# Patient Record
Sex: FEMALE | Race: WHITE | Employment: UNEMPLOYED | URBAN - NONMETROPOLITAN AREA
[De-identification: names, ages, dates, MRNs, and addresses within clinical notes are randomized per-mention and may not be internally consistent; named-entity substitution may affect disease eponyms.]

---

## 2017-08-22 ENCOUNTER — TRANSCRIBE ORDERS (OUTPATIENT)
Dept: LAB | Facility: MEDICAL CENTER | Age: 47
End: 2017-08-22

## 2017-08-22 ENCOUNTER — APPOINTMENT (OUTPATIENT)
Dept: LAB | Facility: MEDICAL CENTER | Age: 47
End: 2017-08-22
Payer: COMMERCIAL

## 2017-08-22 DIAGNOSIS — E16.1 HYPERINSULINEMIA: Primary | ICD-10-CM

## 2017-08-22 DIAGNOSIS — E16.1 HYPERINSULINEMIA: ICD-10-CM

## 2017-08-22 LAB — INSULIN SERPL-ACNC: 4.8 MU/L (ref 3–25)

## 2017-08-22 PROCEDURE — 83525 ASSAY OF INSULIN: CPT

## 2017-08-22 PROCEDURE — 36415 COLL VENOUS BLD VENIPUNCTURE: CPT

## 2018-03-29 ENCOUNTER — TRANSCRIBE ORDERS (OUTPATIENT)
Dept: LAB | Facility: MEDICAL CENTER | Age: 48
End: 2018-03-29

## 2018-03-29 ENCOUNTER — APPOINTMENT (OUTPATIENT)
Dept: LAB | Facility: MEDICAL CENTER | Age: 48
End: 2018-03-29
Payer: COMMERCIAL

## 2018-03-29 DIAGNOSIS — B34.9 VIRAL SYNDROME: Primary | ICD-10-CM

## 2018-03-29 DIAGNOSIS — R53.83 FATIGUE, UNSPECIFIED TYPE: ICD-10-CM

## 2018-03-29 DIAGNOSIS — F11.90 OPIATE USE: ICD-10-CM

## 2018-03-29 DIAGNOSIS — B34.9 VIRAL SYNDROME: ICD-10-CM

## 2018-03-29 DIAGNOSIS — Z79.891 ENCOUNTER FOR LONG-TERM METHADONE USE: ICD-10-CM

## 2018-03-29 LAB
25(OH)D3 SERPL-MCNC: 80.6 NG/ML (ref 30–100)
ALBUMIN SERPL BCP-MCNC: 4.1 G/DL (ref 3.5–5)
ALP SERPL-CCNC: 83 U/L (ref 46–116)
ALT SERPL W P-5'-P-CCNC: 15 U/L (ref 12–78)
ANION GAP SERPL CALCULATED.3IONS-SCNC: 7 MMOL/L (ref 4–13)
AST SERPL W P-5'-P-CCNC: 12 U/L (ref 5–45)
BASOPHILS # BLD AUTO: 0.03 THOUSANDS/ΜL (ref 0–0.1)
BASOPHILS NFR BLD AUTO: 0 % (ref 0–1)
BILIRUB SERPL-MCNC: 0.46 MG/DL (ref 0.2–1)
BUN SERPL-MCNC: 12 MG/DL (ref 5–25)
CALCIUM SERPL-MCNC: 9.4 MG/DL (ref 8.3–10.1)
CHLORIDE SERPL-SCNC: 109 MMOL/L (ref 100–108)
CO2 SERPL-SCNC: 21 MMOL/L (ref 21–32)
CREAT SERPL-MCNC: 1.1 MG/DL (ref 0.6–1.3)
EOSINOPHIL # BLD AUTO: 0.12 THOUSAND/ΜL (ref 0–0.61)
EOSINOPHIL NFR BLD AUTO: 1 % (ref 0–6)
ERYTHROCYTE [DISTWIDTH] IN BLOOD BY AUTOMATED COUNT: 13 % (ref 11.6–15.1)
GFR SERPL CREATININE-BSD FRML MDRD: 60 ML/MIN/1.73SQ M
GLUCOSE P FAST SERPL-MCNC: 71 MG/DL (ref 65–99)
HCT VFR BLD AUTO: 37.6 % (ref 34.8–46.1)
HGB BLD-MCNC: 13.1 G/DL (ref 11.5–15.4)
LYMPHOCYTES # BLD AUTO: 3.68 THOUSANDS/ΜL (ref 0.6–4.47)
LYMPHOCYTES NFR BLD AUTO: 33 % (ref 14–44)
MCH RBC QN AUTO: 30.3 PG (ref 26.8–34.3)
MCHC RBC AUTO-ENTMCNC: 34.8 G/DL (ref 31.4–37.4)
MCV RBC AUTO: 87 FL (ref 82–98)
MONOCYTES # BLD AUTO: 0.57 THOUSAND/ΜL (ref 0.17–1.22)
MONOCYTES NFR BLD AUTO: 5 % (ref 4–12)
NEUTROPHILS # BLD AUTO: 6.67 THOUSANDS/ΜL (ref 1.85–7.62)
NEUTS SEG NFR BLD AUTO: 61 % (ref 43–75)
NRBC BLD AUTO-RTO: 0 /100 WBCS
PLATELET # BLD AUTO: 327 THOUSANDS/UL (ref 149–390)
PMV BLD AUTO: 10.1 FL (ref 8.9–12.7)
POTASSIUM SERPL-SCNC: 3.5 MMOL/L (ref 3.5–5.3)
PROT SERPL-MCNC: 7.9 G/DL (ref 6.4–8.2)
RBC # BLD AUTO: 4.33 MILLION/UL (ref 3.81–5.12)
SODIUM SERPL-SCNC: 137 MMOL/L (ref 136–145)
TSH SERPL DL<=0.05 MIU/L-ACNC: 1.66 UIU/ML (ref 0.36–3.74)
WBC # BLD AUTO: 11.09 THOUSAND/UL (ref 4.31–10.16)

## 2018-03-29 PROCEDURE — 80053 COMPREHEN METABOLIC PANEL: CPT

## 2018-03-29 PROCEDURE — 82306 VITAMIN D 25 HYDROXY: CPT

## 2018-03-29 PROCEDURE — 84443 ASSAY THYROID STIM HORMONE: CPT

## 2018-03-29 PROCEDURE — 36415 COLL VENOUS BLD VENIPUNCTURE: CPT

## 2018-03-29 PROCEDURE — 86644 CMV ANTIBODY: CPT

## 2018-03-29 PROCEDURE — 86738 MYCOPLASMA ANTIBODY: CPT

## 2018-03-29 PROCEDURE — 80307 DRUG TEST PRSMV CHEM ANLYZR: CPT | Performed by: FAMILY MEDICINE

## 2018-03-29 PROCEDURE — 80361 OPIATES 1 OR MORE: CPT | Performed by: FAMILY MEDICINE

## 2018-03-29 PROCEDURE — 86645 CMV ANTIBODY IGM: CPT

## 2018-03-29 PROCEDURE — 86665 EPSTEIN-BARR CAPSID VCA: CPT

## 2018-03-29 PROCEDURE — 86664 EPSTEIN-BARR NUCLEAR ANTIGEN: CPT

## 2018-03-29 PROCEDURE — 86663 EPSTEIN-BARR ANTIBODY: CPT

## 2018-03-29 PROCEDURE — 85025 COMPLETE CBC W/AUTO DIFF WBC: CPT

## 2018-03-30 LAB
CMV IGG SERPL IA-ACNC: <0.6 U/ML (ref 0–0.59)
CMV IGM SERPL IA-ACNC: <30 AU/ML (ref 0–29.9)
EBV EA IGG SER-ACNC: <9 U/ML (ref 0–8.9)
EBV NA IGG SER IA-ACNC: >600 U/ML (ref 0–17.9)
EBV PATRN SPEC IB-IMP: ABNORMAL
EBV VCA IGG SER IA-ACNC: >600 U/ML (ref 0–17.9)
EBV VCA IGM SER IA-ACNC: <36 U/ML (ref 0–35.9)
M PNEUMO IGG SER IA-ACNC: <100 U/ML (ref 0–99)
M PNEUMO IGM SER IA-ACNC: <770 U/ML (ref 0–769)
OXYCODONE+OXYMORPHONE UR QL SCN: NEGATIVE NG/ML

## 2018-04-03 LAB
AMPHETAMINES UR QL SCN: NEGATIVE NG/ML
BARBITURATES UR QL SCN: NEGATIVE NG/ML
BENZODIAZ UR QL SCN: NEGATIVE
BZE UR QL: NEGATIVE NG/ML
CANNABINOIDS UR QL SCN: NEGATIVE NG/ML
METHADONE UR QL SCN: NEGATIVE NG/ML
OPIATES UR QL: NEGATIVE
PCP UR QL: NEGATIVE NG/ML
PROPOXYPH UR QL: NEGATIVE NG/ML

## 2018-04-04 LAB
HYDROCODONE UR QL: 498 NG/ML
HYDROMORPHONE UR QL: 209 NG/ML

## 2019-06-27 ENCOUNTER — APPOINTMENT (OUTPATIENT)
Dept: LAB | Facility: MEDICAL CENTER | Age: 49
End: 2019-06-27
Payer: COMMERCIAL

## 2019-06-27 ENCOUNTER — TRANSCRIBE ORDERS (OUTPATIENT)
Dept: ADMINISTRATIVE | Facility: HOSPITAL | Age: 49
End: 2019-06-27

## 2019-06-27 DIAGNOSIS — E55.9 VITAMIN D DEFICIENCY DISEASE: ICD-10-CM

## 2019-06-27 DIAGNOSIS — E78.5 HYPERLIPIDEMIA, UNSPECIFIED HYPERLIPIDEMIA TYPE: ICD-10-CM

## 2019-06-27 DIAGNOSIS — I10 HYPERTENSION, UNSPECIFIED TYPE: Primary | ICD-10-CM

## 2019-06-27 DIAGNOSIS — I10 HYPERTENSION, UNSPECIFIED TYPE: ICD-10-CM

## 2019-06-27 DIAGNOSIS — Z13.9 SCREENING FOR UNSPECIFIED CONDITION: ICD-10-CM

## 2019-06-27 LAB
25(OH)D3 SERPL-MCNC: 25.6 NG/ML (ref 30–100)
ALBUMIN SERPL BCP-MCNC: 4.4 G/DL (ref 3.5–5)
ALP SERPL-CCNC: 82 U/L (ref 46–116)
ALT SERPL W P-5'-P-CCNC: 21 U/L (ref 12–78)
ANION GAP SERPL CALCULATED.3IONS-SCNC: 11 MMOL/L (ref 4–13)
AST SERPL W P-5'-P-CCNC: 13 U/L (ref 5–45)
BASOPHILS # BLD AUTO: 0.06 THOUSANDS/ΜL (ref 0–0.1)
BASOPHILS NFR BLD AUTO: 1 % (ref 0–1)
BILIRUB SERPL-MCNC: 0.49 MG/DL (ref 0.2–1)
BUN SERPL-MCNC: 21 MG/DL (ref 5–25)
CALCIUM SERPL-MCNC: 9.4 MG/DL (ref 8.3–10.1)
CHLORIDE SERPL-SCNC: 112 MMOL/L (ref 100–108)
CHOLEST SERPL-MCNC: 189 MG/DL (ref 50–200)
CO2 SERPL-SCNC: 16 MMOL/L (ref 21–32)
CREAT SERPL-MCNC: 1.13 MG/DL (ref 0.6–1.3)
EOSINOPHIL # BLD AUTO: 0.12 THOUSAND/ΜL (ref 0–0.61)
EOSINOPHIL NFR BLD AUTO: 1 % (ref 0–6)
ERYTHROCYTE [DISTWIDTH] IN BLOOD BY AUTOMATED COUNT: 12.3 % (ref 11.6–15.1)
GFR SERPL CREATININE-BSD FRML MDRD: 58 ML/MIN/1.73SQ M
GLUCOSE P FAST SERPL-MCNC: 61 MG/DL (ref 65–99)
HCT VFR BLD AUTO: 43.1 % (ref 34.8–46.1)
HDLC SERPL-MCNC: 52 MG/DL (ref 40–60)
HGB BLD-MCNC: 14 G/DL (ref 11.5–15.4)
IMM GRANULOCYTES # BLD AUTO: 0.03 THOUSAND/UL (ref 0–0.2)
IMM GRANULOCYTES NFR BLD AUTO: 0 % (ref 0–2)
LDLC SERPL CALC-MCNC: 122 MG/DL (ref 0–100)
LYMPHOCYTES # BLD AUTO: 2.76 THOUSANDS/ΜL (ref 0.6–4.47)
LYMPHOCYTES NFR BLD AUTO: 22 % (ref 14–44)
MCH RBC QN AUTO: 29.1 PG (ref 26.8–34.3)
MCHC RBC AUTO-ENTMCNC: 32.5 G/DL (ref 31.4–37.4)
MCV RBC AUTO: 90 FL (ref 82–98)
MONOCYTES # BLD AUTO: 0.46 THOUSAND/ΜL (ref 0.17–1.22)
MONOCYTES NFR BLD AUTO: 4 % (ref 4–12)
NEUTROPHILS # BLD AUTO: 9.04 THOUSANDS/ΜL (ref 1.85–7.62)
NEUTS SEG NFR BLD AUTO: 72 % (ref 43–75)
NONHDLC SERPL-MCNC: 137 MG/DL
NRBC BLD AUTO-RTO: 0 /100 WBCS
PLATELET # BLD AUTO: 251 THOUSANDS/UL (ref 149–390)
PMV BLD AUTO: 11.3 FL (ref 8.9–12.7)
POTASSIUM SERPL-SCNC: 3.5 MMOL/L (ref 3.5–5.3)
PROT SERPL-MCNC: 7.8 G/DL (ref 6.4–8.2)
RBC # BLD AUTO: 4.81 MILLION/UL (ref 3.81–5.12)
SODIUM SERPL-SCNC: 139 MMOL/L (ref 136–145)
TRIGL SERPL-MCNC: 76 MG/DL
TSH SERPL DL<=0.05 MIU/L-ACNC: 1.31 UIU/ML (ref 0.36–3.74)
WBC # BLD AUTO: 12.47 THOUSAND/UL (ref 4.31–10.16)

## 2019-06-27 PROCEDURE — 84443 ASSAY THYROID STIM HORMONE: CPT

## 2019-06-27 PROCEDURE — 36415 COLL VENOUS BLD VENIPUNCTURE: CPT

## 2019-06-27 PROCEDURE — 82306 VITAMIN D 25 HYDROXY: CPT

## 2019-06-27 PROCEDURE — 80061 LIPID PANEL: CPT

## 2019-06-27 PROCEDURE — 85025 COMPLETE CBC W/AUTO DIFF WBC: CPT

## 2019-06-27 PROCEDURE — 80053 COMPREHEN METABOLIC PANEL: CPT

## 2020-05-28 RX ORDER — HYDROCODONE BITARTRATE AND ACETAMINOPHEN 7.5; 325 MG/1; MG/1
1 TABLET ORAL EVERY 6 HOURS PRN
COMMUNITY
End: 2020-06-08

## 2020-05-28 RX ORDER — LORATADINE 10 MG/1
10 TABLET ORAL DAILY
COMMUNITY
End: 2020-06-08 | Stop reason: SDUPTHER

## 2020-06-01 ENCOUNTER — TELEPHONE (OUTPATIENT)
Dept: FAMILY MEDICINE CLINIC | Facility: CLINIC | Age: 50
End: 2020-06-01

## 2020-06-01 DIAGNOSIS — J44.9 CHRONIC OBSTRUCTIVE PULMONARY DISEASE, UNSPECIFIED COPD TYPE (HCC): Primary | ICD-10-CM

## 2020-06-01 DIAGNOSIS — Z72.0 TOBACCO ABUSE: ICD-10-CM

## 2020-06-01 RX ORDER — NICOTINE 21 MG/24HR
1 PATCH, TRANSDERMAL 24 HOURS TRANSDERMAL EVERY 24 HOURS
Qty: 28 PATCH | Refills: 2 | Status: SHIPPED | OUTPATIENT
Start: 2020-06-01 | End: 2020-06-08

## 2020-06-01 RX ORDER — BENZONATATE 200 MG/1
200 CAPSULE ORAL 3 TIMES DAILY PRN
Qty: 30 CAPSULE | Refills: 2 | Status: SHIPPED | OUTPATIENT
Start: 2020-06-01 | End: 2020-07-07 | Stop reason: SDUPTHER

## 2020-06-08 ENCOUNTER — OFFICE VISIT (OUTPATIENT)
Dept: FAMILY MEDICINE CLINIC | Facility: CLINIC | Age: 50
End: 2020-06-08
Payer: COMMERCIAL

## 2020-06-08 VITALS
HEART RATE: 68 BPM | HEIGHT: 61 IN | DIASTOLIC BLOOD PRESSURE: 68 MMHG | RESPIRATION RATE: 20 BRPM | SYSTOLIC BLOOD PRESSURE: 124 MMHG | BODY MASS INDEX: 27.38 KG/M2 | WEIGHT: 145 LBS

## 2020-06-08 DIAGNOSIS — M54.50 CHRONIC BILATERAL LOW BACK PAIN WITHOUT SCIATICA: Primary | ICD-10-CM

## 2020-06-08 DIAGNOSIS — J43.1 PANLOBULAR EMPHYSEMA (HCC): ICD-10-CM

## 2020-06-08 DIAGNOSIS — J30.2 SEASONAL ALLERGIES: Primary | ICD-10-CM

## 2020-06-08 DIAGNOSIS — Z72.0 TOBACCO ABUSE DISORDER: ICD-10-CM

## 2020-06-08 DIAGNOSIS — G89.29 CHRONIC BILATERAL LOW BACK PAIN WITHOUT SCIATICA: Primary | ICD-10-CM

## 2020-06-08 DIAGNOSIS — Z88.9 HISTORY OF SEASONAL ALLERGIES: ICD-10-CM

## 2020-06-08 DIAGNOSIS — J45.40 MODERATE PERSISTENT ASTHMA, UNSPECIFIED WHETHER COMPLICATED: ICD-10-CM

## 2020-06-08 DIAGNOSIS — E78.2 MIXED HYPERLIPIDEMIA: ICD-10-CM

## 2020-06-08 PROCEDURE — 3078F DIAST BP <80 MM HG: CPT | Performed by: FAMILY MEDICINE

## 2020-06-08 PROCEDURE — 3008F BODY MASS INDEX DOCD: CPT | Performed by: FAMILY MEDICINE

## 2020-06-08 PROCEDURE — 99406 BEHAV CHNG SMOKING 3-10 MIN: CPT | Performed by: FAMILY MEDICINE

## 2020-06-08 PROCEDURE — 3074F SYST BP LT 130 MM HG: CPT | Performed by: FAMILY MEDICINE

## 2020-06-08 PROCEDURE — 99213 OFFICE O/P EST LOW 20 MIN: CPT | Performed by: FAMILY MEDICINE

## 2020-06-08 RX ORDER — HYDROCODONE BITARTRATE AND ACETAMINOPHEN 7.5; 325 MG/1; MG/1
1 TABLET ORAL 3 TIMES DAILY
Qty: 30 TABLET | Refills: 0 | Status: SHIPPED | OUTPATIENT
Start: 2020-06-08 | End: 2020-07-06 | Stop reason: SDUPTHER

## 2020-06-08 RX ORDER — ATORVASTATIN CALCIUM 10 MG/1
TABLET, FILM COATED ORAL
COMMUNITY
Start: 2015-11-16 | End: 2020-06-08 | Stop reason: SDUPTHER

## 2020-06-08 RX ORDER — TOPIRAMATE 50 MG/1
50 TABLET, FILM COATED ORAL 2 TIMES DAILY
COMMUNITY

## 2020-06-08 RX ORDER — LORATADINE 10 MG/1
10 TABLET ORAL DAILY
COMMUNITY
End: 2020-06-08

## 2020-06-08 RX ORDER — LORATADINE 10 MG/1
10 TABLET ORAL DAILY
Qty: 30 TABLET | Refills: 5 | Status: SHIPPED | OUTPATIENT
Start: 2020-06-08 | End: 2020-12-21 | Stop reason: SDUPTHER

## 2020-06-08 RX ORDER — MIRTAZAPINE 45 MG/1
TABLET, FILM COATED ORAL
COMMUNITY
Start: 2015-11-29 | End: 2020-06-26

## 2020-06-08 RX ORDER — BENZONATATE 200 MG/1
200 CAPSULE ORAL 3 TIMES DAILY PRN
COMMUNITY
End: 2020-06-08

## 2020-06-08 RX ORDER — DICLOFENAC SODIUM 75 MG/1
75 TABLET, DELAYED RELEASE ORAL
COMMUNITY
End: 2020-06-08 | Stop reason: SDUPTHER

## 2020-06-08 RX ORDER — ATORVASTATIN CALCIUM 10 MG/1
10 TABLET, FILM COATED ORAL DAILY
Qty: 30 TABLET | Refills: 5 | Status: SHIPPED | OUTPATIENT
Start: 2020-06-08 | End: 2020-12-21 | Stop reason: SDUPTHER

## 2020-06-08 RX ORDER — QUETIAPINE FUMARATE 100 MG/1
TABLET, FILM COATED ORAL
COMMUNITY
Start: 2015-11-16 | End: 2020-06-26

## 2020-06-08 RX ORDER — MEDROXYPROGESTERONE ACETATE 150 MG/ML
150 INJECTION, SUSPENSION INTRAMUSCULAR
COMMUNITY
Start: 2017-12-20 | End: 2020-12-21

## 2020-06-08 RX ORDER — HYDROCODONE BITARTRATE AND ACETAMINOPHEN 7.5; 325 MG/1; MG/1
TABLET ORAL
COMMUNITY
Start: 2018-06-06 | End: 2020-06-08 | Stop reason: SDUPTHER

## 2020-06-08 RX ORDER — DICLOFENAC SODIUM 75 MG/1
75 TABLET, DELAYED RELEASE ORAL 2 TIMES DAILY
Qty: 60 TABLET | Refills: 5 | Status: SHIPPED | OUTPATIENT
Start: 2020-06-08 | End: 2021-06-10 | Stop reason: SDUPTHER

## 2020-06-08 RX ORDER — HYDROXYZINE PAMOATE 50 MG/1
50 CAPSULE ORAL 3 TIMES DAILY PRN
COMMUNITY
End: 2021-03-16 | Stop reason: SDUPTHER

## 2020-06-08 RX ORDER — METHOCARBAMOL 750 MG/1
750 TABLET, FILM COATED ORAL DAILY PRN
COMMUNITY
End: 2020-06-26

## 2020-06-25 DIAGNOSIS — F33.41 RECURRENT MAJOR DEPRESSIVE DISORDER, IN PARTIAL REMISSION (HCC): ICD-10-CM

## 2020-06-25 DIAGNOSIS — G89.29 CHRONIC BILATERAL LOW BACK PAIN WITHOUT SCIATICA: Primary | ICD-10-CM

## 2020-06-25 DIAGNOSIS — M54.50 CHRONIC BILATERAL LOW BACK PAIN WITHOUT SCIATICA: Primary | ICD-10-CM

## 2020-06-26 RX ORDER — MIRTAZAPINE 45 MG/1
TABLET, FILM COATED ORAL
Qty: 30 TABLET | Refills: 3 | Status: SHIPPED | OUTPATIENT
Start: 2020-06-26 | End: 2020-09-15 | Stop reason: SDUPTHER

## 2020-06-26 RX ORDER — METHOCARBAMOL 750 MG/1
TABLET, FILM COATED ORAL
Qty: 120 TABLET | Refills: 3 | Status: SHIPPED | OUTPATIENT
Start: 2020-06-26 | End: 2021-07-12 | Stop reason: SDUPTHER

## 2020-06-26 RX ORDER — QUETIAPINE FUMARATE 100 MG/1
TABLET, FILM COATED ORAL
Qty: 30 TABLET | Refills: 3 | Status: SHIPPED | OUTPATIENT
Start: 2020-06-26 | End: 2020-09-15 | Stop reason: SDUPTHER

## 2020-07-07 ENCOUNTER — OFFICE VISIT (OUTPATIENT)
Dept: FAMILY MEDICINE CLINIC | Facility: CLINIC | Age: 50
End: 2020-07-07
Payer: COMMERCIAL

## 2020-07-07 VITALS
SYSTOLIC BLOOD PRESSURE: 134 MMHG | WEIGHT: 142 LBS | HEART RATE: 64 BPM | TEMPERATURE: 98.3 F | BODY MASS INDEX: 26.83 KG/M2 | RESPIRATION RATE: 20 BRPM | DIASTOLIC BLOOD PRESSURE: 66 MMHG

## 2020-07-07 DIAGNOSIS — J30.2 SEASONAL ALLERGIES: ICD-10-CM

## 2020-07-07 DIAGNOSIS — Z79.891 OPIOID USE AGREEMENT EXISTS: Primary | ICD-10-CM

## 2020-07-07 DIAGNOSIS — J44.9 CHRONIC OBSTRUCTIVE PULMONARY DISEASE, UNSPECIFIED COPD TYPE (HCC): ICD-10-CM

## 2020-07-07 DIAGNOSIS — M54.50 CHRONIC BILATERAL LOW BACK PAIN WITHOUT SCIATICA: ICD-10-CM

## 2020-07-07 DIAGNOSIS — G89.29 CHRONIC BILATERAL LOW BACK PAIN WITHOUT SCIATICA: ICD-10-CM

## 2020-07-07 PROCEDURE — 3075F SYST BP GE 130 - 139MM HG: CPT | Performed by: FAMILY MEDICINE

## 2020-07-07 PROCEDURE — 99406 BEHAV CHNG SMOKING 3-10 MIN: CPT | Performed by: FAMILY MEDICINE

## 2020-07-07 PROCEDURE — 99213 OFFICE O/P EST LOW 20 MIN: CPT | Performed by: FAMILY MEDICINE

## 2020-07-07 PROCEDURE — 3078F DIAST BP <80 MM HG: CPT | Performed by: FAMILY MEDICINE

## 2020-07-07 RX ORDER — HYDROCODONE BITARTRATE AND ACETAMINOPHEN 7.5; 325 MG/1; MG/1
1 TABLET ORAL 3 TIMES DAILY
Qty: 30 TABLET | Refills: 0 | Status: SHIPPED | OUTPATIENT
Start: 2020-07-07 | End: 2020-08-18 | Stop reason: SDUPTHER

## 2020-07-07 RX ORDER — BENZONATATE 200 MG/1
200 CAPSULE ORAL 3 TIMES DAILY PRN
Qty: 30 CAPSULE | Refills: 2 | Status: SHIPPED | OUTPATIENT
Start: 2020-07-07 | End: 2020-09-15 | Stop reason: SDUPTHER

## 2020-07-07 NOTE — PROGRESS NOTES
Assessment/Plan:  Patient has chronic low back syndrome she is continuing with hydrocodone for pain relief there has been no sign of diversion or misuse PDMP has been reviewed  The patient also has chronic obstructive pulmonary disease  And tobacco use disorder tobacco was discussed at length the patient refuses pharmacological intervention at this time greater than 3 minutes was spent discussing nicotine use and tobacco    Problem List Items Addressed This Visit     None      Visit Diagnoses     Opioid use agreement exists    -  Primary    Relevant Orders    Toxicology screen, urine    Chronic bilateral low back pain without sciatica        Relevant Medications    HYDROcodone-acetaminophen (NORCO) 7 5-325 mg per tablet    Chronic obstructive pulmonary disease, unspecified COPD type ([address] )        Relevant Medications    benzonatate (TESSALON) 200 MG capsule           Diagnoses and all orders for this visit:    Opioid use agreement exists  -     Toxicology screen, urine    Chronic bilateral low back pain without sciatica  -     HYDROcodone-acetaminophen (NORCO) 7 5-325 mg per tablet; Take 1 tablet by mouth 3 (three) times a dayMax Daily Amount: 3 tablets    Chronic obstructive pulmonary disease, unspecified COPD type ([facility])  -     benzonatate (TESSALON) 200 MG capsule; Take 1 capsule (200 mg total) by mouth 3 (three) times a day as needed for cough        No problem-specific Assessment & Plan notes found for this encounter  PHQ-9 Depression Screening    PHQ-9:    Frequency of the following problems over the past two weeks: Body mass index is 26 83 kg/m²  BMI Counseling: Body mass index is 26 83 kg/m²   The BMI is above normal  Nutrition recommendations include reducing portion sizes, decreasing overall calorie intake, 3-5 servings of fruits/vegetables daily, reducing fast food intake, consuming healthier snacks, decreasing soda and/or juice intake, moderation in carbohydrate intake, increasing intake of lean protein, reducing intake of saturated fat and trans fat and reducing intake of cholesterol  Subjective:      Patient ID: Jose Lopez is a 53 y o  female  Patient presents for medication refill      The following portions of the patient's history were reviewed and updated as appropriate:   She has a past medical history of Anxiety, Asthma, Chronic low back pain, COPD (chronic obstructive pulmonary disease) ([address] ), DDD (degenerative disc disease), cervical, and Hypertension  ,  does not have a problem list on file  ,   has a past surgical history that includes Hernia repair and Chest tube insertion  ,  family history includes Anxiety disorder in her other; Arthritis in her other; Cancer in her other; Depression in her other; Diabetes in her other; Heart disease in her other  ,   reports that she has been smoking  She has never used smokeless tobacco  She reports that she drinks alcohol  She reports that she has current or past drug history  ,  has No Known Allergies     Current Outpatient Medications   Medication Sig Dispense Refill    atorvastatin (LIPITOR) 10 mg tablet Take 1 tablet (10 mg total) by mouth daily 30 tablet 5    benzonatate (TESSALON) 200 MG capsule Take 1 capsule (200 mg total) by mouth 3 (three) times a day as needed for cough 30 capsule 2    diclofenac (VOLTAREN) 75 mg EC tablet Take 1 tablet (75 mg total) by mouth 2 (two) times a day 60 tablet 5    fluticasone-umeclidinium-vilanterol (Trelegy Ellipta) 100-62 5-25 MCG/INH inhaler Inhale 1 puff daily Rinse mouth after use   2 Inhaler 0    HYDROcodone-acetaminophen (NORCO) 7 5-325 mg per tablet Take 1 tablet by mouth 3 (three) times a dayMax Daily Amount: 3 tablets 30 tablet 0    hydrOXYzine pamoate (VISTARIL) 50 mg capsule Take 50 mg by mouth Three times daily as needed      ipratropium (Atrovent HFA) 17 mcg/act inhaler Inhale      loratadine (CLARITIN) 10 mg tablet Take 1 tablet (10 mg total) by mouth daily 30 tablet 5    medroxyPROGESTERone (DEPO-PROVERA) 150 mg/mL injection Inject 150 mg into a muscle      methocarbamol (ROBAXIN) 750 mg tablet take 1 tablet by mouth four times a day 120 tablet 3    mirtazapine (REMERON) 45 MG tablet take 1 tablet by mouth at bedtime 30 tablet 3    Multiple Vitamins-Minerals (MULTIVITAMIN ADULT PO) Take by mouth      QUEtiapine (SEROquel) 100 mg tablet take 1 tablet by mouth at bedtime 30 tablet 3    topiramate (TOPAMAX) 50 MG tablet Take 50 mg by mouth 2 (two) times a day      TURMERIC PO Take 1 tablet by mouth       No current facility-administered medications for this visit  Review of Systems   Constitutional: Negative  HENT: Negative  Eyes: Negative  Respiratory: Positive for shortness of breath and wheezing  Cardiovascular: Negative  Gastrointestinal: Negative  Endocrine: Negative  Genitourinary: Negative  Musculoskeletal: Positive for arthralgias, back pain and neck stiffness  Skin: Negative  Allergic/Immunologic: Negative  Neurological: Negative  Hematological: Negative  Psychiatric/Behavioral: Negative  Objective:    /66   Pulse 64   Temp 98 3 °F (36 8 °C)   Resp 20   Wt 64 4 kg (142 lb)   BMI 26 83 kg/m²   Body mass index is 26 83 kg/m²  Physical Exam   Constitutional: She is oriented to person, place, and time  She appears well-developed and well-nourished  HENT:   Head: Normocephalic  Eyes: Pupils are equal, round, and reactive to light  Neck: Normal range of motion  Cardiovascular: Normal rate, regular rhythm and normal heart sounds  Pulmonary/Chest: Effort normal and breath sounds normal    Abdominal: Soft  Bowel sounds are normal  There is no tenderness     Musculoskeletal:   Has decreased range of motion of the lumbar spine with paraspinal spasm and sacroiliac tenderness bilaterally also has pain of the left hip in the area of the greater trochanter she has negative leg raising   Neurological: She is alert and oriented to person, place, and time  Skin: Skin is warm  Psychiatric: She has a normal mood and affect

## 2020-07-23 ENCOUNTER — APPOINTMENT (OUTPATIENT)
Dept: LAB | Facility: MEDICAL CENTER | Age: 50
End: 2020-07-23
Payer: COMMERCIAL

## 2020-07-23 PROCEDURE — 80307 DRUG TEST PRSMV CHEM ANLYZR: CPT | Performed by: FAMILY MEDICINE

## 2020-07-29 LAB
AMPHETAMINES UR QL SCN: NEGATIVE NG/ML
BARBITURATES UR QL SCN: NEGATIVE NG/ML
BENZODIAZ UR QL: NEGATIVE NG/ML
BZE UR QL: NEGATIVE NG/ML
CANNABINOIDS UR QL SCN: NEGATIVE NG/ML
METHADONE UR QL SCN: NEGATIVE NG/ML
OPIATES UR QL: NEGATIVE
PCP UR QL: NEGATIVE NG/ML
PROPOXYPH UR QL SCN: NEGATIVE NG/ML

## 2020-08-18 ENCOUNTER — OFFICE VISIT (OUTPATIENT)
Dept: FAMILY MEDICINE CLINIC | Facility: CLINIC | Age: 50
End: 2020-08-18
Payer: COMMERCIAL

## 2020-08-18 VITALS
SYSTOLIC BLOOD PRESSURE: 126 MMHG | RESPIRATION RATE: 20 BRPM | DIASTOLIC BLOOD PRESSURE: 74 MMHG | WEIGHT: 140 LBS | HEART RATE: 68 BPM | BODY MASS INDEX: 26.45 KG/M2

## 2020-08-18 DIAGNOSIS — F17.200 TOBACCO USE DISORDER: Primary | ICD-10-CM

## 2020-08-18 DIAGNOSIS — J45.40 MODERATE PERSISTENT ASTHMA, UNSPECIFIED WHETHER COMPLICATED: ICD-10-CM

## 2020-08-18 DIAGNOSIS — M54.50 CHRONIC BILATERAL LOW BACK PAIN WITHOUT SCIATICA: ICD-10-CM

## 2020-08-18 DIAGNOSIS — J43.1 PANLOBULAR EMPHYSEMA (HCC): ICD-10-CM

## 2020-08-18 DIAGNOSIS — G89.29 CHRONIC BILATERAL LOW BACK PAIN WITHOUT SCIATICA: ICD-10-CM

## 2020-08-18 DIAGNOSIS — J44.9 CHRONIC OBSTRUCTIVE PULMONARY DISEASE, UNSPECIFIED COPD TYPE (HCC): ICD-10-CM

## 2020-08-18 DIAGNOSIS — J30.2 SEASONAL ALLERGIES: ICD-10-CM

## 2020-08-18 PROCEDURE — 99214 OFFICE O/P EST MOD 30 MIN: CPT | Performed by: FAMILY MEDICINE

## 2020-08-18 PROCEDURE — 3078F DIAST BP <80 MM HG: CPT | Performed by: FAMILY MEDICINE

## 2020-08-18 PROCEDURE — 3074F SYST BP LT 130 MM HG: CPT | Performed by: FAMILY MEDICINE

## 2020-08-18 PROCEDURE — 4004F PT TOBACCO SCREEN RCVD TLK: CPT | Performed by: FAMILY MEDICINE

## 2020-08-18 RX ORDER — HYDROCODONE BITARTRATE AND ACETAMINOPHEN 7.5; 325 MG/1; MG/1
1 TABLET ORAL 3 TIMES DAILY
Qty: 30 TABLET | Refills: 0 | Status: SHIPPED | OUTPATIENT
Start: 2020-08-18 | End: 2020-08-18 | Stop reason: SDUPTHER

## 2020-08-18 RX ORDER — FLUTICASONE FUROATE, UMECLIDINIUM BROMIDE AND VILANTEROL TRIFENATATE 100; 62.5; 25 UG/1; UG/1; UG/1
1 POWDER RESPIRATORY (INHALATION) DAILY
Qty: 2 INHALER | Refills: 0 | Status: SHIPPED | COMMUNITY
Start: 2020-08-18 | End: 2020-09-15 | Stop reason: SDUPTHER

## 2020-08-18 RX ORDER — HYDROCODONE BITARTRATE AND ACETAMINOPHEN 7.5; 325 MG/1; MG/1
1 TABLET ORAL 3 TIMES DAILY
Qty: 30 TABLET | Refills: 0 | Status: SHIPPED | OUTPATIENT
Start: 2020-08-18 | End: 2020-09-15 | Stop reason: SDUPTHER

## 2020-08-18 NOTE — PROGRESS NOTES
Assessment/Plan:  Chronic bilateral low back pain without sciatica  PDMP review no concerns takes hydrocodone 7 5 mg t i d  P r n  No evidence of abuse or divergence  Chronic obstructive pulmonary disease with moderate persistent asthma  Trilogy samples given  Tobacco use disorder patient states that she is using the patch and cutting back on her nicotine usage spent approximately 3 minutes discovered tobacco use disorder and she is encouraged to continue using the nicotine patch  Seasonal allergies fairly well controlled with Flonase will renew Flonase  Mixed hyperlipidemia the patient is due for lab work encouraged the patient to obtain lab before the next visit  Problem List Items Addressed This Visit        Other    Chronic bilateral low back pain without sciatica      Other Visit Diagnoses     Tobacco use disorder    -  Primary    Seasonal allergies        Chronic obstructive pulmonary disease, unspecified COPD type ([address] )        Panlobular emphysema (HCC)        Moderate persistent asthma, unspecified whether complicated               Diagnoses and all orders for this visit:    Tobacco use disorder    Chronic bilateral low back pain without sciatica  -     HYDROcodone-acetaminophen (NORCO) 7 5-325 mg per tablet; Take 1 tablet by mouth 3 (three) times a dayMax Daily Amount: 3 tablets    Seasonal allergies  -     fluticasone-umeclidinium-vilanterol (Trelegy Ellipta) 100-62 5-25 MCG/INH inhaler; Inhale 1 puff daily Rinse mouth after use  Chronic obstructive pulmonary disease, unspecified COPD type ([address] )    Panlobular emphysema (HCC)    Moderate persistent asthma, unspecified whether complicated        No problem-specific Assessment & Plan notes found for this encounter  PHQ-9 Depression Screening    PHQ-9:    Frequency of the following problems over the past two weeks: Body mass index is 26 45 kg/m²  BMI Counseling: Body mass index is 26 45 kg/m²   The BMI Subjective:      Patient ID: Nicolette Self is a 53 y o  female  Patient presents for checkup for chronic low back syndrome and chronic obstructive pulmonary disease      The following portions of the patient's history were reviewed and updated as appropriate:   She has a past medical history of Anxiety, Asthma, Chronic low back pain, COPD (chronic obstructive pulmonary disease) ([address] ), DDD (degenerative disc disease), cervical, and Hypertension  ,  does not have any pertinent problems on file  ,   has a past surgical history that includes Hernia repair and Chest tube insertion  ,  family history includes Anxiety disorder in her other; Arthritis in her other; Cancer in her other; Depression in her other; Diabetes in her other; Heart disease in her other  ,   reports that she has been smoking  She has never used smokeless tobacco  She reports current alcohol use  She reports previous drug use ,  has No Known Allergies     Current Outpatient Medications   Medication Sig Dispense Refill    atorvastatin (LIPITOR) 10 mg tablet Take 1 tablet (10 mg total) by mouth daily 30 tablet 5    benzonatate (TESSALON) 200 MG capsule Take 1 capsule (200 mg total) by mouth 3 (three) times a day as needed for cough 30 capsule 2    diclofenac (VOLTAREN) 75 mg EC tablet Take 1 tablet (75 mg total) by mouth 2 (two) times a day 60 tablet 5    fluticasone-umeclidinium-vilanterol (Trelegy Ellipta) 100-62 5-25 MCG/INH inhaler Inhale 1 puff daily Rinse mouth after use   1 Inhaler 0    HYDROcodone-acetaminophen (NORCO) 7 5-325 mg per tablet Take 1 tablet by mouth 3 (three) times a dayMax Daily Amount: 3 tablets 30 tablet 0    hydrOXYzine pamoate (VISTARIL) 50 mg capsule Take 50 mg by mouth Three times daily as needed      loratadine (CLARITIN) 10 mg tablet Take 1 tablet (10 mg total) by mouth daily 30 tablet 5    medroxyPROGESTERone (DEPO-PROVERA) 150 mg/mL injection Inject 150 mg into a muscle      methocarbamol (ROBAXIN) 750 mg tablet take 1 tablet by mouth four times a day 120 tablet 3    mirtazapine (REMERON) 45 MG tablet take 1 tablet by mouth at bedtime 30 tablet 3    Multiple Vitamins-Minerals (MULTIVITAMIN ADULT PO) Take by mouth      QUEtiapine (SEROquel) 100 mg tablet take 1 tablet by mouth at bedtime 30 tablet 3    topiramate (TOPAMAX) 50 MG tablet Take 50 mg by mouth 2 (two) times a day      TURMERIC PO Take 1 tablet by mouth       No current facility-administered medications for this visit  Review of Systems   Constitutional: Negative  HENT: Negative  Eyes: Negative  Respiratory: Positive for cough, shortness of breath and wheezing  Cardiovascular: Negative  Gastrointestinal: Negative  Endocrine: Negative  Genitourinary: Negative  Musculoskeletal: Positive for arthralgias, back pain, myalgias and neck pain  Skin: Negative  Allergic/Immunologic: Negative  Neurological: Negative  Hematological: Negative  Psychiatric/Behavioral: Positive for dysphoric mood  The patient is nervous/anxious  Objective:    /74   Pulse 68   Resp 20   Wt 63 5 kg (140 lb)   BMI 26 45 kg/m²   Body mass index is 26 45 kg/m²  Physical Exam  Constitutional:       Appearance: She is well-developed  HENT:      Head: Normocephalic  Eyes:      Pupils: Pupils are equal, round, and reactive to light  Neck:      Musculoskeletal: Normal range of motion  Cardiovascular:      Rate and Rhythm: Normal rate and regular rhythm  Heart sounds: Normal heart sounds  Pulmonary:      Effort: Pulmonary effort is normal       Breath sounds: Rhonchi present  Abdominal:      General: Bowel sounds are normal       Palpations: Abdomen is soft  Tenderness: There is no abdominal tenderness  Musculoskeletal:      Comments: Lumbar paraspinal spasm with decreased range of motion bilateral SI tenderness  Skin:     General: Skin is warm     Neurological:      Mental Status: She is alert and oriented to person, place, and time

## 2020-09-15 ENCOUNTER — OFFICE VISIT (OUTPATIENT)
Dept: FAMILY MEDICINE CLINIC | Facility: CLINIC | Age: 50
End: 2020-09-15
Payer: COMMERCIAL

## 2020-09-15 VITALS
WEIGHT: 139 LBS | RESPIRATION RATE: 20 BRPM | BODY MASS INDEX: 26.26 KG/M2 | HEART RATE: 68 BPM | DIASTOLIC BLOOD PRESSURE: 84 MMHG | SYSTOLIC BLOOD PRESSURE: 130 MMHG

## 2020-09-15 DIAGNOSIS — J45.40 MODERATE PERSISTENT ASTHMA, UNSPECIFIED WHETHER COMPLICATED: ICD-10-CM

## 2020-09-15 DIAGNOSIS — J44.9 CHRONIC OBSTRUCTIVE PULMONARY DISEASE, UNSPECIFIED COPD TYPE (HCC): ICD-10-CM

## 2020-09-15 DIAGNOSIS — G89.29 CHRONIC BILATERAL LOW BACK PAIN WITHOUT SCIATICA: ICD-10-CM

## 2020-09-15 DIAGNOSIS — F33.41 RECURRENT MAJOR DEPRESSIVE DISORDER, IN PARTIAL REMISSION (HCC): ICD-10-CM

## 2020-09-15 DIAGNOSIS — J30.2 SEASONAL ALLERGIES: ICD-10-CM

## 2020-09-15 DIAGNOSIS — M54.50 CHRONIC BILATERAL LOW BACK PAIN WITHOUT SCIATICA: ICD-10-CM

## 2020-09-15 DIAGNOSIS — J43.1 PANLOBULAR EMPHYSEMA (HCC): ICD-10-CM

## 2020-09-15 DIAGNOSIS — J40 BRONCHITIS: ICD-10-CM

## 2020-09-15 DIAGNOSIS — Z72.0 TOBACCO ABUSE DISORDER: Primary | ICD-10-CM

## 2020-09-15 PROCEDURE — 99214 OFFICE O/P EST MOD 30 MIN: CPT | Performed by: FAMILY MEDICINE

## 2020-09-15 RX ORDER — QUETIAPINE FUMARATE 100 MG/1
100 TABLET, FILM COATED ORAL
Qty: 90 TABLET | Refills: 1 | Status: SHIPPED | OUTPATIENT
Start: 2020-09-15 | End: 2020-10-22 | Stop reason: SDUPTHER

## 2020-09-15 RX ORDER — HYDROCODONE BITARTRATE AND ACETAMINOPHEN 7.5; 325 MG/1; MG/1
1 TABLET ORAL 3 TIMES DAILY
Qty: 30 TABLET | Refills: 0 | Status: SHIPPED | OUTPATIENT
Start: 2020-09-15 | End: 2020-09-18 | Stop reason: SDUPTHER

## 2020-09-15 RX ORDER — MIRTAZAPINE 45 MG/1
45 TABLET, FILM COATED ORAL
Qty: 30 TABLET | Refills: 2 | Status: SHIPPED | OUTPATIENT
Start: 2020-09-15 | End: 2020-12-21 | Stop reason: SDUPTHER

## 2020-09-15 RX ORDER — BENZONATATE 200 MG/1
200 CAPSULE ORAL 3 TIMES DAILY PRN
Qty: 30 CAPSULE | Refills: 2 | Status: SHIPPED | OUTPATIENT
Start: 2020-09-15 | End: 2020-12-21 | Stop reason: SDUPTHER

## 2020-09-15 RX ORDER — PHENOL 1.4 %
600 AEROSOL, SPRAY (ML) MUCOUS MEMBRANE 2 TIMES DAILY WITH MEALS
Qty: 180 TABLET | Refills: 1 | Status: SHIPPED | OUTPATIENT
Start: 2020-09-15 | End: 2021-02-19

## 2020-09-15 RX ORDER — AZITHROMYCIN 250 MG/1
TABLET, FILM COATED ORAL
Qty: 6 TABLET | Refills: 0 | Status: SHIPPED | OUTPATIENT
Start: 2020-09-15 | End: 2020-09-20

## 2020-09-15 RX ORDER — METHYLPREDNISOLONE 4 MG/1
TABLET ORAL
Qty: 21 EACH | Refills: 0 | Status: SHIPPED | OUTPATIENT
Start: 2020-09-15 | End: 2020-12-21

## 2020-09-15 NOTE — PROGRESS NOTES
Assessment/Plan:  Chronic neck and low back pain PDMP reviewed no concerns no evidence of abuse or divergence medication improves function    Chronic obstructive pulmonary disease with asthmatic overlap therapy effective  Recurrent major depressive disorder response to therapy    Severe lung disease with continued tobacco use disorder several methods were discussed with the patient to help her quit smoking however she has not interested at this time spent approximately 3 minutes on tobacco use disorder    Problem List Items Addressed This Visit        Other    Chronic bilateral low back pain without sciatica      Other Visit Diagnoses     Recurrent major depressive disorder, in partial remission ([facility] )        Chronic obstructive pulmonary disease, unspecified COPD type ([facility] )               Diagnoses and all orders for this visit:    Chronic bilateral low back pain without sciatica  -     HYDROcodone-acetaminophen (NORCO) 7 5-325 mg per tablet; Take 1 tablet by mouth 3 (three) times a dayMax Daily Amount: 3 tablets    Recurrent major depressive disorder, in partial remission ([facility])  -     mirtazapine (REMERON) 45 MG tablet; Take 1 tablet (45 mg total) by mouth daily at bedtime  -     QUEtiapine (SEROquel) 100 mg tablet; Take 1 tablet (100 mg total) by mouth daily at bedtime    Chronic obstructive pulmonary disease, unspecified COPD type ([facility] )  -     benzonatate (TESSALON) 200 MG capsule; Take 1 capsule (200 mg total) by mouth 3 (three) times a day as needed for cough    Other orders  -     calcium carbonate (OS-ONELIA) 600 MG tablet; Take 1 tablet (600 mg total) by mouth 2 (two) times a day with meals        No problem-specific Assessment & Plan notes found for this encounter  PHQ-9 Depression Screening    PHQ-9:    Frequency of the following problems over the past two weeks: Body mass index is 26 26 kg/m²  BMI Counseling: The BMI     Subjective:      Patient ID: John Sanchez is a 53 y o  female  HPI    The following portions of the patient's history were reviewed and updated as appropriate:   She has a past medical history of Anxiety, Asthma, Chronic low back pain, COPD (chronic obstructive pulmonary disease) ([address] ), DDD (degenerative disc disease), cervical, and Hypertension  ,  does not have any pertinent problems on file  ,   has a past surgical history that includes Hernia repair and Chest tube insertion  ,  family history includes Anxiety disorder in her other; Arthritis in her other; Cancer in her other; Depression in her other; Diabetes in her other; Heart disease in her other  ,   reports that she has been smoking  She has never used smokeless tobacco  She reports current alcohol use  She reports previous drug use ,  has No Known Allergies     Current Outpatient Medications   Medication Sig Dispense Refill    atorvastatin (LIPITOR) 10 mg tablet Take 1 tablet (10 mg total) by mouth daily 30 tablet 5    benzonatate (TESSALON) 200 MG capsule Take 1 capsule (200 mg total) by mouth 3 (three) times a day as needed for cough 30 capsule 2    diclofenac (VOLTAREN) 75 mg EC tablet Take 1 tablet (75 mg total) by mouth 2 (two) times a day 60 tablet 5    fluticasone-umeclidinium-vilanterol (Trelegy Ellipta) 100-62 5-25 MCG/INH inhaler Inhale 1 puff daily Rinse mouth after use   2 Inhaler 0    HYDROcodone-acetaminophen (NORCO) 7 5-325 mg per tablet Take 1 tablet by mouth 3 (three) times a dayMax Daily Amount: 3 tablets 30 tablet 0    hydrOXYzine pamoate (VISTARIL) 50 mg capsule Take 50 mg by mouth Three times daily as needed      loratadine (CLARITIN) 10 mg tablet Take 1 tablet (10 mg total) by mouth daily 30 tablet 5    medroxyPROGESTERone (DEPO-PROVERA) 150 mg/mL injection Inject 150 mg into a muscle      methocarbamol (ROBAXIN) 750 mg tablet take 1 tablet by mouth four times a day 120 tablet 3    mirtazapine (REMERON) 45 MG tablet take 1 tablet by mouth at bedtime 30 tablet 3    Multiple Vitamins-Minerals (MULTIVITAMIN ADULT PO) Take by mouth      QUEtiapine (SEROquel) 100 mg tablet take 1 tablet by mouth at bedtime 30 tablet 3    topiramate (TOPAMAX) 50 MG tablet Take 50 mg by mouth 2 (two) times a day      TURMERIC PO Take 1 tablet by mouth       No current facility-administered medications for this visit  Review of Systems   Constitutional: Positive for fatigue  HENT: Negative  Eyes: Negative  Respiratory: Positive for cough, shortness of breath and wheezing  Cardiovascular: Negative  Gastrointestinal: Negative  Endocrine: Negative  Genitourinary: Negative  Musculoskeletal: Positive for arthralgias, back pain, myalgias and neck pain  Chronic neck and low back pain rated 6/10   Skin: Negative  Allergic/Immunologic: Negative  Neurological: Negative  Hematological: Negative  Psychiatric/Behavioral: Positive for dysphoric mood  The patient is nervous/anxious  Objective:    /84   Pulse 68   Resp 20   Wt 63 kg (139 lb)   BMI 26 26 kg/m²   Body mass index is 26 26 kg/m²  Physical Exam  Constitutional:       Appearance: She is well-developed  HENT:      Head: Normocephalic  Eyes:      Pupils: Pupils are equal, round, and reactive to light  Neck:      Musculoskeletal: Normal range of motion  Cardiovascular:      Rate and Rhythm: Normal rate and regular rhythm  Heart sounds: Normal heart sounds  Pulmonary:      Effort: Pulmonary effort is normal       Breath sounds: Rhonchi present  Abdominal:      General: Bowel sounds are normal       Palpations: Abdomen is soft  Tenderness: There is no abdominal tenderness  Musculoskeletal:      Comments: Decreased range of motion of the cervical spine with paraspinal spasm   Skin:     General: Skin is warm  Neurological:      Mental Status: She is alert and oriented to person, place, and time

## 2020-09-18 DIAGNOSIS — G89.4 CHRONIC PAIN SYNDROME: Primary | ICD-10-CM

## 2020-09-18 RX ORDER — HYDROCODONE BITARTRATE AND ACETAMINOPHEN 7.5; 325 MG/1; MG/1
1 TABLET ORAL 3 TIMES DAILY
Qty: 30 TABLET | Refills: 0 | Status: SHIPPED | OUTPATIENT
Start: 2020-09-18 | End: 2020-10-22 | Stop reason: SDUPTHER

## 2020-09-18 NOTE — TELEPHONE ENCOUNTER
PATIENT CALLED BACK - NEEDS TO  NEW RX FOR HER [facility] IS CHEAP[facility] THAN [facility] [address]

## 2020-09-18 NOTE — TELEPHONE ENCOUNTER
TRIED TO CONTACT PATIENT THAT HER PAIN MEDICATION WAS NOT AUTHORIZED BY HER INSURANCE - NO VM SET UP - TRY AGAIN LATER

## 2020-09-22 DIAGNOSIS — G62.9 NEUROPATHY: Primary | ICD-10-CM

## 2020-09-23 RX ORDER — PYRIDOXINE HCL (VITAMIN B6) 50 MG
TABLET ORAL
Qty: 90 TABLET | Refills: 5 | Status: SHIPPED | OUTPATIENT
Start: 2020-09-23 | End: 2021-12-06

## 2020-09-28 RX ORDER — FLUTICASONE FUROATE, UMECLIDINIUM BROMIDE AND VILANTEROL TRIFENATATE 100; 62.5; 25 UG/1; UG/1; UG/1
1 POWDER RESPIRATORY (INHALATION) DAILY
Qty: 2 INHALER | Refills: 0 | Status: SHIPPED | COMMUNITY
Start: 2020-09-28 | End: 2020-10-22 | Stop reason: SDUPTHER

## 2020-10-22 ENCOUNTER — OFFICE VISIT (OUTPATIENT)
Dept: FAMILY MEDICINE CLINIC | Facility: CLINIC | Age: 50
End: 2020-10-22
Payer: COMMERCIAL

## 2020-10-22 VITALS
RESPIRATION RATE: 16 BRPM | HEIGHT: 62 IN | TEMPERATURE: 98.1 F | DIASTOLIC BLOOD PRESSURE: 74 MMHG | BODY MASS INDEX: 25.4 KG/M2 | HEART RATE: 68 BPM | WEIGHT: 138 LBS | SYSTOLIC BLOOD PRESSURE: 132 MMHG

## 2020-10-22 DIAGNOSIS — J30.2 SEASONAL ALLERGIES: ICD-10-CM

## 2020-10-22 DIAGNOSIS — G89.4 CHRONIC PAIN SYNDROME: ICD-10-CM

## 2020-10-22 DIAGNOSIS — J40 BRONCHITIS: Primary | ICD-10-CM

## 2020-10-22 DIAGNOSIS — J45.40 MODERATE PERSISTENT ASTHMA, UNSPECIFIED WHETHER COMPLICATED: ICD-10-CM

## 2020-10-22 DIAGNOSIS — J43.1 PANLOBULAR EMPHYSEMA (HCC): ICD-10-CM

## 2020-10-22 DIAGNOSIS — F17.200 TOBACCO USE DISORDER: ICD-10-CM

## 2020-10-22 DIAGNOSIS — J44.9 CHRONIC OBSTRUCTIVE PULMONARY DISEASE, UNSPECIFIED COPD TYPE (HCC): ICD-10-CM

## 2020-10-22 DIAGNOSIS — F33.41 RECURRENT MAJOR DEPRESSIVE DISORDER, IN PARTIAL REMISSION (HCC): ICD-10-CM

## 2020-10-22 PROCEDURE — 4004F PT TOBACCO SCREEN RCVD TLK: CPT | Performed by: FAMILY MEDICINE

## 2020-10-22 PROCEDURE — 3075F SYST BP GE 130 - 139MM HG: CPT | Performed by: FAMILY MEDICINE

## 2020-10-22 PROCEDURE — 3078F DIAST BP <80 MM HG: CPT | Performed by: FAMILY MEDICINE

## 2020-10-22 PROCEDURE — 99213 OFFICE O/P EST LOW 20 MIN: CPT | Performed by: FAMILY MEDICINE

## 2020-10-22 RX ORDER — QUETIAPINE FUMARATE 100 MG/1
100 TABLET, FILM COATED ORAL
Qty: 90 TABLET | Refills: 1 | Status: SHIPPED | OUTPATIENT
Start: 2020-10-22 | End: 2020-12-21 | Stop reason: SDUPTHER

## 2020-10-22 RX ORDER — HYDROCODONE BITARTRATE AND ACETAMINOPHEN 7.5; 325 MG/1; MG/1
1 TABLET ORAL 3 TIMES DAILY
Qty: 30 TABLET | Refills: 0 | Status: SHIPPED | OUTPATIENT
Start: 2020-10-22 | End: 2020-11-23 | Stop reason: SDUPTHER

## 2020-10-22 RX ORDER — FLUTICASONE FUROATE, UMECLIDINIUM BROMIDE AND VILANTEROL TRIFENATATE 100; 62.5; 25 UG/1; UG/1; UG/1
1 POWDER RESPIRATORY (INHALATION) DAILY
Qty: 2 INHALER | Refills: 0 | Status: SHIPPED | COMMUNITY
Start: 2020-10-22 | End: 2020-11-24 | Stop reason: SDUPTHER

## 2020-10-23 ENCOUNTER — TELEPHONE (OUTPATIENT)
Dept: FAMILY MEDICINE CLINIC | Facility: CLINIC | Age: 50
End: 2020-10-23

## 2020-10-23 RX ORDER — AZITHROMYCIN 250 MG/1
TABLET, FILM COATED ORAL
Qty: 6 TABLET | Refills: 0 | Status: SHIPPED | OUTPATIENT
Start: 2020-10-23 | End: 2020-10-28

## 2020-10-23 RX ORDER — METHYLPREDNISOLONE 4 MG/1
TABLET ORAL
Qty: 21 EACH | Refills: 0 | Status: SHIPPED | OUTPATIENT
Start: 2020-10-23 | End: 2020-12-21

## 2020-11-23 ENCOUNTER — OFFICE VISIT (OUTPATIENT)
Dept: FAMILY MEDICINE CLINIC | Facility: CLINIC | Age: 50
End: 2020-11-23
Payer: COMMERCIAL

## 2020-11-23 VITALS
HEART RATE: 68 BPM | DIASTOLIC BLOOD PRESSURE: 74 MMHG | SYSTOLIC BLOOD PRESSURE: 132 MMHG | RESPIRATION RATE: 20 BRPM | BODY MASS INDEX: 25.21 KG/M2 | TEMPERATURE: 97.2 F | HEIGHT: 62 IN | WEIGHT: 137 LBS

## 2020-11-23 DIAGNOSIS — G89.4 CHRONIC PAIN SYNDROME: ICD-10-CM

## 2020-11-23 DIAGNOSIS — J45.40 MODERATE PERSISTENT ASTHMA, UNSPECIFIED WHETHER COMPLICATED: ICD-10-CM

## 2020-11-23 DIAGNOSIS — M54.2 CHRONIC NECK AND BACK PAIN: ICD-10-CM

## 2020-11-23 DIAGNOSIS — M54.50 CHRONIC BILATERAL LOW BACK PAIN WITHOUT SCIATICA: Primary | ICD-10-CM

## 2020-11-23 DIAGNOSIS — Z72.0 TOBACCO ABUSE DISORDER: ICD-10-CM

## 2020-11-23 DIAGNOSIS — G89.29 CHRONIC NECK AND BACK PAIN: ICD-10-CM

## 2020-11-23 DIAGNOSIS — G89.29 CHRONIC BILATERAL LOW BACK PAIN WITHOUT SCIATICA: Primary | ICD-10-CM

## 2020-11-23 DIAGNOSIS — J44.9 CHRONIC OBSTRUCTIVE PULMONARY DISEASE, UNSPECIFIED COPD TYPE (HCC): ICD-10-CM

## 2020-11-23 DIAGNOSIS — M54.9 CHRONIC NECK AND BACK PAIN: ICD-10-CM

## 2020-11-23 PROCEDURE — 4004F PT TOBACCO SCREEN RCVD TLK: CPT | Performed by: FAMILY MEDICINE

## 2020-11-23 PROCEDURE — 99213 OFFICE O/P EST LOW 20 MIN: CPT | Performed by: FAMILY MEDICINE

## 2020-11-23 PROCEDURE — 3008F BODY MASS INDEX DOCD: CPT | Performed by: FAMILY MEDICINE

## 2020-11-23 PROCEDURE — 3075F SYST BP GE 130 - 139MM HG: CPT | Performed by: FAMILY MEDICINE

## 2020-11-23 PROCEDURE — 3078F DIAST BP <80 MM HG: CPT | Performed by: FAMILY MEDICINE

## 2020-11-23 RX ORDER — HYDROCODONE BITARTRATE AND ACETAMINOPHEN 7.5; 325 MG/1; MG/1
1 TABLET ORAL 3 TIMES DAILY
Qty: 30 TABLET | Refills: 0 | Status: SHIPPED | OUTPATIENT
Start: 2020-11-23 | End: 2020-12-21 | Stop reason: SDUPTHER

## 2020-11-24 DIAGNOSIS — J30.2 SEASONAL ALLERGIES: ICD-10-CM

## 2020-11-24 RX ORDER — FLUTICASONE FUROATE, UMECLIDINIUM BROMIDE AND VILANTEROL TRIFENATATE 100; 62.5; 25 UG/1; UG/1; UG/1
1 POWDER RESPIRATORY (INHALATION) DAILY
Qty: 2 INHALER | Refills: 0 | Status: SHIPPED | COMMUNITY
Start: 2020-11-24 | End: 2020-12-21 | Stop reason: SDUPTHER

## 2020-12-10 ENCOUNTER — TELEPHONE (OUTPATIENT)
Dept: ADMINISTRATIVE | Facility: OTHER | Age: 50
End: 2020-12-10

## 2020-12-21 ENCOUNTER — OFFICE VISIT (OUTPATIENT)
Dept: FAMILY MEDICINE CLINIC | Facility: CLINIC | Age: 50
End: 2020-12-21
Payer: COMMERCIAL

## 2020-12-21 VITALS
TEMPERATURE: 97.7 F | WEIGHT: 139 LBS | SYSTOLIC BLOOD PRESSURE: 128 MMHG | HEIGHT: 62 IN | DIASTOLIC BLOOD PRESSURE: 74 MMHG | RESPIRATION RATE: 20 BRPM | BODY MASS INDEX: 25.58 KG/M2 | OXYGEN SATURATION: 99 % | HEART RATE: 68 BPM

## 2020-12-21 DIAGNOSIS — F31.70 BIPOLAR DISORDER IN FULL REMISSION, MOST RECENT EPISODE UNSPECIFIED TYPE (HCC): ICD-10-CM

## 2020-12-21 DIAGNOSIS — J43.1 PANLOBULAR EMPHYSEMA (HCC): ICD-10-CM

## 2020-12-21 DIAGNOSIS — J30.2 SEASONAL ALLERGIES: ICD-10-CM

## 2020-12-21 DIAGNOSIS — F33.41 RECURRENT MAJOR DEPRESSIVE DISORDER, IN PARTIAL REMISSION (HCC): ICD-10-CM

## 2020-12-21 DIAGNOSIS — M54.50 CHRONIC BILATERAL LOW BACK PAIN WITHOUT SCIATICA: Primary | ICD-10-CM

## 2020-12-21 DIAGNOSIS — Z72.0 TOBACCO ABUSE DISORDER: ICD-10-CM

## 2020-12-21 DIAGNOSIS — G89.29 CHRONIC BILATERAL LOW BACK PAIN WITHOUT SCIATICA: Primary | ICD-10-CM

## 2020-12-21 DIAGNOSIS — J44.9 CHRONIC OBSTRUCTIVE PULMONARY DISEASE, UNSPECIFIED COPD TYPE (HCC): ICD-10-CM

## 2020-12-21 DIAGNOSIS — J45.40 MODERATE PERSISTENT ASTHMA, UNSPECIFIED WHETHER COMPLICATED: ICD-10-CM

## 2020-12-21 DIAGNOSIS — G89.4 CHRONIC PAIN SYNDROME: ICD-10-CM

## 2020-12-21 DIAGNOSIS — Z88.9 HISTORY OF SEASONAL ALLERGIES: ICD-10-CM

## 2020-12-21 DIAGNOSIS — E78.2 MIXED HYPERLIPIDEMIA: ICD-10-CM

## 2020-12-21 PROCEDURE — 99214 OFFICE O/P EST MOD 30 MIN: CPT | Performed by: FAMILY MEDICINE

## 2020-12-21 PROCEDURE — 3008F BODY MASS INDEX DOCD: CPT | Performed by: FAMILY MEDICINE

## 2020-12-21 PROCEDURE — 3074F SYST BP LT 130 MM HG: CPT | Performed by: FAMILY MEDICINE

## 2020-12-21 PROCEDURE — 3078F DIAST BP <80 MM HG: CPT | Performed by: FAMILY MEDICINE

## 2020-12-21 RX ORDER — ATORVASTATIN CALCIUM 10 MG/1
10 TABLET, FILM COATED ORAL DAILY
Qty: 30 TABLET | Refills: 5 | Status: SHIPPED | OUTPATIENT
Start: 2020-12-21 | End: 2021-06-10 | Stop reason: SDUPTHER

## 2020-12-21 RX ORDER — BENZONATATE 200 MG/1
200 CAPSULE ORAL 3 TIMES DAILY PRN
Qty: 30 CAPSULE | Refills: 2 | Status: SHIPPED | OUTPATIENT
Start: 2020-12-21 | End: 2021-03-16 | Stop reason: SDUPTHER

## 2020-12-21 RX ORDER — QUETIAPINE FUMARATE 100 MG/1
100 TABLET, FILM COATED ORAL
Qty: 90 TABLET | Refills: 1 | Status: SHIPPED | OUTPATIENT
Start: 2020-12-21 | End: 2021-04-13 | Stop reason: SDUPTHER

## 2020-12-21 RX ORDER — LORATADINE 10 MG/1
10 TABLET ORAL DAILY
Qty: 30 TABLET | Refills: 5 | Status: SHIPPED | OUTPATIENT
Start: 2020-12-21 | End: 2021-06-10 | Stop reason: SDUPTHER

## 2020-12-21 RX ORDER — HYDROCODONE BITARTRATE AND ACETAMINOPHEN 7.5; 325 MG/1; MG/1
1 TABLET ORAL 3 TIMES DAILY
Qty: 30 TABLET | Refills: 0 | Status: SHIPPED | OUTPATIENT
Start: 2020-12-21 | End: 2021-01-19 | Stop reason: SDUPTHER

## 2020-12-21 RX ORDER — MIRTAZAPINE 45 MG/1
45 TABLET, FILM COATED ORAL
Qty: 30 TABLET | Refills: 2 | Status: SHIPPED | OUTPATIENT
Start: 2020-12-21 | End: 2021-03-16 | Stop reason: SDUPTHER

## 2020-12-28 RX ORDER — FLUTICASONE FUROATE, UMECLIDINIUM BROMIDE AND VILANTEROL TRIFENATATE 100; 62.5; 25 UG/1; UG/1; UG/1
1 POWDER RESPIRATORY (INHALATION) DAILY
Qty: 2 INHALER | Refills: 0 | Status: SHIPPED | COMMUNITY
Start: 2020-12-28 | End: 2021-01-19 | Stop reason: SDUPTHER

## 2021-01-19 ENCOUNTER — OFFICE VISIT (OUTPATIENT)
Dept: FAMILY MEDICINE CLINIC | Facility: CLINIC | Age: 51
End: 2021-01-19
Payer: COMMERCIAL

## 2021-01-19 VITALS
RESPIRATION RATE: 20 BRPM | WEIGHT: 140 LBS | DIASTOLIC BLOOD PRESSURE: 74 MMHG | HEART RATE: 88 BPM | HEIGHT: 62 IN | SYSTOLIC BLOOD PRESSURE: 126 MMHG | BODY MASS INDEX: 25.76 KG/M2 | TEMPERATURE: 96.5 F

## 2021-01-19 DIAGNOSIS — J30.2 SEASONAL ALLERGIES: ICD-10-CM

## 2021-01-19 DIAGNOSIS — M54.2 CHRONIC NECK AND BACK PAIN: ICD-10-CM

## 2021-01-19 DIAGNOSIS — E55.9 VITAMIN D DEFICIENCY: ICD-10-CM

## 2021-01-19 DIAGNOSIS — Z72.0 TOBACCO ABUSE DISORDER: ICD-10-CM

## 2021-01-19 DIAGNOSIS — G89.29 CHRONIC NECK AND BACK PAIN: ICD-10-CM

## 2021-01-19 DIAGNOSIS — J43.1 PANLOBULAR EMPHYSEMA (HCC): ICD-10-CM

## 2021-01-19 DIAGNOSIS — G89.29 CHRONIC BILATERAL LOW BACK PAIN WITHOUT SCIATICA: Primary | ICD-10-CM

## 2021-01-19 DIAGNOSIS — G89.4 CHRONIC PAIN SYNDROME: ICD-10-CM

## 2021-01-19 DIAGNOSIS — M54.50 CHRONIC BILATERAL LOW BACK PAIN WITHOUT SCIATICA: Primary | ICD-10-CM

## 2021-01-19 DIAGNOSIS — E78.5 HYPERLIPIDEMIA, UNSPECIFIED HYPERLIPIDEMIA TYPE: ICD-10-CM

## 2021-01-19 DIAGNOSIS — M54.9 CHRONIC NECK AND BACK PAIN: ICD-10-CM

## 2021-01-19 PROBLEM — F17.200 TOBACCO USE DISORDER: Status: ACTIVE | Noted: 2021-01-19

## 2021-01-19 PROBLEM — E78.2 MIXED HYPERLIPIDEMIA: Status: ACTIVE | Noted: 2021-01-19

## 2021-01-19 PROCEDURE — 3074F SYST BP LT 130 MM HG: CPT | Performed by: FAMILY MEDICINE

## 2021-01-19 PROCEDURE — 3078F DIAST BP <80 MM HG: CPT | Performed by: FAMILY MEDICINE

## 2021-01-19 PROCEDURE — 3008F BODY MASS INDEX DOCD: CPT | Performed by: FAMILY MEDICINE

## 2021-01-19 PROCEDURE — 99213 OFFICE O/P EST LOW 20 MIN: CPT | Performed by: FAMILY MEDICINE

## 2021-01-19 RX ORDER — HYDROCODONE BITARTRATE AND ACETAMINOPHEN 7.5; 325 MG/1; MG/1
1 TABLET ORAL 3 TIMES DAILY
Qty: 30 TABLET | Refills: 0 | Status: SHIPPED | OUTPATIENT
Start: 2021-01-19 | End: 2021-02-16 | Stop reason: SDUPTHER

## 2021-01-19 RX ORDER — FLUTICASONE FUROATE, UMECLIDINIUM BROMIDE AND VILANTEROL TRIFENATATE 100; 62.5; 25 UG/1; UG/1; UG/1
1 POWDER RESPIRATORY (INHALATION) DAILY
Qty: 2 INHALER | Refills: 0 | Status: SHIPPED | COMMUNITY
Start: 2021-01-19 | End: 2021-02-16 | Stop reason: SDUPTHER

## 2021-01-19 NOTE — PROGRESS NOTES
Assessment/Plan:  Chronic pain syndrome with chronic bilateral low back pain without sciatica  PDMP reviewed no concerns  No evidence of abuse or divergence  Opioid therapy improves function  Panlobular emphysema the patient uses trilogy as a maintenance inhaler uses albuterol 1 to 2 times a day  Seasonal allergies    Problem List Items Addressed This Visit        Other    Chronic bilateral low back pain without sciatica - Primary      Other Visit Diagnoses     Chronic pain syndrome        Relevant Medications    HYDROcodone-acetaminophen (Norco) 7 5-325 mg per tablet    Panlobular emphysema (HCC)        Tobacco abuse disorder        Seasonal allergies        Tobacco use disorder               Diagnoses and all orders for this visit:    Chronic bilateral low back pain without sciatica    Chronic pain syndrome  -     HYDROcodone-acetaminophen (Norco) 7 5-325 mg per tablet; Take 1 tablet by mouth 3 (three) times a dayMax Daily Amount: 3 tablets    Panlobular emphysema (HCC)    Tobacco abuse disorder    Seasonal allergies  -     fluticasone-umeclidinium-vilanterol (Trelegy Ellipta) 100-62 5-25 MCG/INH inhaler; Inhale 1 puff daily Rinse mouth after use  Tobacco use disorder        No problem-specific Assessment & Plan notes found for this encounter  PHQ-9 Depression Screening    PHQ-9:   Frequency of the following problems over the past two weeks: Body mass index is 25 61 kg/m²  BMI Counseling: Body mass index is 25 61 kg/m²  The BMI     Subjective:      Patient ID: Dalila Sellers is a 53 y o  female      Patient presents for regular checkup for chronic neck and low back pain, and COPD with asthmatic component, and tobacco use disorder      The following portions of the patient's history were reviewed and updated as appropriate:   She has a past medical history of Anxiety, Asthma, Chronic low back pain, COPD (chronic obstructive pulmonary disease) ([address] ), DDD (degenerative disc disease), cervical, and Hypertension  ,  does not have any pertinent problems on file  ,   has a past surgical history that includes Hernia repair and Chest tube insertion  ,  family history includes Anxiety disorder in her other; Arthritis in her other; Cancer in her other; Depression in her other; Diabetes in her other; Heart disease in her other  ,   reports that she has been smoking  She has never used smokeless tobacco  She reports current alcohol use  She reports previous drug use ,  is allergic to erythromycin     Current Outpatient Medications   Medication Sig Dispense Refill    atorvastatin (LIPITOR) 10 mg tablet Take 1 tablet (10 mg total) by mouth daily 30 tablet 5    benzonatate (TESSALON) 200 MG capsule Take 1 capsule (200 mg total) by mouth 3 (three) times a day as needed for cough 30 capsule 2    calcium carbonate (OS-ONELIA) 600 MG tablet Take 1 tablet (600 mg total) by mouth 2 (two) times a day with meals 180 tablet 1    diclofenac (VOLTAREN) 75 mg EC tablet Take 1 tablet (75 mg total) by mouth 2 (two) times a day 60 tablet 5    fluticasone-umeclidinium-vilanterol (Trelegy Ellipta) 100-62 5-25 MCG/INH inhaler Inhale 1 puff daily Rinse mouth after use   2 Inhaler 0    HYDROcodone-acetaminophen (Norco) 7 5-325 mg per tablet Take 1 tablet by mouth 3 (three) times a dayMax Daily Amount: 3 tablets 30 tablet 0    hydrOXYzine pamoate (VISTARIL) 50 mg capsule Take 50 mg by mouth Three times daily as needed      loratadine (CLARITIN) 10 mg tablet Take 1 tablet (10 mg total) by mouth daily 30 tablet 5    methocarbamol (ROBAXIN) 750 mg tablet take 1 tablet by mouth four times a day 120 tablet 3    mirtazapine (REMERON) 45 MG tablet Take 1 tablet (45 mg total) by mouth daily at bedtime 30 tablet 2    Multiple Vitamins-Minerals (MULTIVITAMIN ADULT PO) Take by mouth      QUEtiapine (SEROquel) 100 mg tablet Take 1 tablet (100 mg total) by mouth daily at bedtime 90 tablet 1    RA Vitamin B-6 50 MG tablet take 1 tablet by mouth three times a day 90 tablet 5    topiramate (TOPAMAX) 50 MG tablet Take 50 mg by mouth 2 (two) times a day      TURMERIC PO Take 1 tablet by mouth       No current facility-administered medications for this visit  Review of Systems   Constitutional: Negative for chills and fever  HENT: Negative for ear pain and sore throat  Eyes: Negative for pain and visual disturbance  Respiratory: Positive for shortness of breath and wheezing  Cardiovascular: Negative for chest pain and palpitations  Gastrointestinal: Negative for abdominal pain and vomiting  Genitourinary: Negative for dysuria and hematuria  Musculoskeletal: Positive for back pain and neck pain  Negative for arthralgias  Patient has chronic neck and low back pain graded on 06/08 per 10 opioid therapy improves function   Skin: Negative for color change and rash  Neurological: Negative for seizures and syncope  All other systems reviewed and are negative  Objective:    /74   Pulse 88   Temp (!) 96 5 °F (35 8 °C)   Resp 20   Ht 5' 2" (1 575 m)   Wt 63 5 kg (140 lb)   BMI 25 61 kg/m²   Body mass index is 25 61 kg/m²  Physical Exam  Constitutional:       Appearance: She is well-developed  HENT:      Head: Normocephalic  Eyes:      Pupils: Pupils are equal, round, and reactive to light  Neck:      Musculoskeletal: Normal range of motion  Cardiovascular:      Rate and Rhythm: Normal rate and regular rhythm  Heart sounds: Normal heart sounds  Pulmonary:      Effort: Pulmonary effort is normal       Breath sounds: Rhonchi present  Abdominal:      General: Bowel sounds are normal       Palpations: Abdomen is soft  Tenderness: There is no abdominal tenderness  Musculoskeletal:      Comments: Decreased range of motion of the cervical spine with paraspinal tenderness paraspinal spasm of the lumbar spine with SI tenderness bilaterally   Skin:     General: Skin is warm     Neurological: Mental Status: She is alert and oriented to person, place, and time

## 2021-02-16 ENCOUNTER — OFFICE VISIT (OUTPATIENT)
Dept: FAMILY MEDICINE CLINIC | Facility: CLINIC | Age: 51
End: 2021-02-16
Payer: COMMERCIAL

## 2021-02-16 VITALS
BODY MASS INDEX: 26.5 KG/M2 | HEART RATE: 84 BPM | DIASTOLIC BLOOD PRESSURE: 84 MMHG | TEMPERATURE: 98.2 F | RESPIRATION RATE: 20 BRPM | HEIGHT: 62 IN | WEIGHT: 144 LBS | SYSTOLIC BLOOD PRESSURE: 132 MMHG

## 2021-02-16 DIAGNOSIS — G89.29 CHRONIC BILATERAL LOW BACK PAIN WITHOUT SCIATICA: Primary | ICD-10-CM

## 2021-02-16 DIAGNOSIS — G89.4 CHRONIC PAIN SYNDROME: ICD-10-CM

## 2021-02-16 DIAGNOSIS — M54.2 CHRONIC NECK AND BACK PAIN: ICD-10-CM

## 2021-02-16 DIAGNOSIS — J30.2 SEASONAL ALLERGIES: ICD-10-CM

## 2021-02-16 DIAGNOSIS — M54.50 CHRONIC BILATERAL LOW BACK PAIN WITHOUT SCIATICA: Primary | ICD-10-CM

## 2021-02-16 DIAGNOSIS — F31.70 BIPOLAR DISORDER IN FULL REMISSION, MOST RECENT EPISODE UNSPECIFIED TYPE (HCC): ICD-10-CM

## 2021-02-16 DIAGNOSIS — G89.29 CHRONIC NECK AND BACK PAIN: ICD-10-CM

## 2021-02-16 DIAGNOSIS — E78.2 MIXED HYPERLIPIDEMIA: ICD-10-CM

## 2021-02-16 DIAGNOSIS — M54.9 CHRONIC NECK AND BACK PAIN: ICD-10-CM

## 2021-02-16 DIAGNOSIS — F17.200 TOBACCO USE DISORDER: ICD-10-CM

## 2021-02-16 DIAGNOSIS — J43.1 PANLOBULAR EMPHYSEMA (HCC): ICD-10-CM

## 2021-02-16 PROCEDURE — 99213 OFFICE O/P EST LOW 20 MIN: CPT | Performed by: FAMILY MEDICINE

## 2021-02-16 RX ORDER — FLUTICASONE FUROATE, UMECLIDINIUM BROMIDE AND VILANTEROL TRIFENATATE 100; 62.5; 25 UG/1; UG/1; UG/1
1 POWDER RESPIRATORY (INHALATION) DAILY
Qty: 2 INHALER | Refills: 0 | Status: SHIPPED | COMMUNITY
Start: 2021-02-16 | End: 2021-03-16 | Stop reason: SDUPTHER

## 2021-02-16 RX ORDER — HYDROCODONE BITARTRATE AND ACETAMINOPHEN 7.5; 325 MG/1; MG/1
1 TABLET ORAL 3 TIMES DAILY
Qty: 30 TABLET | Refills: 0 | Status: SHIPPED | OUTPATIENT
Start: 2021-02-16 | End: 2021-03-16 | Stop reason: SDUPTHER

## 2021-02-16 NOTE — PROGRESS NOTES
Assessment/Plan:  Chronic pain syndrome chronic bilateral low back pain without sciatica  PDMP reviewed no concerns no evidence of abuse or divergence opioid therapy improves function    Panlobular emphysema uses trilogy as a maintenance inhaler uses albuterol 1 to 2 times a day    Tobacco use disorder the patient is down to 1 pack of cigarettes per day  Bipolar disorder Remeron and Seroquel effective therapy    Hyperlipidemia on atorvastatin    Problem List Items Addressed This Visit     None      Visit Diagnoses     Chronic pain syndrome        Relevant Medications    HYDROcodone-acetaminophen (Norco) 7 5-325 mg per tablet           Diagnoses and all orders for this visit:    Chronic pain syndrome  -     HYDROcodone-acetaminophen (Norco) 7 5-325 mg per tablet; Take 1 tablet by mouth 3 (three) times a dayMax Daily Amount: 3 tablets        No problem-specific Assessment & Plan notes found for this encounter  PHQ-9 Depression Screening    PHQ-9:   Frequency of the following problems over the past two weeks: Body mass index is 26 34 kg/m²  BMI Counseling: Body mass index is 26 34 kg/m²  The BMI     Subjective:      Patient ID: Bill Smallwood is a 53 y o  female  Patient presents for monthly checkup for chronic low back pain, chronic obstructive pulmonary disease, bipolar disorder, tobacco use disorder      The following portions of the patient's history were reviewed and updated as appropriate:   She has a past medical history of Anxiety, Asthma, Chronic low back pain, COPD (chronic obstructive pulmonary disease) ([address] ), DDD (degenerative disc disease), cervical, and Hypertension  ,  does not have any pertinent problems on file  ,   has a past surgical history that includes Hernia repair and Chest tube insertion  ,  family history includes Anxiety disorder in her other; Arthritis in her other; Cancer in her other; Depression in her other; Diabetes in her other; Heart disease in her other  , reports that she has been smoking  She has never used smokeless tobacco  She reports current alcohol use  She reports previous drug use ,  is allergic to erythromycin     Current Outpatient Medications   Medication Sig Dispense Refill    atorvastatin (LIPITOR) 10 mg tablet Take 1 tablet (10 mg total) by mouth daily 30 tablet 5    benzonatate (TESSALON) 200 MG capsule Take 1 capsule (200 mg total) by mouth 3 (three) times a day as needed for cough 30 capsule 2    calcium carbonate (OS-ONELIA) 600 MG tablet Take 1 tablet (600 mg total) by mouth 2 (two) times a day with meals 180 tablet 1    diclofenac (VOLTAREN) 75 mg EC tablet Take 1 tablet (75 mg total) by mouth 2 (two) times a day 60 tablet 5    fluticasone-umeclidinium-vilanterol (Trelegy Ellipta) 100-62 5-25 MCG/INH inhaler Inhale 1 puff daily Rinse mouth after use  2 Inhaler 0    HYDROcodone-acetaminophen (Norco) 7 5-325 mg per tablet Take 1 tablet by mouth 3 (three) times a dayMax Daily Amount: 3 tablets 30 tablet 0    hydrOXYzine pamoate (VISTARIL) 50 mg capsule Take 50 mg by mouth Three times daily as needed      loratadine (CLARITIN) 10 mg tablet Take 1 tablet (10 mg total) by mouth daily 30 tablet 5    methocarbamol (ROBAXIN) 750 mg tablet take 1 tablet by mouth four times a day 120 tablet 3    mirtazapine (REMERON) 45 MG tablet Take 1 tablet (45 mg total) by mouth daily at bedtime 30 tablet 2    Multiple Vitamins-Minerals (MULTIVITAMIN ADULT PO) Take by mouth      QUEtiapine (SEROquel) 100 mg tablet Take 1 tablet (100 mg total) by mouth daily at bedtime 90 tablet 1    RA Vitamin B-6 50 MG tablet take 1 tablet by mouth three times a day 90 tablet 5    topiramate (TOPAMAX) 50 MG tablet Take 50 mg by mouth 2 (two) times a day      TURMERIC PO Take 1 tablet by mouth       No current facility-administered medications for this visit  Review of Systems   Constitutional: Negative  HENT: Negative  Eyes: Negative  Respiratory: Negative  Cardiovascular: Negative  Gastrointestinal: Negative  Endocrine: Negative  Genitourinary: Negative  Musculoskeletal: Negative  Skin: Negative  Allergic/Immunologic: Negative  Neurological: Negative  Hematological: Negative  Psychiatric/Behavioral: Negative  Objective:    /84   Pulse 84   Temp 98 2 °F (36 8 °C)   Resp 20   Ht 5' 2" (1 575 m)   Wt 65 3 kg (144 lb)   BMI 26 34 kg/m²   Body mass index is 26 34 kg/m²  Physical Exam  Constitutional:       Appearance: She is well-developed  HENT:      Head: Normocephalic  Eyes:      Pupils: Pupils are equal, round, and reactive to light  Neck:      Musculoskeletal: Normal range of motion  Cardiovascular:      Rate and Rhythm: Normal rate and regular rhythm  Heart sounds: Normal heart sounds  Pulmonary:      Effort: Pulmonary effort is normal       Breath sounds: Normal breath sounds  Abdominal:      General: Bowel sounds are normal       Palpations: Abdomen is soft  Tenderness: There is no abdominal tenderness  Skin:     General: Skin is warm  Neurological:      Mental Status: She is alert and oriented to person, place, and time

## 2021-02-18 DIAGNOSIS — M54.50 CHRONIC BILATERAL LOW BACK PAIN WITHOUT SCIATICA: ICD-10-CM

## 2021-02-18 DIAGNOSIS — G89.29 CHRONIC BILATERAL LOW BACK PAIN WITHOUT SCIATICA: ICD-10-CM

## 2021-02-19 RX ORDER — PHENOL 1.4 %
AEROSOL, SPRAY (ML) MUCOUS MEMBRANE
Qty: 180 TABLET | Refills: 1 | Status: SHIPPED | OUTPATIENT
Start: 2021-02-19 | End: 2021-09-09 | Stop reason: SDUPTHER

## 2021-03-16 ENCOUNTER — OFFICE VISIT (OUTPATIENT)
Dept: FAMILY MEDICINE CLINIC | Facility: CLINIC | Age: 51
End: 2021-03-16
Payer: COMMERCIAL

## 2021-03-16 VITALS
DIASTOLIC BLOOD PRESSURE: 74 MMHG | WEIGHT: 144 LBS | SYSTOLIC BLOOD PRESSURE: 128 MMHG | HEART RATE: 84 BPM | TEMPERATURE: 97 F | BODY MASS INDEX: 26.5 KG/M2 | RESPIRATION RATE: 20 BRPM | HEIGHT: 62 IN

## 2021-03-16 DIAGNOSIS — G89.4 CHRONIC PAIN SYNDROME: ICD-10-CM

## 2021-03-16 DIAGNOSIS — F41.9 ANXIETY: Primary | ICD-10-CM

## 2021-03-16 DIAGNOSIS — J30.2 SEASONAL ALLERGIES: ICD-10-CM

## 2021-03-16 DIAGNOSIS — M54.9 CHRONIC NECK AND BACK PAIN: ICD-10-CM

## 2021-03-16 DIAGNOSIS — F17.200 TOBACCO USE DISORDER: ICD-10-CM

## 2021-03-16 DIAGNOSIS — G89.29 CHRONIC NECK AND BACK PAIN: ICD-10-CM

## 2021-03-16 DIAGNOSIS — G89.29 CHRONIC BILATERAL LOW BACK PAIN WITHOUT SCIATICA: ICD-10-CM

## 2021-03-16 DIAGNOSIS — F33.41 RECURRENT MAJOR DEPRESSIVE DISORDER, IN PARTIAL REMISSION (HCC): ICD-10-CM

## 2021-03-16 DIAGNOSIS — J44.9 CHRONIC OBSTRUCTIVE PULMONARY DISEASE, UNSPECIFIED COPD TYPE (HCC): ICD-10-CM

## 2021-03-16 DIAGNOSIS — M54.2 CHRONIC NECK AND BACK PAIN: ICD-10-CM

## 2021-03-16 DIAGNOSIS — M54.50 CHRONIC BILATERAL LOW BACK PAIN WITHOUT SCIATICA: ICD-10-CM

## 2021-03-16 PROCEDURE — 99213 OFFICE O/P EST LOW 20 MIN: CPT | Performed by: FAMILY MEDICINE

## 2021-03-16 PROCEDURE — 3008F BODY MASS INDEX DOCD: CPT | Performed by: FAMILY MEDICINE

## 2021-03-16 PROCEDURE — 4004F PT TOBACCO SCREEN RCVD TLK: CPT | Performed by: FAMILY MEDICINE

## 2021-03-16 RX ORDER — HYDROXYZINE PAMOATE 50 MG/1
50 CAPSULE ORAL 3 TIMES DAILY PRN
Qty: 30 CAPSULE | Refills: 5 | Status: SHIPPED | OUTPATIENT
Start: 2021-03-16 | End: 2021-09-09 | Stop reason: SDUPTHER

## 2021-03-16 RX ORDER — BENZONATATE 200 MG/1
200 CAPSULE ORAL 3 TIMES DAILY PRN
Qty: 30 CAPSULE | Refills: 2 | Status: SHIPPED | OUTPATIENT
Start: 2021-03-16 | End: 2021-06-10 | Stop reason: SDUPTHER

## 2021-03-16 RX ORDER — FLUTICASONE FUROATE, UMECLIDINIUM BROMIDE AND VILANTEROL TRIFENATATE 100; 62.5; 25 UG/1; UG/1; UG/1
1 POWDER RESPIRATORY (INHALATION) DAILY
Qty: 2 INHALER | Refills: 0 | Status: SHIPPED | COMMUNITY
Start: 2021-03-16 | End: 2021-04-13 | Stop reason: SDUPTHER

## 2021-03-16 RX ORDER — MIRTAZAPINE 45 MG/1
45 TABLET, FILM COATED ORAL
Qty: 30 TABLET | Refills: 2 | Status: SHIPPED | OUTPATIENT
Start: 2021-03-16 | End: 2021-06-10 | Stop reason: SDUPTHER

## 2021-03-16 RX ORDER — HYDROCODONE BITARTRATE AND ACETAMINOPHEN 7.5; 325 MG/1; MG/1
1 TABLET ORAL 3 TIMES DAILY
Qty: 30 TABLET | Refills: 0 | Status: SHIPPED | OUTPATIENT
Start: 2021-03-16 | End: 2021-04-13 | Stop reason: SDUPTHER

## 2021-03-16 NOTE — PROGRESS NOTES
Assessment/Plan: chronic pain syndrome with chronic bilateral low back pain without sciatica  Chronic neck pain  PDMP reviewed no concerns no evidence of abuse or divergence opioid therapy improves function    Chronic obstructive pulmonary disease with asthmatic component patient uses trilogy as a maintenance inhaler and albuterol as a rescue inhaler states she really needs rescue inhaler  Patient continues with tobacco use disorder in spite of pulmonary issues  The patient is not interested in stopping at this point    Bipolar disorder Remeron effective therapy with Seroquel    Hyperlipidemia on atorvastatin laboratories pending    Problem List Items Addressed This Visit        Other    Chronic bilateral low back pain without sciatica    Tobacco use disorder      Other Visit Diagnoses     Anxiety    -  Primary    Relevant Medications    hydrOXYzine pamoate (VISTARIL) 50 mg capsule    Chronic pain syndrome        Relevant Medications    HYDROcodone-acetaminophen (Norco) 7 5-325 mg per tablet    Chronic obstructive pulmonary disease, unspecified COPD type (HCC)        Relevant Medications    benzonatate (TESSALON) 200 MG capsule    fluticasone-umeclidinium-vilanterol (Trelegy Ellipta) 100-62 5-25 MCG/INH inhaler    Recurrent major depressive disorder, in partial remission (HCC)        Relevant Medications    hydrOXYzine pamoate (VISTARIL) 50 mg capsule    mirtazapine (REMERON) 45 MG tablet    Seasonal allergies        Relevant Medications    fluticasone-umeclidinium-vilanterol (Trelegy Ellipta) 100-62 5-25 MCG/INH inhaler    Chronic neck and back pain               Diagnoses and all orders for this visit:    Anxiety  -     hydrOXYzine pamoate (VISTARIL) 50 mg capsule; Take 1 capsule (50 mg total) by mouth 3 (three) times a day as needed for itching    Chronic pain syndrome  -     HYDROcodone-acetaminophen (Norco) 7 5-325 mg per tablet;  Take 1 tablet by mouth 3 (three) times a dayMax Daily Amount: 3 tablets    Chronic obstructive pulmonary disease, unspecified COPD type (HCC)  -     benzonatate (TESSALON) 200 MG capsule; Take 1 capsule (200 mg total) by mouth 3 (three) times a day as needed for cough    Recurrent major depressive disorder, in partial remission (HCC)  -     mirtazapine (REMERON) 45 MG tablet; Take 1 tablet (45 mg total) by mouth daily at bedtime    Seasonal allergies  -     fluticasone-umeclidinium-vilanterol (Trelegy Ellipta) 100-62 5-25 MCG/INH inhaler; Inhale 1 puff daily Rinse mouth after use  Tobacco use disorder    Chronic bilateral low back pain without sciatica    Chronic neck and back pain        No problem-specific Assessment & Plan notes found for this encounter  PHQ-9 Depression Screening    PHQ-9:   Frequency of the following problems over the past two weeks: Body mass index is 26 34 kg/m²  BMI Counseling: Body mass index is 26 34 kg/m²  The BMI     Subjective:      Patient ID: Aditi Calix is a 53 y o  female  Patient presents with chronic neck and back pain as well as COPD  Also anxiety with depression      The following portions of the patient's history were reviewed and updated as appropriate:   She has a past medical history of Anxiety, Asthma, Chronic low back pain, COPD (chronic obstructive pulmonary disease) ([address] ), DDD (degenerative disc disease), cervical, and Hypertension  ,  does not have any pertinent problems on file  ,   has a past surgical history that includes Hernia repair and Chest tube insertion  ,  family history includes Anxiety disorder in her other; Arthritis in her other; Cancer in her other; Depression in her other; Diabetes in her other; Heart disease in her other  ,   reports that she has been smoking  She has never used smokeless tobacco  She reports current alcohol use  She reports previous drug use ,  is allergic to erythromycin     Current Outpatient Medications   Medication Sig Dispense Refill    atorvastatin (LIPITOR) 10 mg tablet Take 1 tablet (10 mg total) by mouth daily 30 tablet 5    benzonatate (TESSALON) 200 MG capsule Take 1 capsule (200 mg total) by mouth 3 (three) times a day as needed for cough 30 capsule 2    calcium carbonate (OS-ONELIA) 600 MG tablet take 1 tablet by mouth twice a day with meals 180 tablet 1    diclofenac (VOLTAREN) 75 mg EC tablet Take 1 tablet (75 mg total) by mouth 2 (two) times a day 60 tablet 5    fluticasone-umeclidinium-vilanterol (Trelegy Ellipta) 100-62 5-25 MCG/INH inhaler Inhale 1 puff daily Rinse mouth after use  2 Inhaler 0    HYDROcodone-acetaminophen (Norco) 7 5-325 mg per tablet Take 1 tablet by mouth 3 (three) times a dayMax Daily Amount: 3 tablets 30 tablet 0    hydrOXYzine pamoate (VISTARIL) 50 mg capsule Take 1 capsule (50 mg total) by mouth 3 (three) times a day as needed for itching 30 capsule 5    loratadine (CLARITIN) 10 mg tablet Take 1 tablet (10 mg total) by mouth daily 30 tablet 5    methocarbamol (ROBAXIN) 750 mg tablet take 1 tablet by mouth four times a day 120 tablet 3    mirtazapine (REMERON) 45 MG tablet Take 1 tablet (45 mg total) by mouth daily at bedtime 30 tablet 2    Multiple Vitamins-Minerals (MULTIVITAMIN ADULT PO) Take by mouth      QUEtiapine (SEROquel) 100 mg tablet Take 1 tablet (100 mg total) by mouth daily at bedtime 90 tablet 1    RA Vitamin B-6 50 MG tablet take 1 tablet by mouth three times a day 90 tablet 5    topiramate (TOPAMAX) 50 MG tablet Take 50 mg by mouth 2 (two) times a day      TURMERIC PO Take 1 tablet by mouth       No current facility-administered medications for this visit  Review of Systems   Constitutional: Negative for chills and fever  HENT: Negative for ear pain and sore throat  Eyes: Negative for pain and visual disturbance  Respiratory: Positive for shortness of breath and wheezing  Negative for cough  Cardiovascular: Negative for chest pain and palpitations     Gastrointestinal: Negative for abdominal pain and vomiting  Genitourinary: Negative for dysuria and hematuria  Musculoskeletal: Positive for back pain and neck pain  Negative for arthralgias  Skin: Negative for color change and rash  Neurological: Negative for seizures and syncope  All other systems reviewed and are negative  Objective:    /74   Pulse 84   Temp (!) 97 °F (36 1 °C)   Resp 20   Ht 5' 2" (1 575 m)   Wt 65 3 kg (144 lb)   BMI 26 34 kg/m²   Body mass index is 26 34 kg/m²  Physical Exam  Constitutional:       Appearance: She is well-developed  HENT:      Head: Normocephalic  Eyes:      Pupils: Pupils are equal, round, and reactive to light  Neck:      Musculoskeletal: Normal range of motion  Cardiovascular:      Rate and Rhythm: Normal rate and regular rhythm  Heart sounds: Normal heart sounds  Pulmonary:      Effort: Pulmonary effort is normal       Breath sounds: Normal breath sounds  Abdominal:      General: Bowel sounds are normal       Palpations: Abdomen is soft  Tenderness: There is no abdominal tenderness  Musculoskeletal:      Comments: Decreased range of motion of the cervical spine with paraspinal spasm    Paraspinal spasm of the lumbar spine with bilateral SI tenderness   Skin:     General: Skin is warm  Neurological:      Mental Status: She is alert and oriented to person, place, and time

## 2021-03-30 DIAGNOSIS — Z23 ENCOUNTER FOR IMMUNIZATION: ICD-10-CM

## 2021-03-31 ENCOUNTER — IMMUNIZATIONS (OUTPATIENT)
Dept: FAMILY MEDICINE CLINIC | Facility: HOSPITAL | Age: 51
End: 2021-03-31

## 2021-03-31 DIAGNOSIS — Z23 ENCOUNTER FOR IMMUNIZATION: Primary | ICD-10-CM

## 2021-03-31 PROCEDURE — 91301 SARS-COV-2 / COVID-19 MRNA VACCINE (MODERNA) 100 MCG: CPT

## 2021-03-31 PROCEDURE — 0011A SARS-COV-2 / COVID-19 MRNA VACCINE (MODERNA) 100 MCG: CPT

## 2021-04-13 ENCOUNTER — OFFICE VISIT (OUTPATIENT)
Dept: FAMILY MEDICINE CLINIC | Facility: CLINIC | Age: 51
End: 2021-04-13
Payer: COMMERCIAL

## 2021-04-13 VITALS
SYSTOLIC BLOOD PRESSURE: 126 MMHG | BODY MASS INDEX: 26.5 KG/M2 | HEIGHT: 62 IN | WEIGHT: 144 LBS | RESPIRATION RATE: 20 BRPM | TEMPERATURE: 98.2 F | DIASTOLIC BLOOD PRESSURE: 74 MMHG | HEART RATE: 84 BPM

## 2021-04-13 DIAGNOSIS — G89.4 CHRONIC PAIN SYNDROME: ICD-10-CM

## 2021-04-13 DIAGNOSIS — M54.50 CHRONIC BILATERAL LOW BACK PAIN WITHOUT SCIATICA: ICD-10-CM

## 2021-04-13 DIAGNOSIS — F17.200 TOBACCO USE DISORDER: ICD-10-CM

## 2021-04-13 DIAGNOSIS — J30.2 SEASONAL ALLERGIES: ICD-10-CM

## 2021-04-13 DIAGNOSIS — E78.2 MIXED HYPERLIPIDEMIA: Primary | ICD-10-CM

## 2021-04-13 DIAGNOSIS — F33.41 RECURRENT MAJOR DEPRESSIVE DISORDER, IN PARTIAL REMISSION (HCC): ICD-10-CM

## 2021-04-13 DIAGNOSIS — G89.29 CHRONIC BILATERAL LOW BACK PAIN WITHOUT SCIATICA: ICD-10-CM

## 2021-04-13 DIAGNOSIS — J43.1 PANLOBULAR EMPHYSEMA (HCC): ICD-10-CM

## 2021-04-13 PROCEDURE — 4004F PT TOBACCO SCREEN RCVD TLK: CPT | Performed by: FAMILY MEDICINE

## 2021-04-13 PROCEDURE — 3008F BODY MASS INDEX DOCD: CPT | Performed by: FAMILY MEDICINE

## 2021-04-13 PROCEDURE — 99213 OFFICE O/P EST LOW 20 MIN: CPT | Performed by: FAMILY MEDICINE

## 2021-04-13 RX ORDER — HYDROCODONE BITARTRATE AND ACETAMINOPHEN 7.5; 325 MG/1; MG/1
1 TABLET ORAL 3 TIMES DAILY
Qty: 30 TABLET | Refills: 0 | Status: SHIPPED | OUTPATIENT
Start: 2021-04-13 | End: 2021-05-11 | Stop reason: SDUPTHER

## 2021-04-13 RX ORDER — QUETIAPINE FUMARATE 100 MG/1
100 TABLET, FILM COATED ORAL
Qty: 90 TABLET | Refills: 1 | Status: SHIPPED | OUTPATIENT
Start: 2021-04-13 | End: 2021-06-10 | Stop reason: SDUPTHER

## 2021-04-13 RX ORDER — FLUTICASONE FUROATE, UMECLIDINIUM BROMIDE AND VILANTEROL TRIFENATATE 100; 62.5; 25 UG/1; UG/1; UG/1
1 POWDER RESPIRATORY (INHALATION) DAILY
Qty: 2 INHALER | Refills: 0 | Status: SHIPPED | COMMUNITY
Start: 2021-04-13 | End: 2021-05-11 | Stop reason: SDUPTHER

## 2021-04-13 NOTE — PROGRESS NOTES
Assessment/Plan: chronic pain syndrome with chronic bilateral low back pain without sciatica  Chronic neck pain  PDMP reviewed no concerns no evidence of abuse or divergence  Opioid therapy improves function    Chronic obstructive pulmonary disease with asthmatic component  The patient uses trilogy as a maintenance inhaler and albuterol for breakthrough wheezing  Patient continues with tobacco use disorder in spite of pulmonary issues the patient is not interested in stopping at this time    Bipolar disorder are Remeron effective therapy along with Seroquel    Hyperlipidemia laboratory is pending    Problem List Items Addressed This Visit     None      Visit Diagnoses     Recurrent major depressive disorder, in partial remission (HCC)        Relevant Medications    QUEtiapine (SEROquel) 100 mg tablet    Chronic pain syndrome        Relevant Medications    HYDROcodone-acetaminophen (Norco) 7 5-325 mg per tablet    Seasonal allergies        Relevant Medications    fluticasone-umeclidinium-vilanterol (Trelegy Ellipta) 100-62 5-25 MCG/INH inhaler           Diagnoses and all orders for this visit:    Recurrent major depressive disorder, in partial remission (HCC)  -     QUEtiapine (SEROquel) 100 mg tablet; Take 1 tablet (100 mg total) by mouth daily at bedtime    Chronic pain syndrome  -     HYDROcodone-acetaminophen (Norco) 7 5-325 mg per tablet; Take 1 tablet by mouth 3 (three) times a dayMax Daily Amount: 3 tablets    Seasonal allergies  -     fluticasone-umeclidinium-vilanterol (Trelegy Ellipta) 100-62 5-25 MCG/INH inhaler; Inhale 1 puff daily Rinse mouth after use  No problem-specific Assessment & Plan notes found for this encounter  PHQ-9 Depression Screening    PHQ-9:   Frequency of the following problems over the past two weeks: Body mass index is 26 34 kg/m²  BMI Counseling: Body mass index is 26 34 kg/m²  The BMI     Subjective:      Patient ID: Hedy Ram is a 53 y o  female  Patient presents for regular checkup with a chief complaint of neck and low back pain  The patient has COPD with an asthmatic component  Anxiety and depression      The following portions of the patient's history were reviewed and updated as appropriate:   She has a past medical history of Allergies, Anxiety, Asthma, Bereavement, Bipolar disorder ([facility] [address] ), Carpal tunnel syndrome, bilateral, Chronic bronchitis ([facility] [address] ), Chronic constipation, Chronic low back pain, Chronic neck pain, COPD (chronic obstructive pulmonary disease) ([facility] [address] ), Costochondritis, DDD (degenerative disc disease), cervical, Degenerative joint disease of left hip, Depression, Diverticulitis, DJD (degenerative joint disease) of knee, DJD (degenerative joint disease), ankle and foot, Edema, Fatigue, GABE (generalized anxiety disorder), GERD (gastroesophageal reflux disease), Hernia, inguinal, Hernia, umbilical, Hyperlipidemia, Hypertension, IBS (irritable bowel syndrome), Insomnia, Lower GI bleed, Migraine, Neuropathy, PTSD (post-traumatic stress disorder), Raynaud disease, Scalp psoriasis, Situational depression, and Tobacco dependence  ,  does not have any pertinent problems on file  ,   has a past surgical history that includes Chest tube insertion; DXA procedure(historical) (09/04/2009 & 8/24/2005); Mammo (historical) (02/14/2014); Hernia repair; and Cryotherapy  ,  family history includes Anxiety disorder in her other; Arthritis in her other; Cancer in her other; Depression in her other; Diabetes in her other; Heart disease in her other  ,   reports that she has been smoking  She has never used smokeless tobacco  She reports current alcohol use  She reports previous drug use ,  is allergic to erythromycin     Current Outpatient Medications   Medication Sig Dispense Refill    atorvastatin (LIPITOR) 10 mg tablet Take 1 tablet (10 mg total) by mouth daily 30 tablet 5    benzonatate (TESSALON) 200 MG capsule Take 1 capsule (200 mg total) by mouth 3 (three) times a day as needed for cough 30 capsule 2    calcium carbonate (OS-ONELIA) 600 MG tablet take 1 tablet by mouth twice a day with meals 180 tablet 1    diclofenac (VOLTAREN) 75 mg EC tablet Take 1 tablet (75 mg total) by mouth 2 (two) times a day 60 tablet 5    fluticasone-umeclidinium-vilanterol (Trelegy Ellipta) 100-62 5-25 MCG/INH inhaler Inhale 1 puff daily Rinse mouth after use  2 Inhaler 0    HYDROcodone-acetaminophen (Norco) 7 5-325 mg per tablet Take 1 tablet by mouth 3 (three) times a dayMax Daily Amount: 3 tablets 30 tablet 0    hydrOXYzine pamoate (VISTARIL) 50 mg capsule Take 1 capsule (50 mg total) by mouth 3 (three) times a day as needed for itching 30 capsule 5    loratadine (CLARITIN) 10 mg tablet Take 1 tablet (10 mg total) by mouth daily 30 tablet 5    methocarbamol (ROBAXIN) 750 mg tablet take 1 tablet by mouth four times a day 120 tablet 3    mirtazapine (REMERON) 45 MG tablet Take 1 tablet (45 mg total) by mouth daily at bedtime 30 tablet 2    Multiple Vitamins-Minerals (MULTIVITAMIN ADULT PO) Take by mouth      QUEtiapine (SEROquel) 100 mg tablet Take 1 tablet (100 mg total) by mouth daily at bedtime 90 tablet 1    RA Vitamin B-6 50 MG tablet take 1 tablet by mouth three times a day 90 tablet 5    topiramate (TOPAMAX) 50 MG tablet Take 50 mg by mouth 2 (two) times a day      TURMERIC PO Take 1 tablet by mouth       No current facility-administered medications for this visit  Review of Systems   Constitutional: Negative for chills and fever  HENT: Negative for ear pain and sore throat  Eyes: Negative for pain and visual disturbance  Respiratory: Positive for shortness of breath and wheezing  Negative for cough  Cardiovascular: Negative for chest pain and palpitations  Gastrointestinal: Negative for abdominal pain and vomiting  Genitourinary: Negative for dysuria and hematuria  Musculoskeletal: Positive for arthralgias, back pain and neck pain  Skin: Negative for color change and rash  Neurological: Negative for seizures and syncope  All other systems reviewed and are negative  Objective:    /74   Pulse 84   Temp 98 2 °F (36 8 °C)   Resp 20   Ht 5' 2" (1 575 m)   Wt 65 3 kg (144 lb)   BMI 26 34 kg/m²   Body mass index is 26 34 kg/m²  Physical Exam  Constitutional:       Appearance: She is well-developed  HENT:      Head: Normocephalic  Eyes:      Pupils: Pupils are equal, round, and reactive to light  Neck:      Musculoskeletal: Normal range of motion  Cardiovascular:      Rate and Rhythm: Normal rate and regular rhythm  Heart sounds: Normal heart sounds  Pulmonary:      Effort: Pulmonary effort is normal       Breath sounds: Normal breath sounds  Abdominal:      General: Bowel sounds are normal       Palpations: Abdomen is soft  Tenderness: There is no abdominal tenderness  Musculoskeletal:      Comments: Decreased range of motion of the cervical spine with multiple trigger points  Paraspinal spasm of the lumbar spine with SI tenderness on the left   Skin:     General: Skin is warm  Neurological:      Mental Status: She is alert and oriented to person, place, and time

## 2021-04-30 ENCOUNTER — IMMUNIZATIONS (OUTPATIENT)
Dept: FAMILY MEDICINE CLINIC | Facility: HOSPITAL | Age: 51
End: 2021-04-30

## 2021-04-30 DIAGNOSIS — Z23 ENCOUNTER FOR IMMUNIZATION: Primary | ICD-10-CM

## 2021-04-30 PROCEDURE — 91301 SARS-COV-2 / COVID-19 MRNA VACCINE (MODERNA) 100 MCG: CPT

## 2021-04-30 PROCEDURE — 0012A SARS-COV-2 / COVID-19 MRNA VACCINE (MODERNA) 100 MCG: CPT

## 2021-05-11 ENCOUNTER — OFFICE VISIT (OUTPATIENT)
Dept: FAMILY MEDICINE CLINIC | Facility: CLINIC | Age: 51
End: 2021-05-11
Payer: COMMERCIAL

## 2021-05-11 VITALS
DIASTOLIC BLOOD PRESSURE: 74 MMHG | TEMPERATURE: 97.8 F | HEIGHT: 62 IN | HEART RATE: 68 BPM | WEIGHT: 144 LBS | RESPIRATION RATE: 20 BRPM | SYSTOLIC BLOOD PRESSURE: 126 MMHG | BODY MASS INDEX: 26.5 KG/M2

## 2021-05-11 DIAGNOSIS — Z13.1 SCREENING FOR DIABETES MELLITUS: ICD-10-CM

## 2021-05-11 DIAGNOSIS — G89.29 CHRONIC BILATERAL LOW BACK PAIN WITHOUT SCIATICA: ICD-10-CM

## 2021-05-11 DIAGNOSIS — E55.9 VITAMIN D DEFICIENCY: ICD-10-CM

## 2021-05-11 DIAGNOSIS — Z00.00 ANNUAL PHYSICAL EXAM: ICD-10-CM

## 2021-05-11 DIAGNOSIS — E78.2 MIXED HYPERLIPIDEMIA: ICD-10-CM

## 2021-05-11 DIAGNOSIS — M54.2 CHRONIC NECK AND BACK PAIN: ICD-10-CM

## 2021-05-11 DIAGNOSIS — M54.50 CHRONIC BILATERAL LOW BACK PAIN WITHOUT SCIATICA: ICD-10-CM

## 2021-05-11 DIAGNOSIS — F17.200 TOBACCO USE DISORDER: ICD-10-CM

## 2021-05-11 DIAGNOSIS — Z11.59 NEED FOR HEPATITIS C SCREENING TEST: ICD-10-CM

## 2021-05-11 DIAGNOSIS — M54.9 CHRONIC NECK AND BACK PAIN: ICD-10-CM

## 2021-05-11 DIAGNOSIS — J30.2 SEASONAL ALLERGIES: ICD-10-CM

## 2021-05-11 DIAGNOSIS — Z11.3 SCREENING FOR STDS (SEXUALLY TRANSMITTED DISEASES): ICD-10-CM

## 2021-05-11 DIAGNOSIS — G89.4 CHRONIC PAIN SYNDROME: ICD-10-CM

## 2021-05-11 DIAGNOSIS — G89.29 CHRONIC NECK AND BACK PAIN: ICD-10-CM

## 2021-05-11 DIAGNOSIS — J44.9 CHRONIC OBSTRUCTIVE PULMONARY DISEASE, UNSPECIFIED COPD TYPE (HCC): Primary | ICD-10-CM

## 2021-05-11 DIAGNOSIS — Z11.4 SCREENING FOR HIV (HUMAN IMMUNODEFICIENCY VIRUS): ICD-10-CM

## 2021-05-11 DIAGNOSIS — F41.9 ANXIETY: ICD-10-CM

## 2021-05-11 PROCEDURE — 3725F SCREEN DEPRESSION PERFORMED: CPT | Performed by: FAMILY MEDICINE

## 2021-05-11 PROCEDURE — 99396 PREV VISIT EST AGE 40-64: CPT | Performed by: FAMILY MEDICINE

## 2021-05-11 PROCEDURE — 99213 OFFICE O/P EST LOW 20 MIN: CPT | Performed by: FAMILY MEDICINE

## 2021-05-11 RX ORDER — FLUTICASONE FUROATE, UMECLIDINIUM BROMIDE AND VILANTEROL TRIFENATATE 100; 62.5; 25 UG/1; UG/1; UG/1
1 POWDER RESPIRATORY (INHALATION) DAILY
Qty: 2 EACH | Refills: 0 | Status: SHIPPED | COMMUNITY
Start: 2021-05-11 | End: 2021-06-10 | Stop reason: SDUPTHER

## 2021-05-11 RX ORDER — HYDROCODONE BITARTRATE AND ACETAMINOPHEN 7.5; 325 MG/1; MG/1
1 TABLET ORAL 3 TIMES DAILY
Qty: 30 TABLET | Refills: 0 | Status: SHIPPED | OUTPATIENT
Start: 2021-05-11 | End: 2021-06-10 | Stop reason: SDUPTHER

## 2021-05-11 NOTE — PATIENT INSTRUCTIONS

## 2021-05-11 NOTE — PROGRESS NOTES
ADULT ANNUAL [id number] [address] PRIMARY CARE    NAME: Ruy Mccracken  AGE: 49 y o  SEX: female  : 1970     DATE: 2021     Assessment and Plan:Chronic neck and low back pain pain is treated with Norco   Norco improves function  PDMP reviewed no evidence of abuse or divergence  Molly Ke was used in conjunction with Voltaren  Chronic obstructive pulmonary disease in someone who still smokes trilogy has a maintenance inhaler uses albuterol daily  Not ready to stop smoking at this point    Seasonal allergies Claritin effective therapy    Chronic anxiety hydroxyzine 50 mg t i d  is affective therapy    Mixed hyperlipidemia laboratory is pending  Problem List Items Addressed This Visit        Other    Chronic bilateral low back pain without sciatica    Tobacco use disorder      Other Visit Diagnoses     Chronic obstructive pulmonary disease, unspecified COPD type ([facility] [address] )    -  Primary    Chronic pain syndrome        Chronic neck and back pain        Seasonal allergies        Anxiety              Immunizations and preventive care screenings were discussed with patient today  Appropriate education was printed on patient's after visit summary  Counseling:  · Alcohol/drug use: discussed moderation in alcohol intake, the recommendations for healthy alcohol use, and avoidance of illicit drug use  BMI Counseling: Body mass index is 26 34 kg/m²  The BMI is above normal  Nutrition recommendations include decreasing portion sizes, encouraging healthy choices of fruits and vegetables, decreasing fast food intake, consuming healthier snacks, limiting drinks that contain sugar, moderation in carbohydrate intake, increasing intake of lean protein, reducing intake of saturated and trans fat and reducing intake of cholesterol  Exercise recommendations include moderate physical activity 150 minutes/week and exercising 3-5 times per week   No pharmacotherapy was ordered  No follow-ups on file  Chief Complaint:     Chief Complaint   Patient presents with    Annual Exam     per insurance    Follow-up     monthly check up    Medication Refill     patient also requests trelogy inhaler samples      History of Present Illness:     Adult Annual Physical   Patient here for a comprehensive physical exam  The patient reports no problems  Diet and Physical Activity  · Diet/Nutrition: poor diet  · Exercise: walking  Depression Screening  PHQ-9 Depression Screening    PHQ-9:   Frequency of the following problems over the past two weeks:      Little interest or pleasure in doing things: 0 - not at all  Feeling down, depressed, or hopeless: 1 - several days  PHQ-2 Score: 1       General Health  · Sleep: sleeps well  · Hearing: normal - bilateral   · Vision: no vision problems  · Dental: no dental visits for >1 year  /GYN Health  · Patient is: perimenopausal  · Last menstrual [id number]  · Contraceptive method: injectable contraception  Review of Systems:     Review of Systems   Constitutional: Negative for chills and fever  HENT: Negative for ear pain and sore throat  Eyes: Negative for pain and visual disturbance  Respiratory: Negative for cough and shortness of breath  Cardiovascular: Negative for chest pain and palpitations  Gastrointestinal: Negative for abdominal pain and vomiting  Genitourinary: Negative for dysuria and hematuria  Musculoskeletal: Positive for arthralgias and back pain  Chronic neck and low back pain the pain is 6/7 out of 10   Skin: Negative for color change and rash  Neurological: Negative for seizures and syncope  All other systems reviewed and are negative       Past Medical History:     Past Medical History:   Diagnosis Date    Allergies     Anxiety     Asthma     Bereavement     Bipolar disorder ([facility] [address] )     Carpal tunnel syndrome, bilateral     Chronic bronchitis (HCC)     Chronic constipation     Chronic low back pain     Chronic neck pain     COPD (chronic obstructive pulmonary disease) (HCC)     Costochondritis     DDD (degenerative disc disease), cervical     Degenerative joint disease of left hip     Depression     Diverticulitis     DJD (degenerative joint disease) of knee     DJD (degenerative joint disease), ankle and foot     Edema     Fatigue     GABE (generalized anxiety disorder)     GERD (gastroesophageal reflux disease)     Hernia, inguinal     Hernia, umbilical     Hyperlipidemia     Hypertension     IBS (irritable bowel syndrome)     Insomnia     Lower GI bleed     Migraine     Neuropathy     PTSD (post-traumatic stress disorder)     Raynaud disease     Scalp psoriasis     Situational depression     Tobacco dependence       Past Surgical History:     Past Surgical History:   Procedure Laterality Date    CHEST TUBE INSERTION      CRYOTHERAPY      Plantar wart    DXA PROCEDURE (HISTORICAL)  09/04/2009 & 8/24/2005     [facility]    HERNIA REPAIR      Umbilical    MAMMO (HISTORICAL)  02/14/2014    [facility]      Social History:        Social History     Socioeconomic History    Marital status: Single     Spouse name: None    Number of children: None    Years of education: None    Highest education level: None   Occupational History    None   Social Needs    Financial resource strain: None    Food insecurity     Worry: None     Inability: None    Transportation needs     Medical: None     Non-medical: None   Tobacco Use    Smoking status: Current Every Day Smoker    Smokeless tobacco: Never Used   Substance and Sexual Activity    Alcohol use: Yes     Comment: Occasionally    Drug use: Not Currently    Sexual activity: None   Lifestyle    Physical activity     Days per week: None     Minutes per session: None    Stress: None   Relationships    Social connections     Talks on phone: None     Gets together: None     Attends Islam service: None     Active member of club or organization: None     Attends meetings of clubs or organizations: None     Relationship status: None    Intimate partner violence     Fear of current or ex partner: None     Emotionally abused: None     Physically abused: None     Forced sexual activity: None   Other Topics Concern    None   Social History Narrative    Alcohol: No    Per paper chart      Family History:     Family History   Problem Relation Age of Onset    Heart disease Other     Diabetes Other     Arthritis Other     Anxiety disorder Other     Depression Other     Cancer Other       Current Medications:     Current Outpatient Medications   Medication Sig Dispense Refill    atorvastatin (LIPITOR) 10 mg tablet Take 1 tablet (10 mg total) by mouth daily 30 tablet 5    benzonatate (TESSALON) 200 MG capsule Take 1 capsule (200 mg total) by mouth 3 (three) times a day as needed for cough 30 capsule 2    calcium carbonate (OS-ONELIA) 600 MG tablet take 1 tablet by mouth twice a day with meals 180 tablet 1    diclofenac (VOLTAREN) 75 mg EC tablet Take 1 tablet (75 mg total) by mouth 2 (two) times a day 60 tablet 5    fluticasone-umeclidinium-vilanterol (Trelegy Ellipta) 100-62 5-25 MCG/INH inhaler Inhale 1 puff daily Rinse mouth after use   2 Inhaler 0    HYDROcodone-acetaminophen (Norco) 7 5-325 mg per tablet Take 1 tablet by mouth 3 (three) times a dayMax Daily Amount: 3 tablets 30 tablet 0    hydrOXYzine pamoate (VISTARIL) 50 mg capsule Take 1 capsule (50 mg total) by mouth 3 (three) times a day as needed for itching 30 capsule 5    loratadine (CLARITIN) 10 mg tablet Take 1 tablet (10 mg total) by mouth daily 30 tablet 5    methocarbamol (ROBAXIN) 750 mg tablet take 1 tablet by mouth four times a day 120 tablet 3    mirtazapine (REMERON) 45 MG tablet Take 1 tablet (45 mg total) by mouth daily at bedtime 30 tablet 2    Multiple Vitamins-Minerals (MULTIVITAMIN ADULT PO) Take by mouth      QUEtiapine (SEROquel) 100 mg tablet Take 1 tablet (100 mg total) by mouth daily at bedtime 90 tablet 1    RA Vitamin B-6 50 MG tablet take 1 tablet by mouth three times a day 90 tablet 5    topiramate (TOPAMAX) 50 MG tablet Take 50 mg by mouth 2 (two) times a day      TURMERIC PO Take 1 tablet by mouth       No current facility-administered medications for this visit  Allergies: Allergies   Allergen Reactions    Erythromycin GI Intolerance      Physical Exam:     /74   Pulse 68   Temp 97 8 °F (36 6 °C)   Resp 20   Ht 5' 2" (1 575 m)   Wt 65 3 kg (144 lb)   BMI 26 34 kg/m²     Physical Exam  Vitals signs and nursing note reviewed  Constitutional:       General: She is not in acute distress  Appearance: She is well-developed  HENT:      Head: Normocephalic and atraumatic  Eyes:      Conjunctiva/sclera: Conjunctivae normal    Neck:      Musculoskeletal: Neck supple  Cardiovascular:      Rate and Rhythm: Normal rate and regular rhythm  Heart sounds: No murmur  Pulmonary:      Effort: Pulmonary effort is normal  No respiratory distress  Breath sounds: Rhonchi present  Abdominal:      Palpations: Abdomen is soft  Tenderness: There is no abdominal tenderness  Musculoskeletal:      Comments: Decreased range of motion of the cervical spine with paraspinal spasm and multiple trigger points  Lumbar paraspinal spasm with bilateral SI tenderness negative leg raising  Skin:     General: Skin is warm and dry  Neurological:      Mental Status: She is alert            Yahaira Kiser DO  [facility] PRIMARY CARE

## 2021-06-10 ENCOUNTER — OFFICE VISIT (OUTPATIENT)
Dept: FAMILY MEDICINE CLINIC | Facility: CLINIC | Age: 51
End: 2021-06-10
Payer: COMMERCIAL

## 2021-06-10 VITALS
TEMPERATURE: 97.6 F | BODY MASS INDEX: 25.4 KG/M2 | HEIGHT: 62 IN | DIASTOLIC BLOOD PRESSURE: 84 MMHG | SYSTOLIC BLOOD PRESSURE: 134 MMHG | HEART RATE: 68 BPM | RESPIRATION RATE: 20 BRPM | WEIGHT: 138 LBS

## 2021-06-10 DIAGNOSIS — M54.50 CHRONIC BILATERAL LOW BACK PAIN WITHOUT SCIATICA: ICD-10-CM

## 2021-06-10 DIAGNOSIS — S83.412A SPRAIN OF MEDIAL COLLATERAL LIGAMENT OF LEFT KNEE, INITIAL ENCOUNTER: ICD-10-CM

## 2021-06-10 DIAGNOSIS — E78.2 MIXED HYPERLIPIDEMIA: ICD-10-CM

## 2021-06-10 DIAGNOSIS — F17.200 TOBACCO USE DISORDER: ICD-10-CM

## 2021-06-10 DIAGNOSIS — G89.29 CHRONIC BILATERAL LOW BACK PAIN WITHOUT SCIATICA: ICD-10-CM

## 2021-06-10 DIAGNOSIS — J44.9 CHRONIC OBSTRUCTIVE PULMONARY DISEASE, UNSPECIFIED COPD TYPE (HCC): Primary | ICD-10-CM

## 2021-06-10 DIAGNOSIS — G89.4 CHRONIC PAIN SYNDROME: ICD-10-CM

## 2021-06-10 DIAGNOSIS — F33.41 RECURRENT MAJOR DEPRESSIVE DISORDER, IN PARTIAL REMISSION (HCC): ICD-10-CM

## 2021-06-10 DIAGNOSIS — Z88.9 HISTORY OF SEASONAL ALLERGIES: ICD-10-CM

## 2021-06-10 DIAGNOSIS — J30.2 SEASONAL ALLERGIES: ICD-10-CM

## 2021-06-10 PROCEDURE — 99214 OFFICE O/P EST MOD 30 MIN: CPT | Performed by: FAMILY MEDICINE

## 2021-06-10 PROCEDURE — 4004F PT TOBACCO SCREEN RCVD TLK: CPT | Performed by: FAMILY MEDICINE

## 2021-06-10 PROCEDURE — 3008F BODY MASS INDEX DOCD: CPT | Performed by: FAMILY MEDICINE

## 2021-06-10 RX ORDER — NICOTINE 21 MG/24HR
PATCH, TRANSDERMAL 24 HOURS TRANSDERMAL
Qty: 28 PATCH | Status: CANCELLED | OUTPATIENT
Start: 2021-06-10

## 2021-06-10 RX ORDER — ATORVASTATIN CALCIUM 10 MG/1
10 TABLET, FILM COATED ORAL DAILY
Qty: 30 TABLET | Refills: 5 | Status: SHIPPED | OUTPATIENT
Start: 2021-06-10 | End: 2021-12-13 | Stop reason: SDUPTHER

## 2021-06-10 RX ORDER — LORATADINE 10 MG/1
10 TABLET ORAL DAILY
Qty: 30 TABLET | Refills: 5 | Status: SHIPPED | OUTPATIENT
Start: 2021-06-10 | End: 2021-12-13 | Stop reason: SDUPTHER

## 2021-06-10 RX ORDER — QUETIAPINE FUMARATE 100 MG/1
100 TABLET, FILM COATED ORAL
Qty: 90 TABLET | Refills: 1 | Status: SHIPPED | OUTPATIENT
Start: 2021-06-10 | End: 2021-07-12 | Stop reason: SDUPTHER

## 2021-06-10 RX ORDER — FLUTICASONE FUROATE, UMECLIDINIUM BROMIDE AND VILANTEROL TRIFENATATE 100; 62.5; 25 UG/1; UG/1; UG/1
1 POWDER RESPIRATORY (INHALATION) DAILY
Qty: 2 EACH | Refills: 0 | Status: SHIPPED | COMMUNITY
Start: 2021-06-10 | End: 2021-07-12 | Stop reason: SDUPTHER

## 2021-06-10 RX ORDER — BENZONATATE 200 MG/1
200 CAPSULE ORAL 3 TIMES DAILY PRN
Qty: 30 CAPSULE | Refills: 2 | Status: SHIPPED | OUTPATIENT
Start: 2021-06-10 | End: 2021-09-09 | Stop reason: SDUPTHER

## 2021-06-10 RX ORDER — HYDROCODONE BITARTRATE AND ACETAMINOPHEN 7.5; 325 MG/1; MG/1
1 TABLET ORAL 3 TIMES DAILY
Qty: 30 TABLET | Refills: 0 | Status: SHIPPED | OUTPATIENT
Start: 2021-06-10 | End: 2021-07-12 | Stop reason: SDUPTHER

## 2021-06-10 RX ORDER — NICOTINE 21 MG/24HR
1 PATCH, TRANSDERMAL 24 HOURS TRANSDERMAL EVERY 24 HOURS
Qty: 28 PATCH | Refills: 0 | Status: SHIPPED | OUTPATIENT
Start: 2021-06-10 | End: 2021-06-18

## 2021-06-10 RX ORDER — METHYLPREDNISOLONE 4 MG/1
TABLET ORAL
Qty: 21 EACH | Refills: 0 | Status: SHIPPED | OUTPATIENT
Start: 2021-06-10 | End: 2022-01-11 | Stop reason: SDUPTHER

## 2021-06-10 RX ORDER — NICOTINE 21 MG/24HR
PATCH, TRANSDERMAL 24 HOURS TRANSDERMAL
COMMUNITY
Start: 2021-03-22 | End: 2021-06-18

## 2021-06-10 RX ORDER — DICLOFENAC SODIUM 75 MG/1
75 TABLET, DELAYED RELEASE ORAL 2 TIMES DAILY
Qty: 60 TABLET | Refills: 5 | Status: SHIPPED | OUTPATIENT
Start: 2021-06-10 | End: 2022-05-16 | Stop reason: SDUPTHER

## 2021-06-10 RX ORDER — MIRTAZAPINE 45 MG/1
45 TABLET, FILM COATED ORAL
Qty: 30 TABLET | Refills: 2 | Status: SHIPPED | OUTPATIENT
Start: 2021-06-10 | End: 2021-09-09 | Stop reason: SDUPTHER

## 2021-06-10 NOTE — PROGRESS NOTES
Assessment/Plan: chronic neck and low back pain, pain is treated with Norco   Norco improves function  PDMP reviewed no evidence of abuse or divergence  Norco was used in conjunction with Voltaren  Chronic obstructive pulmonary disease and some loose stool smokes trilogy is uses a maintenance inhaler reviewed her oral was used as a rescue inhaler    Chronic anxiety is treated with hydroxyzine    Primary insomnia treated with Seroquel with good effect    Depression treated with Remeron with good effect    Tobacco use disorder the patient is using a nicotine patch she states that when she used a nicotine patch she cuts down her tobacco intake to 1 pack she is not use in the patch she is up to 2 packs per day    Acute strain of the left medial collateral ligament will treat with a Medrol Dosepak    Problem List Items Addressed This Visit        Other    Chronic bilateral low back pain without sciatica    Mixed hyperlipidemia      Other Visit Diagnoses     Chronic obstructive pulmonary disease, unspecified COPD type (HCC)        Chronic pain syndrome        History of seasonal allergies        Recurrent major depressive disorder, in partial remission (HCC)        Relevant Medications    nicotine (NICODERM CQ) 21 mg/24 hr TD 24 hr patch    Seasonal allergies               Diagnoses and all orders for this visit:    Mixed hyperlipidemia  -     atorvastatin (LIPITOR) 10 mg tablet; Take 1 tablet (10 mg total) by mouth daily    Chronic obstructive pulmonary disease, unspecified COPD type ([address] )  -     benzonatate (TESSALON) 200 MG capsule; Take 1 capsule (200 mg total) by mouth 3 (three) times a day as needed for cough    Chronic bilateral low back pain without sciatica  -     diclofenac (VOLTAREN) 75 mg EC tablet; Take 1 tablet (75 mg total) by mouth 2 (two) times a day    Chronic pain syndrome  -     HYDROcodone-acetaminophen (Norco) 7 5-325 mg per tablet;  Take 1 tablet by mouth 3 (three) times a dayMax Daily Amount: 3 tablets    History of seasonal allergies  -     loratadine (CLARITIN) 10 mg tablet; Take 1 tablet (10 mg total) by mouth daily    Recurrent major depressive disorder, in partial remission (HCC)  -     mirtazapine (REMERON) 45 MG tablet; Take 1 tablet (45 mg total) by mouth daily at bedtime  -     QUEtiapine (SEROquel) 100 mg tablet; Take 1 tablet (100 mg total) by mouth daily at bedtime    Seasonal allergies  -     fluticasone-umeclidinium-vilanterol (Trelegy Ellipta) 100-62 5-25 MCG/INH inhaler; Inhale 1 puff daily Rinse mouth after use  Other orders  -     nicotine (NICODERM CQ) 21 mg/24 hr TD 24 hr patch; apply 1 patch to CLEAN, DRY, AND INTACT SKIN REMOVE AND REPLACE every 24 hours  -     Cancel: nicotine (NICODERM CQ) 21 mg/24 hr TD 24 hr patch        No problem-specific Assessment & Plan notes found for this encounter  PHQ-9 Depression Screening    PHQ-9:   Frequency of the following problems over the past two weeks: Body mass index is 25 24 kg/m²  BMI Counseling: Body mass index is 25 24 kg/m²  The BMI     Subjective:      Patient ID: Dillon Sanches is a 49 y o  female       Patient presents for monthly checkup      The following portions of the patient's history were reviewed and updated as appropriate:   She has a past medical history of Allergies, Anxiety, Asthma, Bereavement, Bipolar disorder ([address] ), Carpal tunnel syndrome, bilateral, Chronic bronchitis ([address] ), Chronic constipation, Chronic low back pain, Chronic neck pain, COPD (chronic obstructive pulmonary disease) ([address] ), Costochondritis, DDD (degenerative disc disease), cervical, Degenerative joint disease of left hip, Depression, Diverticulitis, DJD (degenerative joint disease) of knee, DJD (degenerative joint disease), ankle and foot, Edema, Fatigue, GABE (generalized anxiety disorder), GERD (gastroesophageal reflux disease), Hernia, inguinal, Hernia, umbilical, Hyperlipidemia, Hypertension, IBS (irritable bowel syndrome), Insomnia, Lower GI bleed, Migraine, Neuropathy, PTSD (post-traumatic stress disorder), Raynaud disease, Scalp psoriasis, Situational depression, and Tobacco dependence  ,  does not have any pertinent problems on file  ,   has a past surgical history that includes Chest tube insertion; DXA procedure(historical) (09/04/2009 & 8/24/2005); Mammo (historical) (02/14/2014); Hernia repair; and Cryotherapy  ,  family history includes Anxiety disorder in her other; Arthritis in her other; Cancer in her other; Depression in her other; Diabetes in her other; Heart disease in her other  ,   reports that she has been smoking  She has never used smokeless tobacco  She reports current alcohol use  She reports previous drug use ,  is allergic to erythromycin     Current Outpatient Medications   Medication Sig Dispense Refill    atorvastatin (LIPITOR) 10 mg tablet Take 1 tablet (10 mg total) by mouth daily 30 tablet 5    benzonatate (TESSALON) 200 MG capsule Take 1 capsule (200 mg total) by mouth 3 (three) times a day as needed for cough 30 capsule 2    calcium carbonate (OS-ONELIA) 600 MG tablet take 1 tablet by mouth twice a day with meals 180 tablet 1    diclofenac (VOLTAREN) 75 mg EC tablet Take 1 tablet (75 mg total) by mouth 2 (two) times a day 60 tablet 5    fluticasone-umeclidinium-vilanterol (Trelegy Ellipta) 100-62 5-25 MCG/INH inhaler Inhale 1 puff daily Rinse mouth after use   2 each 0    HYDROcodone-acetaminophen (Norco) 7 5-325 mg per tablet Take 1 tablet by mouth 3 (three) times a dayMax Daily Amount: 3 tablets 30 tablet 0    hydrOXYzine pamoate (VISTARIL) 50 mg capsule Take 1 capsule (50 mg total) by mouth 3 (three) times a day as needed for itching 30 capsule 5    loratadine (CLARITIN) 10 mg tablet Take 1 tablet (10 mg total) by mouth daily 30 tablet 5    methocarbamol (ROBAXIN) 750 mg tablet take 1 tablet by mouth four times a day 120 tablet 3    mirtazapine (REMERON) 45 MG tablet Take 1 tablet (45 mg total) by mouth daily at bedtime 30 tablet 2    Multiple Vitamins-Minerals (MULTIVITAMIN ADULT PO) Take by mouth      nicotine (NICODERM CQ) 21 mg/24 hr TD 24 hr patch apply 1 patch to CLEAN, DRY, AND INTACT SKIN REMOVE AND REPLACE every 24 hours      QUEtiapine (SEROquel) 100 mg tablet Take 1 tablet (100 mg total) by mouth daily at bedtime 90 tablet 1    RA Vitamin B-6 50 MG tablet take 1 tablet by mouth three times a day 90 tablet 5    topiramate (TOPAMAX) 50 MG tablet Take 50 mg by mouth 2 (two) times a day      TURMERIC PO Take 1 tablet by mouth       No current facility-administered medications for this visit  Review of Systems   Constitutional: Negative for chills and fever  HENT: Negative for ear pain and sore throat  Eyes: Negative for pain and visual disturbance  Respiratory: Positive for cough, shortness of breath and wheezing  Cardiovascular: Negative for chest pain and palpitations  Gastrointestinal: Negative for abdominal pain and vomiting  Genitourinary: Negative for dysuria and hematuria  Musculoskeletal: Positive for arthralgias, back pain, myalgias and neck pain  Complaint of left knee pain   Skin: Negative for color change and rash  Neurological: Negative for seizures and syncope  All other systems reviewed and are negative  Objective:    /84   Pulse 68   Temp 97 6 °F (36 4 °C)   Resp 20   Ht 5' 2" (1 575 m)   Wt 62 6 kg (138 lb)   BMI 25 24 kg/m²   Body mass index is 25 24 kg/m²  Physical Exam  Constitutional:       Appearance: She is well-developed  HENT:      Head: Normocephalic  Eyes:      Pupils: Pupils are equal, round, and reactive to light  Neck:      Musculoskeletal: Normal range of motion  Cardiovascular:      Rate and Rhythm: Normal rate and regular rhythm  Heart sounds: Normal heart sounds  Pulmonary:      Effort: Pulmonary effort is normal       Breath sounds: Rhonchi present     Abdominal:      General: Bowel sounds are normal       Palpations: Abdomen is soft  Tenderness: There is no abdominal tenderness  Musculoskeletal:      Comments: Left medial collateral ligament is tenderness  Decreased range of motion of the cervical spine  Lumbar paraspinal spasm with bilateral SI tenderness   Skin:     General: Skin is warm  Neurological:      Mental Status: She is alert and oriented to person, place, and time

## 2021-06-18 DIAGNOSIS — F17.200 TOBACCO USE DISORDER: Primary | ICD-10-CM

## 2021-06-18 RX ORDER — NICOTINE 21 MG/24HR
PATCH, TRANSDERMAL 24 HOURS TRANSDERMAL
Qty: 28 PATCH | Refills: 11 | Status: SHIPPED | OUTPATIENT
Start: 2021-06-18 | End: 2022-01-11 | Stop reason: SDUPTHER

## 2021-07-12 ENCOUNTER — OFFICE VISIT (OUTPATIENT)
Dept: FAMILY MEDICINE CLINIC | Facility: CLINIC | Age: 51
End: 2021-07-12
Payer: COMMERCIAL

## 2021-07-12 VITALS
RESPIRATION RATE: 16 BRPM | HEIGHT: 62 IN | TEMPERATURE: 97.6 F | WEIGHT: 138 LBS | HEART RATE: 68 BPM | SYSTOLIC BLOOD PRESSURE: 118 MMHG | BODY MASS INDEX: 25.4 KG/M2 | DIASTOLIC BLOOD PRESSURE: 64 MMHG

## 2021-07-12 DIAGNOSIS — M54.9 CHRONIC NECK AND BACK PAIN: ICD-10-CM

## 2021-07-12 DIAGNOSIS — J30.2 SEASONAL ALLERGIES: ICD-10-CM

## 2021-07-12 DIAGNOSIS — F33.41 RECURRENT MAJOR DEPRESSIVE DISORDER, IN PARTIAL REMISSION (HCC): ICD-10-CM

## 2021-07-12 DIAGNOSIS — M54.2 CHRONIC NECK AND BACK PAIN: ICD-10-CM

## 2021-07-12 DIAGNOSIS — G89.4 CHRONIC PAIN SYNDROME: ICD-10-CM

## 2021-07-12 DIAGNOSIS — G89.29 CHRONIC BILATERAL LOW BACK PAIN WITHOUT SCIATICA: Primary | ICD-10-CM

## 2021-07-12 DIAGNOSIS — J44.9 CHRONIC OBSTRUCTIVE PULMONARY DISEASE, UNSPECIFIED COPD TYPE (HCC): ICD-10-CM

## 2021-07-12 DIAGNOSIS — M54.50 CHRONIC BILATERAL LOW BACK PAIN WITHOUT SCIATICA: Primary | ICD-10-CM

## 2021-07-12 DIAGNOSIS — G89.29 CHRONIC NECK AND BACK PAIN: ICD-10-CM

## 2021-07-12 DIAGNOSIS — F17.200 TOBACCO USE DISORDER: ICD-10-CM

## 2021-07-12 PROCEDURE — 4004F PT TOBACCO SCREEN RCVD TLK: CPT | Performed by: FAMILY MEDICINE

## 2021-07-12 PROCEDURE — 99213 OFFICE O/P EST LOW 20 MIN: CPT | Performed by: FAMILY MEDICINE

## 2021-07-12 PROCEDURE — 3008F BODY MASS INDEX DOCD: CPT | Performed by: FAMILY MEDICINE

## 2021-07-12 RX ORDER — QUETIAPINE FUMARATE 100 MG/1
100 TABLET, FILM COATED ORAL
Qty: 90 TABLET | Refills: 1 | Status: SHIPPED | OUTPATIENT
Start: 2021-07-12 | End: 2022-01-11 | Stop reason: SDUPTHER

## 2021-07-12 RX ORDER — METHOCARBAMOL 750 MG/1
750 TABLET, FILM COATED ORAL 4 TIMES DAILY
Qty: 120 TABLET | Refills: 3 | Status: SHIPPED | OUTPATIENT
Start: 2021-07-12 | End: 2021-08-11

## 2021-07-12 RX ORDER — FLUTICASONE FUROATE, UMECLIDINIUM BROMIDE AND VILANTEROL TRIFENATATE 100; 62.5; 25 UG/1; UG/1; UG/1
1 POWDER RESPIRATORY (INHALATION) DAILY
Qty: 1 EACH | Refills: 0 | Status: SHIPPED | COMMUNITY
Start: 2021-07-12 | End: 2021-09-09 | Stop reason: ALTCHOICE

## 2021-07-12 RX ORDER — HYDROCODONE BITARTRATE AND ACETAMINOPHEN 7.5; 325 MG/1; MG/1
1 TABLET ORAL 3 TIMES DAILY
Qty: 90 TABLET | Refills: 0 | Status: SHIPPED | OUTPATIENT
Start: 2021-07-12 | End: 2021-08-10 | Stop reason: SDUPTHER

## 2021-07-12 RX ORDER — FLUTICASONE FUROATE, UMECLIDINIUM BROMIDE AND VILANTEROL TRIFENATATE 200; 62.5; 25 UG/1; UG/1; UG/1
1 POWDER RESPIRATORY (INHALATION) DAILY
Qty: 1 EACH | Refills: 0 | Status: SHIPPED | COMMUNITY
Start: 2021-07-12 | End: 2021-08-10 | Stop reason: SDUPTHER

## 2021-07-12 NOTE — PROGRESS NOTES
Assessment/Plan: chronic neck and low back pain treated with Maryclare Fairly improves function PDMP has been reviewed no evidence of abuse or divergence  No Norco is used in conjunction with Voltaren    Chronic obstructive pulmonary disease uses trilogy as a maintenance inhaler and albuterol as a rescue inhaler  Has panlobular emphysema but continues to smoke    Depression successfully treated with Seroquel    Anxiety treated with Vistaril    Seasonal allergies treated with Claritin    The patient was reminded of laboratory is in the system and that she should fast for 10 hours prior to obtaining lab    Tobacco use disorder the patient is not willing to stop at this point    Problem List Items Addressed This Visit        Other    Chronic bilateral low back pain without sciatica      Other Visit Diagnoses     Recurrent major depressive disorder, in partial remission ([facility] [address] )        Chronic pain syndrome        Seasonal allergies               Diagnoses and all orders for this visit:    Chronic bilateral low back pain without sciatica  -     methocarbamol (ROBAXIN) 750 mg tablet; Take 1 tablet (750 mg total) by mouth 4 (four) times a day    Recurrent major depressive disorder, in partial remission ([facility])  -     QUEtiapine (SEROquel) 100 mg tablet; Take 1 tablet (100 mg total) by mouth daily at bedtime    Chronic pain syndrome  -     HYDROcodone-acetaminophen (Norco) 7 5-325 mg per tablet; Take 1 tablet by mouth 3 (three) times a dayMax Daily Amount: 3 tablets    Seasonal allergies        No problem-specific Assessment & Plan notes found for this encounter  PHQ-9 Depression Screening    PHQ-9:   Frequency of the following problems over the past two weeks: Body mass index is 25 24 kg/m²  BMI Counseling: Body mass index is 25 24 kg/m²  The BMI   Subjective:      Patient ID: Demetrius Santos is a 49 y o  female      HPI    The following portions of the patient's history were reviewed and updated as appropriate:   She has a past medical history of Allergies, Anxiety, Asthma, Bereavement, Bipolar disorder ([facility] [address] ), Carpal tunnel syndrome, bilateral, Chronic bronchitis ([facility] [address] ), Chronic constipation, Chronic low back pain, Chronic neck pain, COPD (chronic obstructive pulmonary disease) ([facility] [address] ), Costochondritis, DDD (degenerative disc disease), cervical, Degenerative joint disease of left hip, Depression, Diverticulitis, DJD (degenerative joint disease) of knee, DJD (degenerative joint disease), ankle and foot, Edema, Fatigue, GABE (generalized anxiety disorder), GERD (gastroesophageal reflux disease), Hernia, inguinal, Hernia, umbilical, Hyperlipidemia, Hypertension, IBS (irritable bowel syndrome), Insomnia, Lower GI bleed, Migraine, Neuropathy, PTSD (post-traumatic stress disorder), Raynaud disease, Scalp psoriasis, Situational depression, and Tobacco dependence  ,  does not have any pertinent problems on file  ,   has a past surgical history that includes Chest tube insertion; DXA procedure(historical) (09/04/2009 & 8/24/2005); Mammo (historical) (02/14/2014); Hernia repair; and Cryotherapy  ,  family history includes Anxiety disorder in her other; Arthritis in her other; Cancer in her other; Depression in her other; Diabetes in her other; Heart disease in her other  ,   reports that she has been smoking  She has never used smokeless tobacco  She reports current alcohol use  She reports previous drug use ,  is allergic to erythromycin     Current Outpatient Medications   Medication Sig Dispense Refill    atorvastatin (LIPITOR) 10 mg tablet Take 1 tablet (10 mg total) by mouth daily 30 tablet 5    benzonatate (TESSALON) 200 MG capsule Take 1 capsule (200 mg total) by mouth 3 (three) times a day as needed for cough 30 capsule 2    calcium carbonate (OS-ONELIA) 600 MG tablet take 1 tablet by mouth twice a day with meals 180 tablet 1    diclofenac (VOLTAREN) 75 mg EC tablet Take 1 tablet (75 mg total) by mouth 2 (two) times a day 60 tablet 5    fluticasone-umeclidinium-vilanterol (Trelegy Ellipta) 100-62 5-25 MCG/INH inhaler Inhale 1 puff daily Rinse mouth after use  2 each 0    HYDROcodone-acetaminophen (Norco) 7 5-325 mg per tablet Take 1 tablet by mouth 3 (three) times a dayMax Daily Amount: 3 tablets 30 tablet 0    hydrOXYzine pamoate (VISTARIL) 50 mg capsule Take 1 capsule (50 mg total) by mouth 3 (three) times a day as needed for itching 30 capsule 5    loratadine (CLARITIN) 10 mg tablet Take 1 tablet (10 mg total) by mouth daily 30 tablet 5    methocarbamol (ROBAXIN) 750 mg tablet take 1 tablet by mouth four times a day 120 tablet 3    methylPREDNISolone 4 MG tablet therapy pack Use as directed on package (Patient not taking: Reported on 7/12/2021) 21 each 0    mirtazapine (REMERON) 45 MG tablet Take 1 tablet (45 mg total) by mouth daily at bedtime 30 tablet 2    Multiple Vitamins-Minerals (MULTIVITAMIN ADULT PO) Take by mouth      nicotine (NICODERM CQ) 21 mg/24 hr TD 24 hr patch apply 1 patch to CLEAN, DRY, AND INTACT SKIN REMOVE AND REPLACE every 24 hours 28 patch 11    QUEtiapine (SEROquel) 100 mg tablet Take 1 tablet (100 mg total) by mouth daily at bedtime 90 tablet 1    RA Vitamin B-6 50 MG tablet take 1 tablet by mouth three times a day 90 tablet 5    topiramate (TOPAMAX) 50 MG tablet Take 50 mg by mouth 2 (two) times a day      TURMERIC PO Take 1 tablet by mouth       No current facility-administered medications for this visit  Review of Systems   Constitutional: Negative for chills and fever  HENT: Negative for ear pain and sore throat  Eyes: Negative for pain and visual disturbance  Respiratory: Positive for shortness of breath and wheezing  Negative for cough  Cardiovascular: Negative for chest pain and palpitations  Gastrointestinal: Negative for abdominal pain and vomiting  Genitourinary: Negative for dysuria and hematuria     Musculoskeletal: Positive for back pain, gait problem and neck pain  Negative for arthralgias  Chronic neck and back pain back pain 6/7 out of 10   Skin: Negative for color change and rash  Neurological: Negative for seizures and syncope  All other systems reviewed and are negative  Objective:    /64   Pulse 68   Temp 97 6 °F (36 4 °C)   Resp 16   Ht 5' 2" (1 575 m)   Wt 62 6 kg (138 lb)   BMI 25 24 kg/m²   Body mass index is 25 24 kg/m²  Physical Exam  Constitutional:       Appearance: She is well-developed  HENT:      Head: Normocephalic  Eyes:      Pupils: Pupils are equal, round, and reactive to light  Cardiovascular:      Rate and Rhythm: Normal rate and regular rhythm  Heart sounds: Normal heart sounds  Pulmonary:      Effort: Pulmonary effort is normal       Breath sounds: Rhonchi present  Abdominal:      General: Bowel sounds are normal       Palpations: Abdomen is soft  Tenderness: There is no abdominal tenderness  Musculoskeletal:      Cervical back: Normal range of motion  Comments: Decreased range of motion of the cervical spine with paraspinal tenderness  Paraspinal spasm of the lumbar spine bilateral SI tenderness with the right being greater than the left negative leg raise   Skin:     General: Skin is warm  Neurological:      Mental Status: She is alert and oriented to person, place, and time  Psychiatric:         Mood and Affect: Mood normal          Behavior: Behavior normal          Thought Content:  Thought content normal          Judgment: Judgment normal

## 2021-08-10 ENCOUNTER — OFFICE VISIT (OUTPATIENT)
Dept: FAMILY MEDICINE CLINIC | Facility: CLINIC | Age: 51
End: 2021-08-10
Payer: COMMERCIAL

## 2021-08-10 VITALS
HEART RATE: 84 BPM | DIASTOLIC BLOOD PRESSURE: 74 MMHG | HEIGHT: 62 IN | TEMPERATURE: 97.3 F | WEIGHT: 136 LBS | BODY MASS INDEX: 25.03 KG/M2 | RESPIRATION RATE: 20 BRPM | SYSTOLIC BLOOD PRESSURE: 126 MMHG

## 2021-08-10 DIAGNOSIS — Z79.891 OPIOID USE AGREEMENT EXISTS: ICD-10-CM

## 2021-08-10 DIAGNOSIS — M54.2 CHRONIC NECK AND BACK PAIN: Primary | ICD-10-CM

## 2021-08-10 DIAGNOSIS — M54.9 CHRONIC NECK AND BACK PAIN: Primary | ICD-10-CM

## 2021-08-10 DIAGNOSIS — J44.9 CHRONIC OBSTRUCTIVE PULMONARY DISEASE, UNSPECIFIED COPD TYPE (HCC): ICD-10-CM

## 2021-08-10 DIAGNOSIS — F17.200 TOBACCO USE DISORDER: ICD-10-CM

## 2021-08-10 DIAGNOSIS — G89.29 CHRONIC NECK AND BACK PAIN: Primary | ICD-10-CM

## 2021-08-10 DIAGNOSIS — M54.50 CHRONIC BILATERAL LOW BACK PAIN WITHOUT SCIATICA: ICD-10-CM

## 2021-08-10 DIAGNOSIS — G89.29 CHRONIC BILATERAL LOW BACK PAIN WITHOUT SCIATICA: ICD-10-CM

## 2021-08-10 DIAGNOSIS — G89.4 CHRONIC PAIN SYNDROME: ICD-10-CM

## 2021-08-10 DIAGNOSIS — F41.9 ANXIETY: ICD-10-CM

## 2021-08-10 PROCEDURE — 3008F BODY MASS INDEX DOCD: CPT | Performed by: FAMILY MEDICINE

## 2021-08-10 PROCEDURE — 99213 OFFICE O/P EST LOW 20 MIN: CPT | Performed by: FAMILY MEDICINE

## 2021-08-10 PROCEDURE — 4004F PT TOBACCO SCREEN RCVD TLK: CPT | Performed by: FAMILY MEDICINE

## 2021-08-10 RX ORDER — FLUTICASONE FUROATE, UMECLIDINIUM BROMIDE AND VILANTEROL TRIFENATATE 200; 62.5; 25 UG/1; UG/1; UG/1
1 POWDER RESPIRATORY (INHALATION) DAILY
Qty: 1 EACH | Refills: 0 | Status: SHIPPED | COMMUNITY
Start: 2021-08-10 | End: 2021-09-09 | Stop reason: SDUPTHER

## 2021-08-10 RX ORDER — HYDROCODONE BITARTRATE AND ACETAMINOPHEN 7.5; 325 MG/1; MG/1
1 TABLET ORAL 3 TIMES DAILY
Qty: 90 TABLET | Refills: 0 | Status: SHIPPED | OUTPATIENT
Start: 2021-08-10 | End: 2021-09-07 | Stop reason: SDUPTHER

## 2021-08-10 NOTE — PROGRESS NOTES
Assessment/Plan: chronic neck and low back pain  The pain is treated with Eliezer Starch improves function  PDMP reviewed no evidence of abuse or divergence Norco was used in conjunction with Voltaren  Chronic obstructive pulmonary disease trilogy is uses the maintenance inhaler she frequently uses a rescue inhaler  Nicotine dependence when the patient was the nicotine patch she smokes 1 pack per day when she does not with the nicotine patch she smokes 2 packs per day    Chronic anxiety treated with hydroxyzine    Primary insomnia treated with Seroquel    Depression treated with Remeron    Problem List Items Addressed This Visit        Other    Opioid use agreement exists - Primary      Other Visit Diagnoses     Chronic pain syndrome        Chronic obstructive pulmonary disease, unspecified COPD type ([place] [address] )               Diagnoses and all orders for this visit:    Opioid use agreement exists  -     Toxicology screen, urine  -     Hydrocodone and Metabolites, Urine    Chronic pain syndrome  -     HYDROcodone-acetaminophen (Norco) 7 5-325 mg per tablet; Take 1 tablet by mouth 3 (three) times a dayMax Daily Amount: 3 tablets    Chronic obstructive pulmonary disease, unspecified COPD type ([facility] )  -     fluticasone-umeclidinium-vilanterol (Trelegy Ellipta) 200-62 5-25 MCG/INH AEPB inhaler; Inhale 1 puff daily Rinse mouth after use  No problem-specific Assessment & Plan notes found for this encounter  PHQ-9 Depression Screening    PHQ-9:   Frequency of the following problems over the past two weeks: Body mass index is 24 87 kg/m²  BMI Counseling: Body mass index is 24 87 kg/m²  The BMI     Subjective:      Patient ID: Valarie Gill is a 49 y o  female       Patient presents for 1 month checkup on her chronic neck and back pain, COPD anxiety and depression      The following portions of the patient's history were reviewed and updated as appropriate:   She has a past medical history of Allergies, Anxiety, Asthma, Bereavement, Bipolar disorder ([facility] [address] ), Carpal tunnel syndrome, bilateral, Chronic bronchitis ([facility] [address] ), Chronic constipation, Chronic low back pain, Chronic neck pain, COPD (chronic obstructive pulmonary disease) ([facility] [address] ), Costochondritis, DDD (degenerative disc disease), cervical, Degenerative joint disease of left hip, Depression, Diverticulitis, DJD (degenerative joint disease) of knee, DJD (degenerative joint disease), ankle and foot, Edema, Fatigue, GABE (generalized anxiety disorder), GERD (gastroesophageal reflux disease), Hernia, inguinal, Hernia, umbilical, Hyperlipidemia, Hypertension, IBS (irritable bowel syndrome), Insomnia, Lower GI bleed, Migraine, Neuropathy, PTSD (post-traumatic stress disorder), Raynaud disease, Scalp psoriasis, Situational depression, and Tobacco dependence  ,  does not have any pertinent problems on file  ,   has a past surgical history that includes Chest tube insertion; DXA procedure(historical) (09/04/2009 & 8/24/2005); Mammo (historical) (02/14/2014); Hernia repair; and Cryotherapy  ,  family history includes Anxiety disorder in her other; Arthritis in her other; Cancer in her other; Depression in her other; Diabetes in her other; Heart disease in her other  ,   reports that she has been smoking  She has never used smokeless tobacco  She reports current alcohol use  She reports previous drug use ,  is allergic to erythromycin     Current Outpatient Medications   Medication Sig Dispense Refill    atorvastatin (LIPITOR) 10 mg tablet Take 1 tablet (10 mg total) by mouth daily 30 tablet 5    benzonatate (TESSALON) 200 MG capsule Take 1 capsule (200 mg total) by mouth 3 (three) times a day as needed for cough 30 capsule 2    calcium carbonate (OS-ONELIA) 600 MG tablet take 1 tablet by mouth twice a day with meals 180 tablet 1    diclofenac (VOLTAREN) 75 mg EC tablet Take 1 tablet (75 mg total) by mouth 2 (two) times a day 60 tablet 5    fluticasone-umeclidinium-vilanterol (Trelegy Ellipta) 100-62 5-25 MCG/INH inhaler Inhale 1 puff daily Rinse mouth after use  1 each 0    fluticasone-umeclidinium-vilanterol (Trelegy Ellipta) 200-62 5-25 MCG/INH AEPB inhaler Inhale 1 puff daily Rinse mouth after use  1 each 0    HYDROcodone-acetaminophen (Norco) 7 5-325 mg per tablet Take 1 tablet by mouth 3 (three) times a dayMax Daily Amount: 3 tablets 90 tablet 0    hydrOXYzine pamoate (VISTARIL) 50 mg capsule Take 1 capsule (50 mg total) by mouth 3 (three) times a day as needed for itching 30 capsule 5    loratadine (CLARITIN) 10 mg tablet Take 1 tablet (10 mg total) by mouth daily 30 tablet 5    methocarbamol (ROBAXIN) 750 mg tablet Take 1 tablet (750 mg total) by mouth 4 (four) times a day 120 tablet 3    methylPREDNISolone 4 MG tablet therapy pack Use as directed on package (Patient not taking: Reported on 7/12/2021) 21 each 0    mirtazapine (REMERON) 45 MG tablet Take 1 tablet (45 mg total) by mouth daily at bedtime 30 tablet 2    Multiple Vitamins-Minerals (MULTIVITAMIN ADULT PO) Take by mouth      nicotine (NICODERM CQ) 21 mg/24 hr TD 24 hr patch apply 1 patch to CLEAN, DRY, AND INTACT SKIN REMOVE AND REPLACE every 24 hours 28 patch 11    QUEtiapine (SEROquel) 100 mg tablet Take 1 tablet (100 mg total) by mouth daily at bedtime 90 tablet 1    RA Vitamin B-6 50 MG tablet take 1 tablet by mouth three times a day 90 tablet 5    topiramate (TOPAMAX) 50 MG tablet Take 50 mg by mouth 2 (two) times a day      TURMERIC PO Take 1 tablet by mouth       No current facility-administered medications for this visit  Review of Systems   Constitutional: Negative for chills and fever  HENT: Negative for ear pain and sore throat  Eyes: Negative for pain and visual disturbance  Respiratory: Positive for cough, shortness of breath and wheezing  Cardiovascular: Negative for chest pain and palpitations     Gastrointestinal: Negative for abdominal pain and vomiting  Genitourinary: Negative for dysuria and hematuria  Musculoskeletal: Negative for arthralgias and back pain  Skin: Negative for color change and rash  Neurological: Negative for seizures and syncope  Psychiatric/Behavioral: Positive for dysphoric mood  The patient is nervous/anxious  All other systems reviewed and are negative  Objective:    /74   Pulse 84   Temp (!) 97 3 °F (36 3 °C)   Resp 20   Ht 5' 2" (1 575 m)   Wt 61 7 kg (136 lb)   BMI 24 87 kg/m²   Body mass index is 24 87 kg/m²  Physical Exam  Constitutional:       Appearance: She is well-developed  HENT:      Head: Normocephalic  Eyes:      Pupils: Pupils are equal, round, and reactive to light  Cardiovascular:      Rate and Rhythm: Normal rate and regular rhythm  Heart sounds: Normal heart sounds  Pulmonary:      Effort: Pulmonary effort is normal       Breath sounds: Wheezing and rhonchi present  Abdominal:      General: Bowel sounds are normal       Palpations: Abdomen is soft  Tenderness: There is no abdominal tenderness  Musculoskeletal:      Cervical back: Normal range of motion  Comments: Decreased range of motion of the cervical spine with paraspinal tenderness  Paraspinal lumbar spasm with bilateral SI tenderness   Skin:     General: Skin is warm  Neurological:      Mental Status: She is alert and oriented to person, place, and time

## 2021-09-07 ENCOUNTER — APPOINTMENT (OUTPATIENT)
Dept: LAB | Facility: HOSPITAL | Age: 51
End: 2021-09-07
Attending: FAMILY MEDICINE
Payer: COMMERCIAL

## 2021-09-07 ENCOUNTER — TELEPHONE (OUTPATIENT)
Dept: FAMILY MEDICINE CLINIC | Facility: CLINIC | Age: 51
End: 2021-09-07

## 2021-09-07 DIAGNOSIS — G89.4 CHRONIC PAIN SYNDROME: ICD-10-CM

## 2021-09-07 PROCEDURE — 80361 OPIATES 1 OR MORE: CPT | Performed by: FAMILY MEDICINE

## 2021-09-07 RX ORDER — HYDROCODONE BITARTRATE AND ACETAMINOPHEN 7.5; 325 MG/1; MG/1
1 TABLET ORAL 3 TIMES DAILY
Qty: 90 TABLET | Refills: 0 | Status: SHIPPED | OUTPATIENT
Start: 2021-09-07 | End: 2021-10-08 | Stop reason: SDUPTHER

## 2021-09-07 NOTE — TELEPHONE ENCOUNTER
Sent to [facility] in [place] because the [address] was on her pharmacy list

## 2021-09-07 NOTE — TELEPHONE ENCOUNTER
Patient has an appt on Thursday [facility] and was wondering if you could send her a script to [facility] so she can pick it up a day earlier although she still will come to her appt Thursday

## 2021-09-09 ENCOUNTER — OFFICE VISIT (OUTPATIENT)
Dept: FAMILY MEDICINE CLINIC | Facility: CLINIC | Age: 51
End: 2021-09-09
Payer: COMMERCIAL

## 2021-09-09 VITALS
WEIGHT: 134 LBS | HEIGHT: 62 IN | BODY MASS INDEX: 24.66 KG/M2 | DIASTOLIC BLOOD PRESSURE: 68 MMHG | HEART RATE: 84 BPM | TEMPERATURE: 97.6 F | RESPIRATION RATE: 20 BRPM | SYSTOLIC BLOOD PRESSURE: 124 MMHG

## 2021-09-09 DIAGNOSIS — F17.200 TOBACCO USE DISORDER: Primary | ICD-10-CM

## 2021-09-09 DIAGNOSIS — M54.9 CHRONIC NECK AND BACK PAIN: ICD-10-CM

## 2021-09-09 DIAGNOSIS — G89.29 CHRONIC BILATERAL LOW BACK PAIN WITHOUT SCIATICA: ICD-10-CM

## 2021-09-09 DIAGNOSIS — F33.41 RECURRENT MAJOR DEPRESSIVE DISORDER, IN PARTIAL REMISSION (HCC): ICD-10-CM

## 2021-09-09 DIAGNOSIS — J44.9 CHRONIC OBSTRUCTIVE PULMONARY DISEASE, UNSPECIFIED COPD TYPE (HCC): ICD-10-CM

## 2021-09-09 DIAGNOSIS — M54.2 CHRONIC NECK AND BACK PAIN: ICD-10-CM

## 2021-09-09 DIAGNOSIS — M54.50 CHRONIC BILATERAL LOW BACK PAIN WITHOUT SCIATICA: ICD-10-CM

## 2021-09-09 DIAGNOSIS — F41.9 ANXIETY: ICD-10-CM

## 2021-09-09 DIAGNOSIS — G89.29 CHRONIC NECK AND BACK PAIN: ICD-10-CM

## 2021-09-09 PROCEDURE — 99213 OFFICE O/P EST LOW 20 MIN: CPT | Performed by: FAMILY MEDICINE

## 2021-09-09 RX ORDER — MIRTAZAPINE 45 MG/1
45 TABLET, FILM COATED ORAL
Qty: 30 TABLET | Refills: 2 | Status: SHIPPED | OUTPATIENT
Start: 2021-09-09 | End: 2021-12-13 | Stop reason: SDUPTHER

## 2021-09-09 RX ORDER — BENZONATATE 200 MG/1
200 CAPSULE ORAL 3 TIMES DAILY PRN
Qty: 30 CAPSULE | Refills: 2 | Status: SHIPPED | OUTPATIENT
Start: 2021-09-09 | End: 2022-01-11 | Stop reason: SDUPTHER

## 2021-09-09 RX ORDER — PHENOL 1.4 %
600 AEROSOL, SPRAY (ML) MUCOUS MEMBRANE 2 TIMES DAILY WITH MEALS
Qty: 180 TABLET | Refills: 1 | Status: SHIPPED | OUTPATIENT
Start: 2021-09-09 | End: 2021-12-13 | Stop reason: SDUPTHER

## 2021-09-09 RX ORDER — FLUTICASONE FUROATE, UMECLIDINIUM BROMIDE AND VILANTEROL TRIFENATATE 200; 62.5; 25 UG/1; UG/1; UG/1
1 POWDER RESPIRATORY (INHALATION) DAILY
Qty: 2 EACH | Refills: 0 | Status: SHIPPED | COMMUNITY
Start: 2021-09-09 | End: 2021-10-08 | Stop reason: SDUPTHER

## 2021-09-09 RX ORDER — HYDROXYZINE PAMOATE 50 MG/1
50 CAPSULE ORAL 3 TIMES DAILY PRN
Qty: 30 CAPSULE | Refills: 5 | Status: SHIPPED | OUTPATIENT
Start: 2021-09-09 | End: 2022-02-11 | Stop reason: SDUPTHER

## 2021-09-09 NOTE — PROGRESS NOTES
Assessment/Plan: chronic neck and low back pain  The patient is treated with Gunjan Roblero improves function  PDMP reviewed no evidence of abuse or divergence norco is used in conjunction with Voltaren 75 b i d  Chronic obstructive pulmonary disease trilogy is used as a maintenance inhaler she frequently uses a rescue inhaler    Nicotine dependence patient currently smoking 2 packs per day    Chronic anxiety treated with hydroxyzine    Primary insomnia treated with Seroquel    Depression treated with Remeron    Problem List Items Addressed This Visit        Other    Chronic bilateral low back pain without sciatica      Other Visit Diagnoses     Recurrent major depressive disorder, in partial remission ([facility] [address] )        Chronic obstructive pulmonary disease, unspecified COPD type ([facility] )        Anxiety               Diagnoses and all orders for this visit:    Chronic bilateral low back pain without sciatica  -     calcium carbonate (OS-ONELIA) 600 MG tablet; Take 1 tablet (600 mg total) by mouth 2 (two) times a day with meals    Recurrent major depressive disorder, in partial remission ([facility])  -     mirtazapine (REMERON) 45 MG tablet; Take 1 tablet (45 mg total) by mouth daily at bedtime    Chronic obstructive pulmonary disease, unspecified COPD type ([facility] [address] )  -     benzonatate (TESSALON) 200 MG capsule; Take 1 capsule (200 mg total) by mouth 3 (three) times a day as needed for cough  -     fluticasone-umeclidinium-vilanterol (Trelegy Ellipta) 200-62 5-25 MCG/INH AEPB inhaler; Inhale 1 puff daily Rinse mouth after use  Anxiety  -     hydrOXYzine pamoate (VISTARIL) 50 mg capsule; Take 1 capsule (50 mg total) by mouth 3 (three) times a day as needed for itching        No problem-specific Assessment & Plan notes found for this encounter  PHQ-9 Depression Screening    PHQ-9:   Frequency of the following problems over the past two weeks: Body mass index is 24 51 kg/m²  BMI Counseling:  Body mass index is 24 51 kg/m²  The BMI   Subjective:      Patient ID: Deedee Spencer is a 49 y o  female  Patient presents for one-month follow-up for chronic neck and back pain and chronic obstructive pulmonary disease      The following portions of the patient's history were reviewed and updated as appropriate:   She has a past medical history of Allergies, Anxiety, Asthma, Bereavement, Bipolar disorder ([address] ), Carpal tunnel syndrome, bilateral, Chronic bronchitis ([address] ), Chronic constipation, Chronic low back pain, Chronic neck pain, COPD (chronic obstructive pulmonary disease) ([address] ), Costochondritis, DDD (degenerative disc disease), cervical, Degenerative joint disease of left hip, Depression, Diverticulitis, DJD (degenerative joint disease) of knee, DJD (degenerative joint disease), ankle and foot, Edema, Fatigue, GABE (generalized anxiety disorder), GERD (gastroesophageal reflux disease), Hernia, inguinal, Hernia, umbilical, Hyperlipidemia, Hypertension, IBS (irritable bowel syndrome), Insomnia, Lower GI bleed, Migraine, Neuropathy, PTSD (post-traumatic stress disorder), Raynaud disease, Scalp psoriasis, Situational depression, and Tobacco dependence  ,  does not have any pertinent problems on file  ,   has a past surgical history that includes Chest tube insertion; DXA procedure(historical) (09/04/2009 & 8/24/2005); Mammo (historical) (02/14/2014); Hernia repair; and Cryotherapy  ,  family history includes Anxiety disorder in her other; Arthritis in her other; Cancer in her other; Depression in her other; Diabetes in her other; Heart disease in her other  ,   reports that she has been smoking  She has never used smokeless tobacco  She reports current alcohol use  She reports previous drug use ,  is allergic to erythromycin     Current Outpatient Medications   Medication Sig Dispense Refill    atorvastatin (LIPITOR) 10 mg tablet Take 1 tablet (10 mg total) by mouth daily 30 tablet 5    benzonatate (TESSALON) 200 MG capsule Take 1 capsule (200 mg total) by mouth 3 (three) times a day as needed for cough 30 capsule 2    calcium carbonate (OS-ONELIA) 600 MG tablet take 1 tablet by mouth twice a day with meals 180 tablet 1    diclofenac (VOLTAREN) 75 mg EC tablet Take 1 tablet (75 mg total) by mouth 2 (two) times a day 60 tablet 5    fluticasone-umeclidinium-vilanterol (Trelegy Ellipta) 200-62 5-25 MCG/INH AEPB inhaler Inhale 1 puff daily Rinse mouth after use  1 each 0    HYDROcodone-acetaminophen (Norco) 7 5-325 mg per tablet Take 1 tablet by mouth 3 (three) times a dayMax Daily Amount: 3 tablets 90 tablet 0    hydrOXYzine pamoate (VISTARIL) 50 mg capsule Take 1 capsule (50 mg total) by mouth 3 (three) times a day as needed for itching 30 capsule 5    loratadine (CLARITIN) 10 mg tablet Take 1 tablet (10 mg total) by mouth daily 30 tablet 5    methocarbamol (ROBAXIN) 750 mg tablet Take 1 tablet (750 mg total) by mouth 4 (four) times a day 120 tablet 3    methylPREDNISolone 4 MG tablet therapy pack Use as directed on package (Patient not taking: Reported on 7/12/2021) 21 each 0    mirtazapine (REMERON) 45 MG tablet Take 1 tablet (45 mg total) by mouth daily at bedtime 30 tablet 2    Multiple Vitamins-Minerals (MULTIVITAMIN ADULT PO) Take by mouth      nicotine (NICODERM CQ) 21 mg/24 hr TD 24 hr patch apply 1 patch to CLEAN, DRY, AND INTACT SKIN REMOVE AND REPLACE every 24 hours 28 patch 11    QUEtiapine (SEROquel) 100 mg tablet Take 1 tablet (100 mg total) by mouth daily at bedtime 90 tablet 1    RA Vitamin B-6 50 MG tablet take 1 tablet by mouth three times a day 90 tablet 5    topiramate (TOPAMAX) 50 MG tablet Take 50 mg by mouth 2 (two) times a day      TURMERIC PO Take 1 tablet by mouth       No current facility-administered medications for this visit  Review of Systems   Constitutional: Negative for chills and fever  HENT: Negative for ear pain and sore throat  Eyes: Negative for pain and visual disturbance  Respiratory: Positive for cough, shortness of breath and wheezing  Cardiovascular: Negative for chest pain and palpitations  Gastrointestinal: Negative for abdominal pain and vomiting  Genitourinary: Negative for dysuria and hematuria  Musculoskeletal: Positive for back pain and neck pain  Negative for arthralgias  Skin: Negative for color change and rash  Neurological: Negative for seizures and syncope  All other systems reviewed and are negative  Objective:    /68   Pulse 84   Temp 97 6 °F (36 4 °C)   Resp 20   Ht 5' 2" (1 575 m)   Wt 60 8 kg (134 lb)   BMI 24 51 kg/m²   Body mass index is 24 51 kg/m²  Physical Exam  Constitutional:       Appearance: She is well-developed  HENT:      Head: Normocephalic  Eyes:      Pupils: Pupils are equal, round, and reactive to light  Cardiovascular:      Rate and Rhythm: Normal rate and regular rhythm  Heart sounds: Normal heart sounds  Pulmonary:      Effort: Pulmonary effort is normal       Breath sounds: Rhonchi present  Abdominal:      General: Bowel sounds are normal       Palpations: Abdomen is soft  Tenderness: There is no abdominal tenderness  Musculoskeletal:      Cervical back: Normal range of motion  Comments: Decreased  range motion of the cervical spine with multiple trigger points  Bilateral lumbar paraspinal spasm with bilateral SI tenderness   Skin:     General: Skin is warm  Neurological:      Mental Status: She is alert and oriented to person, place, and time

## 2021-09-18 LAB
HYDROCODONE UR QL: 7672 NG/ML
HYDROMORPHONE UR QL: 2630 NG/ML

## 2021-10-08 ENCOUNTER — OFFICE VISIT (OUTPATIENT)
Dept: FAMILY MEDICINE CLINIC | Facility: CLINIC | Age: 51
End: 2021-10-08
Payer: COMMERCIAL

## 2021-10-08 VITALS
TEMPERATURE: 98 F | RESPIRATION RATE: 20 BRPM | SYSTOLIC BLOOD PRESSURE: 126 MMHG | DIASTOLIC BLOOD PRESSURE: 74 MMHG | HEART RATE: 84 BPM | BODY MASS INDEX: 23.74 KG/M2 | WEIGHT: 129 LBS | HEIGHT: 62 IN

## 2021-10-08 DIAGNOSIS — J44.9 CHRONIC OBSTRUCTIVE PULMONARY DISEASE, UNSPECIFIED COPD TYPE (HCC): ICD-10-CM

## 2021-10-08 DIAGNOSIS — G89.29 CHRONIC NECK AND BACK PAIN: ICD-10-CM

## 2021-10-08 DIAGNOSIS — G89.4 CHRONIC PAIN SYNDROME: ICD-10-CM

## 2021-10-08 DIAGNOSIS — M54.2 CHRONIC NECK AND BACK PAIN: ICD-10-CM

## 2021-10-08 DIAGNOSIS — M54.50 CHRONIC BILATERAL LOW BACK PAIN WITHOUT SCIATICA: ICD-10-CM

## 2021-10-08 DIAGNOSIS — G89.29 CHRONIC BILATERAL LOW BACK PAIN WITHOUT SCIATICA: ICD-10-CM

## 2021-10-08 DIAGNOSIS — M54.9 CHRONIC NECK AND BACK PAIN: ICD-10-CM

## 2021-10-08 DIAGNOSIS — F17.200 TOBACCO USE DISORDER: Primary | ICD-10-CM

## 2021-10-08 PROCEDURE — 99213 OFFICE O/P EST LOW 20 MIN: CPT | Performed by: FAMILY MEDICINE

## 2021-10-08 RX ORDER — POLYETHYLENE GLYCOL 3350 17 G
4 POWDER IN PACKET (EA) ORAL AS NEEDED
Qty: 100 EACH | Refills: 0 | Status: SHIPPED | OUTPATIENT
Start: 2021-10-08 | End: 2022-01-11 | Stop reason: CLARIF

## 2021-10-08 RX ORDER — FLUTICASONE FUROATE, UMECLIDINIUM BROMIDE AND VILANTEROL TRIFENATATE 200; 62.5; 25 UG/1; UG/1; UG/1
1 POWDER RESPIRATORY (INHALATION) DAILY
Qty: 2 EACH | Refills: 0 | Status: SHIPPED | COMMUNITY
Start: 2021-10-08 | End: 2022-02-11 | Stop reason: SDUPTHER

## 2021-10-08 RX ORDER — HYDROCODONE BITARTRATE AND ACETAMINOPHEN 7.5; 325 MG/1; MG/1
1 TABLET ORAL 3 TIMES DAILY
Qty: 90 TABLET | Refills: 0 | Status: SHIPPED | OUTPATIENT
Start: 2021-10-08 | End: 2021-10-13 | Stop reason: CLARIF

## 2021-10-13 ENCOUNTER — NURSE TRIAGE (OUTPATIENT)
Dept: OTHER | Facility: OTHER | Age: 51
End: 2021-10-13

## 2021-10-13 DIAGNOSIS — G89.4 CHRONIC PAIN SYNDROME: ICD-10-CM

## 2021-10-13 RX ORDER — HYDROCODONE BITARTRATE AND ACETAMINOPHEN 7.5; 325 MG/1; MG/1
1 TABLET ORAL 3 TIMES DAILY
Qty: 90 TABLET | Refills: 0 | Status: CANCELLED | OUTPATIENT
Start: 2021-10-13

## 2021-10-14 DIAGNOSIS — G89.29 CHRONIC NECK AND BACK PAIN: Primary | ICD-10-CM

## 2021-10-14 DIAGNOSIS — M54.9 CHRONIC NECK AND BACK PAIN: Primary | ICD-10-CM

## 2021-10-14 DIAGNOSIS — M54.2 CHRONIC NECK AND BACK PAIN: Primary | ICD-10-CM

## 2021-10-14 RX ORDER — HYDROCODONE BITARTRATE AND ACETAMINOPHEN 7.5; 3 MG/1; MG/1
1 TABLET ORAL EVERY 8 HOURS PRN
Qty: 90 TABLET | Refills: 0 | Status: SHIPPED | OUTPATIENT
Start: 2021-10-14 | End: 2021-10-14 | Stop reason: DRUGHIGH

## 2021-10-14 RX ORDER — HYDROCODONE BITARTRATE AND ACETAMINOPHEN 7.5; 325 MG/1; MG/1
1 TABLET ORAL EVERY 6 HOURS PRN
Qty: 90 TABLET | Refills: 0 | Status: SHIPPED | OUTPATIENT
Start: 2021-10-14 | End: 2021-11-05 | Stop reason: SDUPTHER

## 2021-11-05 ENCOUNTER — OFFICE VISIT (OUTPATIENT)
Dept: FAMILY MEDICINE CLINIC | Facility: CLINIC | Age: 51
End: 2021-11-05
Payer: COMMERCIAL

## 2021-11-05 VITALS
HEIGHT: 62 IN | TEMPERATURE: 96.2 F | RESPIRATION RATE: 20 BRPM | HEART RATE: 84 BPM | WEIGHT: 134 LBS | BODY MASS INDEX: 24.66 KG/M2 | SYSTOLIC BLOOD PRESSURE: 124 MMHG | DIASTOLIC BLOOD PRESSURE: 68 MMHG

## 2021-11-05 DIAGNOSIS — G89.4 CHRONIC PAIN SYNDROME: ICD-10-CM

## 2021-11-05 DIAGNOSIS — J40 BRONCHITIS: Primary | ICD-10-CM

## 2021-11-05 DIAGNOSIS — M54.2 CHRONIC NECK AND BACK PAIN: ICD-10-CM

## 2021-11-05 DIAGNOSIS — M54.9 CHRONIC NECK AND BACK PAIN: ICD-10-CM

## 2021-11-05 DIAGNOSIS — J44.9 CHRONIC OBSTRUCTIVE PULMONARY DISEASE, UNSPECIFIED COPD TYPE (HCC): ICD-10-CM

## 2021-11-05 DIAGNOSIS — F17.200 TOBACCO USE DISORDER: ICD-10-CM

## 2021-11-05 DIAGNOSIS — G89.29 CHRONIC NECK AND BACK PAIN: ICD-10-CM

## 2021-11-05 PROCEDURE — 99213 OFFICE O/P EST LOW 20 MIN: CPT | Performed by: FAMILY MEDICINE

## 2021-11-05 RX ORDER — FLUTICASONE FUROATE, UMECLIDINIUM BROMIDE AND VILANTEROL TRIFENATATE 100; 62.5; 25 UG/1; UG/1; UG/1
1 POWDER RESPIRATORY (INHALATION) DAILY
Qty: 3 EACH | Refills: 0 | Status: SHIPPED | COMMUNITY
Start: 2021-11-05 | End: 2021-12-13 | Stop reason: SDUPTHER

## 2021-11-05 RX ORDER — METHYLPREDNISOLONE 4 MG/1
TABLET ORAL
Qty: 21 EACH | Refills: 0 | Status: SHIPPED | OUTPATIENT
Start: 2021-11-05 | End: 2022-01-11 | Stop reason: ALTCHOICE

## 2021-11-05 RX ORDER — HYDROCODONE BITARTRATE AND ACETAMINOPHEN 7.5; 325 MG/1; MG/1
1 TABLET ORAL EVERY 6 HOURS PRN
Qty: 90 TABLET | Refills: 0 | Status: SHIPPED | OUTPATIENT
Start: 2021-11-05 | End: 2021-12-13 | Stop reason: SDUPTHER

## 2021-11-05 RX ORDER — AZITHROMYCIN 250 MG/1
TABLET, FILM COATED ORAL
Qty: 6 TABLET | Refills: 0 | Status: SHIPPED | OUTPATIENT
Start: 2021-11-05 | End: 2021-11-10

## 2021-11-05 RX ORDER — METHYLPREDNISOLONE 4 MG/1
TABLET ORAL
Qty: 21 EACH | Refills: 0 | Status: SHIPPED | OUTPATIENT
Start: 2021-11-05 | End: 2021-11-05

## 2021-12-06 DIAGNOSIS — G62.9 NEUROPATHY: ICD-10-CM

## 2021-12-06 RX ORDER — LANOLIN ALCOHOL/MO/W.PET/CERES
CREAM (GRAM) TOPICAL
Qty: 90 TABLET | Refills: 5 | Status: SHIPPED | OUTPATIENT
Start: 2021-12-06 | End: 2021-12-13 | Stop reason: SDUPTHER

## 2021-12-13 ENCOUNTER — OFFICE VISIT (OUTPATIENT)
Dept: FAMILY MEDICINE CLINIC | Facility: CLINIC | Age: 51
End: 2021-12-13
Payer: COMMERCIAL

## 2021-12-13 VITALS
SYSTOLIC BLOOD PRESSURE: 124 MMHG | RESPIRATION RATE: 16 BRPM | HEART RATE: 64 BPM | TEMPERATURE: 97.8 F | DIASTOLIC BLOOD PRESSURE: 68 MMHG | HEIGHT: 62 IN | BODY MASS INDEX: 23.92 KG/M2 | WEIGHT: 130 LBS

## 2021-12-13 DIAGNOSIS — J40 BRONCHITIS: ICD-10-CM

## 2021-12-13 DIAGNOSIS — Z88.9 HISTORY OF SEASONAL ALLERGIES: ICD-10-CM

## 2021-12-13 DIAGNOSIS — G62.9 NEUROPATHY: ICD-10-CM

## 2021-12-13 DIAGNOSIS — F33.41 RECURRENT MAJOR DEPRESSIVE DISORDER, IN PARTIAL REMISSION (HCC): ICD-10-CM

## 2021-12-13 DIAGNOSIS — F41.9 ANXIETY: ICD-10-CM

## 2021-12-13 DIAGNOSIS — E78.2 MIXED HYPERLIPIDEMIA: ICD-10-CM

## 2021-12-13 DIAGNOSIS — M54.50 CHRONIC BILATERAL LOW BACK PAIN WITHOUT SCIATICA: ICD-10-CM

## 2021-12-13 DIAGNOSIS — M54.2 CHRONIC NECK AND BACK PAIN: ICD-10-CM

## 2021-12-13 DIAGNOSIS — M54.9 CHRONIC NECK AND BACK PAIN: ICD-10-CM

## 2021-12-13 DIAGNOSIS — G89.29 CHRONIC BILATERAL LOW BACK PAIN WITHOUT SCIATICA: ICD-10-CM

## 2021-12-13 DIAGNOSIS — G89.29 CHRONIC NECK AND BACK PAIN: ICD-10-CM

## 2021-12-13 PROCEDURE — 99213 OFFICE O/P EST LOW 20 MIN: CPT | Performed by: FAMILY MEDICINE

## 2021-12-13 RX ORDER — PHENOL 1.4 %
600 AEROSOL, SPRAY (ML) MUCOUS MEMBRANE 2 TIMES DAILY WITH MEALS
Qty: 180 TABLET | Refills: 1 | Status: SHIPPED | OUTPATIENT
Start: 2021-12-13 | End: 2022-03-11 | Stop reason: SDUPTHER

## 2021-12-13 RX ORDER — HYDROCODONE BITARTRATE AND ACETAMINOPHEN 7.5; 325 MG/1; MG/1
1 TABLET ORAL EVERY 6 HOURS PRN
Qty: 90 TABLET | Refills: 0 | Status: SHIPPED | OUTPATIENT
Start: 2021-12-13 | End: 2022-01-11 | Stop reason: SDUPTHER

## 2021-12-13 RX ORDER — FLUTICASONE FUROATE, UMECLIDINIUM BROMIDE AND VILANTEROL TRIFENATATE 100; 62.5; 25 UG/1; UG/1; UG/1
1 POWDER RESPIRATORY (INHALATION) DAILY
Qty: 1 EACH | Refills: 0 | Status: SHIPPED | COMMUNITY
Start: 2021-12-13 | End: 2022-01-11 | Stop reason: SDUPTHER

## 2021-12-13 RX ORDER — MIRTAZAPINE 45 MG/1
45 TABLET, FILM COATED ORAL
Qty: 30 TABLET | Refills: 2 | Status: SHIPPED | OUTPATIENT
Start: 2021-12-13 | End: 2022-02-11 | Stop reason: SDUPTHER

## 2021-12-13 RX ORDER — AZITHROMYCIN 250 MG/1
TABLET, FILM COATED ORAL
Qty: 6 TABLET | Refills: 0 | Status: SHIPPED | OUTPATIENT
Start: 2021-12-13 | End: 2021-12-18

## 2021-12-13 RX ORDER — METHYLPREDNISOLONE 4 MG/1
TABLET ORAL
Qty: 21 EACH | Refills: 0 | Status: SHIPPED | OUTPATIENT
Start: 2021-12-13 | End: 2022-01-11 | Stop reason: ALTCHOICE

## 2021-12-13 RX ORDER — LORATADINE 10 MG/1
10 TABLET ORAL DAILY
Qty: 90 TABLET | Refills: 1 | Status: SHIPPED | OUTPATIENT
Start: 2021-12-13 | End: 2022-06-10

## 2021-12-13 RX ORDER — ATORVASTATIN CALCIUM 10 MG/1
10 TABLET, FILM COATED ORAL DAILY
Qty: 90 TABLET | Refills: 1 | Status: SHIPPED | OUTPATIENT
Start: 2021-12-13 | End: 2022-05-31

## 2021-12-13 RX ORDER — LANOLIN ALCOHOL/MO/W.PET/CERES
50 CREAM (GRAM) TOPICAL 3 TIMES DAILY
Qty: 90 TABLET | Refills: 5 | Status: SHIPPED | OUTPATIENT
Start: 2021-12-13

## 2022-01-11 ENCOUNTER — OFFICE VISIT (OUTPATIENT)
Dept: FAMILY MEDICINE CLINIC | Facility: CLINIC | Age: 52
End: 2022-01-11
Payer: COMMERCIAL

## 2022-01-11 VITALS
SYSTOLIC BLOOD PRESSURE: 126 MMHG | DIASTOLIC BLOOD PRESSURE: 74 MMHG | BODY MASS INDEX: 24.48 KG/M2 | HEART RATE: 68 BPM | HEIGHT: 62 IN | TEMPERATURE: 97.4 F | RESPIRATION RATE: 20 BRPM | WEIGHT: 133 LBS

## 2022-01-11 DIAGNOSIS — M54.9 CHRONIC NECK AND BACK PAIN: Primary | ICD-10-CM

## 2022-01-11 DIAGNOSIS — G89.29 CHRONIC NECK AND BACK PAIN: Primary | ICD-10-CM

## 2022-01-11 DIAGNOSIS — M54.2 CHRONIC NECK AND BACK PAIN: Primary | ICD-10-CM

## 2022-01-11 DIAGNOSIS — F33.41 RECURRENT MAJOR DEPRESSIVE DISORDER, IN PARTIAL REMISSION (HCC): ICD-10-CM

## 2022-01-11 DIAGNOSIS — J44.9 CHRONIC OBSTRUCTIVE PULMONARY DISEASE, UNSPECIFIED COPD TYPE (HCC): ICD-10-CM

## 2022-01-11 DIAGNOSIS — F41.9 ANXIETY: ICD-10-CM

## 2022-01-11 DIAGNOSIS — F17.200 TOBACCO USE DISORDER: ICD-10-CM

## 2022-01-11 DIAGNOSIS — F31.70 BIPOLAR DISORDER IN FULL REMISSION, MOST RECENT EPISODE UNSPECIFIED TYPE (HCC): ICD-10-CM

## 2022-01-11 DIAGNOSIS — J40 BRONCHITIS: ICD-10-CM

## 2022-01-11 PROCEDURE — 99213 OFFICE O/P EST LOW 20 MIN: CPT | Performed by: FAMILY MEDICINE

## 2022-01-11 RX ORDER — BENZONATATE 200 MG/1
200 CAPSULE ORAL 3 TIMES DAILY PRN
Qty: 30 CAPSULE | Refills: 2 | Status: SHIPPED | OUTPATIENT
Start: 2022-01-11 | End: 2022-02-11 | Stop reason: SDUPTHER

## 2022-01-11 RX ORDER — FLUTICASONE FUROATE, UMECLIDINIUM BROMIDE AND VILANTEROL TRIFENATATE 100; 62.5; 25 UG/1; UG/1; UG/1
1 POWDER RESPIRATORY (INHALATION) DAILY
Qty: 1 EACH | Refills: 0 | Status: SHIPPED | COMMUNITY
Start: 2022-01-11 | End: 2022-06-14 | Stop reason: DRUGHIGH

## 2022-01-11 RX ORDER — NICOTINE 21 MG/24HR
1 PATCH, TRANSDERMAL 24 HOURS TRANSDERMAL EVERY 24 HOURS
Qty: 28 PATCH | Refills: 11 | Status: SHIPPED | OUTPATIENT
Start: 2022-01-11

## 2022-01-11 RX ORDER — QUETIAPINE FUMARATE 100 MG/1
100 TABLET, FILM COATED ORAL
Qty: 90 TABLET | Refills: 1 | Status: SHIPPED | OUTPATIENT
Start: 2022-01-11 | End: 2022-07-20

## 2022-01-11 RX ORDER — HYDROCODONE BITARTRATE AND ACETAMINOPHEN 7.5; 325 MG/1; MG/1
1 TABLET ORAL EVERY 6 HOURS PRN
Qty: 90 TABLET | Refills: 0 | Status: SHIPPED | OUTPATIENT
Start: 2022-01-11 | End: 2022-02-11 | Stop reason: SDUPTHER

## 2022-01-11 NOTE — PROGRESS NOTES
Assessment/Plan:  Chronic neck and back pain treated with nonsteroidal anti-inflammatory and opioid therapy as well as muscle relaxant the PDMP has been reviewed no issues found no evidence of divergence or abuse hydrocodone improves function    Chronic obstructive pulmonary disease and chronic bronchitis with asthmatic component patient uses trilogy as a maintenance inhaler infrequently uses the rescue inhaler    Nicotine dependence currently smoking 2 packs per day states that the nicotine patch did help reduce the amount of cigarettes she smokes per day and would like to try the patch again  Chronic anxiety treated with high hydroxyzine    Primary insomnia treated with Seroquel    Depression treated with Remeron    Bipolar disorder in full remission    Problem List Items Addressed This Visit        Other    Tobacco use disorder      Other Visit Diagnoses     Chronic neck and back pain        Chronic obstructive pulmonary disease, unspecified COPD type ([address] )        Recurrent major depressive disorder, in partial remission ([address] )        Bronchitis               Diagnoses and all orders for this visit:    Chronic neck and back pain    Chronic obstructive pulmonary disease, unspecified COPD type ([address] )    Recurrent major depressive disorder, in partial remission ([address] )    Bronchitis    Tobacco use disorder        No problem-specific Assessment & Plan notes found for this encounter  PHQ-2/9 Depression Screening            Body mass index is 24 33 kg/m²  BMI Counseling: Body mass index is 24 33 kg/m²  The BMI     Subjective:      Patient ID: Vale Moya is a 49 y o  female      HPI    The following portions of the patient's history were reviewed and updated as appropriate:   She has a past medical history of Allergies, Anxiety, Asthma, Bereavement, Bipolar disorder ([address] ), Carpal tunnel syndrome, bilateral, Chronic bronchitis ([address] ), Chronic constipation, Chronic low back pain, Chronic neck pain, COPD (chronic obstructive pulmonary disease) (HCC), Costochondritis, DDD (degenerative disc disease), cervical, Degenerative joint disease of left hip, Depression, Diverticulitis, DJD (degenerative joint disease) of knee, DJD (degenerative joint disease), ankle and foot, Edema, Fatigue, GABE (generalized anxiety disorder), GERD (gastroesophageal reflux disease), Hernia, inguinal, Hernia, umbilical, Hyperlipidemia, Hypertension, IBS (irritable bowel syndrome), Insomnia, Lower GI bleed, Migraine, Neuropathy, PTSD (post-traumatic stress disorder), Raynaud disease, Scalp psoriasis, Situational depression, and Tobacco dependence  ,  does not have any pertinent problems on file  ,   has a past surgical history that includes Chest tube insertion; DXA procedure(historical) (09/04/2009 & 8/24/2005); Mammo (historical) (02/14/2014); Hernia repair; and Cryotherapy  ,  family history includes Anxiety disorder in her other; Arthritis in her other; Cancer in her other; Depression in her other; Diabetes in her other; Heart disease in her other  ,   reports that she has been smoking  She has never used smokeless tobacco  She reports current alcohol use  She reports previous drug use ,  is allergic to erythromycin     Current Outpatient Medications   Medication Sig Dispense Refill    atorvastatin (LIPITOR) 10 mg tablet Take 1 tablet (10 mg total) by mouth daily 90 tablet 1    benzonatate (TESSALON) 200 MG capsule Take 1 capsule (200 mg total) by mouth 3 (three) times a day as needed for cough 30 capsule 2    calcium carbonate (OS-ONELIA) 600 MG tablet Take 1 tablet (600 mg total) by mouth 2 (two) times a day with meals 180 tablet 1    diclofenac (VOLTAREN) 75 mg EC tablet Take 1 tablet (75 mg total) by mouth 2 (two) times a day 60 tablet 5    fluticasone-umeclidinium-vilanterol (Trelegy Ellipta) 100-62 5-25 MCG/INH inhaler Inhale 1 puff daily Rinse mouth after use   1 each 0    fluticasone-umeclidinium-vilanterol (Trelegy Ellipta) 200-62 5-25 MCG/INH AEPB inhaler Inhale 1 puff daily Rinse mouth after use  2 each 0    HYDROcodone-acetaminophen (NORCO) 7 5-325 mg per tablet Take 1 tablet by mouth every 6 (six) hours as needed for pain Max Daily Amount: 3 tablets 90 tablet 0    hydrOXYzine pamoate (VISTARIL) 50 mg capsule Take 1 capsule (50 mg total) by mouth 3 (three) times a day as needed for itching 30 capsule 5    loratadine (CLARITIN) 10 mg tablet Take 1 tablet (10 mg total) by mouth daily 90 tablet 1    methocarbamol (ROBAXIN) 750 mg tablet Take 1 tablet (750 mg total) by mouth 4 (four) times a day 120 tablet 3    mirtazapine (REMERON) 45 MG tablet Take 1 tablet (45 mg total) by mouth daily at bedtime 30 tablet 2    Multiple Vitamins-Minerals (MULTIVITAMIN ADULT PO) Take by mouth      nicotine (NICODERM CQ) 21 mg/24 hr TD 24 hr patch apply 1 patch to CLEAN, DRY, AND INTACT SKIN REMOVE AND REPLACE every 24 hours 28 patch 11    nicotine polacrilex (COMMIT) 4 MG lozenge Apply 1 lozenge (4 mg total) to the mouth or throat as needed for smoking cessation 100 each 0    pyridoxine (VITAMIN B6) 50 mg tablet Take 1 tablet (50 mg total) by mouth 3 (three) times a day 90 tablet 5    QUEtiapine (SEROquel) 100 mg tablet Take 1 tablet (100 mg total) by mouth daily at bedtime 90 tablet 1    topiramate (TOPAMAX) 50 MG tablet Take 50 mg by mouth 2 (two) times a day      TURMERIC PO Take 1 tablet by mouth       No current facility-administered medications for this visit  Review of Systems   Constitutional: Negative for chills and fever  HENT: Negative for ear pain and sore throat  Eyes: Negative for pain and visual disturbance  Respiratory: Negative for cough and shortness of breath  Cardiovascular: Negative for chest pain and palpitations  Gastrointestinal: Negative for abdominal pain and vomiting  Genitourinary: Negative for dysuria and hematuria  Musculoskeletal: Positive for arthralgias, back pain and neck pain     Skin: Negative for color change and rash  Neurological: Negative for seizures and syncope  All other systems reviewed and are negative  Objective:    /74   Pulse 68   Temp (!) 97 4 °F (36 3 °C)   Resp 20   Ht 5' 2" (1 575 m)   Wt 60 3 kg (133 lb)   BMI 24 33 kg/m²   Body mass index is 24 33 kg/m²  Physical Exam  Constitutional:       Appearance: She is well-developed  HENT:      Head: Normocephalic  Eyes:      Pupils: Pupils are equal, round, and reactive to light  Cardiovascular:      Rate and Rhythm: Normal rate and regular rhythm  Heart sounds: Normal heart sounds  Pulmonary:      Effort: Pulmonary effort is normal       Breath sounds: Rhonchi present  Abdominal:      General: Bowel sounds are normal       Palpations: Abdomen is soft  Tenderness: There is no abdominal tenderness  Musculoskeletal:      Cervical back: Normal range of motion  Skin:     General: Skin is warm  Neurological:      Mental Status: She is alert and oriented to person, place, and time

## 2022-02-11 ENCOUNTER — OFFICE VISIT (OUTPATIENT)
Dept: FAMILY MEDICINE CLINIC | Facility: CLINIC | Age: 52
End: 2022-02-11
Payer: COMMERCIAL

## 2022-02-11 VITALS
RESPIRATION RATE: 20 BRPM | WEIGHT: 136 LBS | SYSTOLIC BLOOD PRESSURE: 126 MMHG | HEART RATE: 84 BPM | HEIGHT: 62 IN | BODY MASS INDEX: 25.03 KG/M2 | TEMPERATURE: 97.1 F | DIASTOLIC BLOOD PRESSURE: 84 MMHG

## 2022-02-11 DIAGNOSIS — F41.9 ANXIETY: ICD-10-CM

## 2022-02-11 DIAGNOSIS — J44.9 CHRONIC OBSTRUCTIVE PULMONARY DISEASE, UNSPECIFIED COPD TYPE (HCC): ICD-10-CM

## 2022-02-11 DIAGNOSIS — M54.9 CHRONIC NECK AND BACK PAIN: ICD-10-CM

## 2022-02-11 DIAGNOSIS — F33.41 RECURRENT MAJOR DEPRESSIVE DISORDER, IN PARTIAL REMISSION (HCC): ICD-10-CM

## 2022-02-11 DIAGNOSIS — F17.200 TOBACCO USE DISORDER: ICD-10-CM

## 2022-02-11 DIAGNOSIS — J40 BRONCHITIS: Primary | ICD-10-CM

## 2022-02-11 DIAGNOSIS — M54.2 CHRONIC NECK AND BACK PAIN: ICD-10-CM

## 2022-02-11 DIAGNOSIS — E78.2 MIXED HYPERLIPIDEMIA: ICD-10-CM

## 2022-02-11 DIAGNOSIS — G89.29 CHRONIC NECK AND BACK PAIN: ICD-10-CM

## 2022-02-11 PROCEDURE — 99213 OFFICE O/P EST LOW 20 MIN: CPT | Performed by: FAMILY MEDICINE

## 2022-02-11 RX ORDER — HYDROCODONE BITARTRATE AND ACETAMINOPHEN 7.5; 325 MG/1; MG/1
1 TABLET ORAL EVERY 6 HOURS PRN
Qty: 90 TABLET | Refills: 0 | Status: SHIPPED | OUTPATIENT
Start: 2022-02-11 | End: 2022-03-11 | Stop reason: SDUPTHER

## 2022-02-11 RX ORDER — METHYLPREDNISOLONE 4 MG/1
TABLET ORAL
Qty: 21 EACH | Refills: 0 | Status: SHIPPED | OUTPATIENT
Start: 2022-02-11 | End: 2022-05-16 | Stop reason: SDUPTHER

## 2022-02-11 RX ORDER — MIRTAZAPINE 45 MG/1
45 TABLET, FILM COATED ORAL
Qty: 30 TABLET | Refills: 2 | Status: SHIPPED | OUTPATIENT
Start: 2022-02-11 | End: 2022-05-11

## 2022-02-11 RX ORDER — AZITHROMYCIN 250 MG/1
TABLET, FILM COATED ORAL
Qty: 6 TABLET | Refills: 0 | Status: SHIPPED | OUTPATIENT
Start: 2022-02-11 | End: 2022-02-16

## 2022-02-11 RX ORDER — FLUTICASONE FUROATE, UMECLIDINIUM BROMIDE AND VILANTEROL TRIFENATATE 200; 62.5; 25 UG/1; UG/1; UG/1
1 POWDER RESPIRATORY (INHALATION) DAILY
Qty: 1 EACH | Refills: 5 | Status: SHIPPED | OUTPATIENT
Start: 2022-02-11 | End: 2022-06-14 | Stop reason: SDUPTHER

## 2022-02-11 RX ORDER — HYDROXYZINE PAMOATE 50 MG/1
50 CAPSULE ORAL 3 TIMES DAILY PRN
Qty: 30 CAPSULE | Refills: 5 | Status: SHIPPED | OUTPATIENT
Start: 2022-02-11 | End: 2022-08-09 | Stop reason: SDUPTHER

## 2022-02-11 RX ORDER — BENZONATATE 200 MG/1
200 CAPSULE ORAL 3 TIMES DAILY PRN
Qty: 30 CAPSULE | Refills: 2 | Status: SHIPPED | OUTPATIENT
Start: 2022-02-11 | End: 2022-04-11 | Stop reason: SDUPTHER

## 2022-02-11 NOTE — PROGRESS NOTES
Assessment/Plan:  Acute bronchitis in the light of severe COPD and continued tobacco use  Bilateral otitis, A will prescribe Medrol Dosepak as well as a Z-Burak and Tessalon Perles  Patient uses trilogy is a maintenance inhaler and albuterol as a rescue inhaler    Chronic neck and low back pain  The patient is treated with Cherylle Pagoda improves function  The PDMP has been reviewed no evidence of abuse or divergence  Norco was used in conjunction with Voltaren 75 b i d  Tobacco use disorder nicotine patch did not help nicotine lozenges were not covered    Insomnia treated with hydroxyzine 50 mg at bedtime    Anxiety and depression treated with Remeron and Seroquel    Hyperlipidemia on Lipitor 10 mg laboratories pending    Problem List Items Addressed This Visit     None      Visit Diagnoses     Chronic neck and back pain        Anxiety        Recurrent major depressive disorder, in partial remission ([facility] )        Chronic obstructive pulmonary disease, unspecified COPD type ([facility] )               Diagnoses and all orders for this visit:    Chronic neck and back pain    Anxiety    Recurrent major depressive disorder, in partial remission ([facility] )    Chronic obstructive pulmonary disease, unspecified COPD type ([facility] )        No problem-specific Assessment & Plan notes found for this encounter  PHQ-2/9 Depression Screening            Body mass index is 24 87 kg/m²  BMI Counseling: Body mass index is 24 87 kg/m²  The BMI     Subjective:      Patient ID: Travis Head is a 49 y o  female      Patient presents with a chief complaint of coughing up thick yellow phlegm nasal drainage and bilateral ear pain      The following portions of the patient's history were reviewed and updated as appropriate:   She has a past medical history of Allergies, Anxiety, Asthma, Bereavement, Bipolar disorder ([address] ), Carpal tunnel syndrome, bilateral, Chronic bronchitis ([address] ), Chronic constipation, Chronic low back pain, Chronic neck pain, COPD (chronic obstructive pulmonary disease) (HCC), Costochondritis, DDD (degenerative disc disease), cervical, Degenerative joint disease of left hip, Depression, Diverticulitis, DJD (degenerative joint disease) of knee, DJD (degenerative joint disease), ankle and foot, Edema, Fatigue, GABE (generalized anxiety disorder), GERD (gastroesophageal reflux disease), Hernia, inguinal, Hernia, umbilical, Hyperlipidemia, Hypertension, IBS (irritable bowel syndrome), Insomnia, Lower GI bleed, Migraine, Neuropathy, PTSD (post-traumatic stress disorder), Raynaud disease, Scalp psoriasis, Situational depression, and Tobacco dependence  ,  does not have any pertinent problems on file  ,   has a past surgical history that includes Chest tube insertion; DXA procedure(historical) (09/04/2009 & 8/24/2005); Mammo (historical) (02/14/2014); Hernia repair; and Cryotherapy  ,  family history includes Anxiety disorder in her other; Arthritis in her other; Cancer in her other; Depression in her other; Diabetes in her other; Heart disease in her other  ,   reports that she has been smoking  She has never used smokeless tobacco  She reports current alcohol use  She reports previous drug use ,  is allergic to erythromycin     Current Outpatient Medications   Medication Sig Dispense Refill    atorvastatin (LIPITOR) 10 mg tablet Take 1 tablet (10 mg total) by mouth daily 90 tablet 1    benzonatate (TESSALON) 200 MG capsule Take 1 capsule (200 mg total) by mouth 3 (three) times a day as needed for cough 30 capsule 2    calcium carbonate (OS-ONELIA) 600 MG tablet Take 1 tablet (600 mg total) by mouth 2 (two) times a day with meals 180 tablet 1    diclofenac (VOLTAREN) 75 mg EC tablet Take 1 tablet (75 mg total) by mouth 2 (two) times a day 60 tablet 5    fluticasone-umeclidinium-vilanterol (Trelegy Ellipta) 100-62 5-25 MCG/INH inhaler Inhale 1 puff daily Rinse mouth after use   1 each 0    fluticasone-umeclidinium-vilanterol (Trelegy Ellipta) 200-62 5-25 MCG/INH AEPB inhaler Inhale 1 puff daily Rinse mouth after use  2 each 0    HYDROcodone-acetaminophen (NORCO) 7 5-325 mg per tablet Take 1 tablet by mouth every 6 (six) hours as needed for pain Max Daily Amount: 3 tablets 90 tablet 0    hydrOXYzine pamoate (VISTARIL) 50 mg capsule Take 1 capsule (50 mg total) by mouth 3 (three) times a day as needed for itching 30 capsule 5    loratadine (CLARITIN) 10 mg tablet Take 1 tablet (10 mg total) by mouth daily 90 tablet 1    methocarbamol (ROBAXIN) 750 mg tablet Take 1 tablet (750 mg total) by mouth 4 (four) times a day 120 tablet 3    mirtazapine (REMERON) 45 MG tablet Take 1 tablet (45 mg total) by mouth daily at bedtime 30 tablet 2    Multiple Vitamins-Minerals (MULTIVITAMIN ADULT PO) Take by mouth      nicotine (NICODERM CQ) 21 mg/24 hr TD 24 hr patch Place 1 patch on the skin every 24 hours 28 patch 11    pyridoxine (VITAMIN B6) 50 mg tablet Take 1 tablet (50 mg total) by mouth 3 (three) times a day 90 tablet 5    QUEtiapine (SEROquel) 100 mg tablet Take 1 tablet (100 mg total) by mouth daily at bedtime 90 tablet 1    topiramate (TOPAMAX) 50 MG tablet Take 50 mg by mouth 2 (two) times a day      TURMERIC PO Take 1 tablet by mouth       No current facility-administered medications for this visit  Review of Systems   Constitutional: Positive for fatigue  Negative for chills and fever  HENT: Positive for congestion, ear pain, postnasal drip and rhinorrhea  Negative for sore throat  Eyes: Negative  Negative for pain and visual disturbance  Respiratory: Positive for cough, shortness of breath and wheezing  Cardiovascular: Negative  Negative for chest pain and palpitations  Gastrointestinal: Negative  Negative for abdominal pain and vomiting  Endocrine: Negative  Genitourinary: Negative  Negative for dysuria and hematuria  Musculoskeletal: Positive for back pain and neck pain  Negative for arthralgias  Skin: Negative  Negative for color change and rash  Allergic/Immunologic: Negative  Neurological: Negative  Negative for seizures and syncope  Hematological: Negative  Psychiatric/Behavioral: Negative  All other systems reviewed and are negative  Objective:    /84   Pulse 84   Temp (!) 97 1 °F (36 2 °C)   Resp 20   Ht 5' 2" (1 575 m)   Wt 61 7 kg (136 lb)   BMI 24 87 kg/m²   Body mass index is 24 87 kg/m²  Physical Exam  Constitutional:       Appearance: She is well-developed  HENT:      Head: Normocephalic  Right Ear: Tympanic membrane is erythematous and bulging  Left Ear: Tympanic membrane is erythematous and bulging  Nose: Congestion and rhinorrhea present  Eyes:      Pupils: Pupils are equal, round, and reactive to light  Cardiovascular:      Rate and Rhythm: Normal rate and regular rhythm  Heart sounds: Normal heart sounds  Pulmonary:      Effort: Pulmonary effort is normal       Breath sounds: Rhonchi present  Abdominal:      General: Bowel sounds are normal       Palpations: Abdomen is soft  Tenderness: There is no abdominal tenderness  Musculoskeletal:      Cervical back: Normal range of motion  Comments: Cervical paraspinal tenderness with decreased range of motion lumbar paraspinal tenderness with bilateral SI tenderness   Skin:     General: Skin is warm  Neurological:      Mental Status: She is alert and oriented to person, place, and time

## 2022-03-11 ENCOUNTER — OFFICE VISIT (OUTPATIENT)
Dept: FAMILY MEDICINE CLINIC | Facility: CLINIC | Age: 52
End: 2022-03-11
Payer: COMMERCIAL

## 2022-03-11 VITALS
BODY MASS INDEX: 24.87 KG/M2 | WEIGHT: 136 LBS | HEART RATE: 101 BPM | SYSTOLIC BLOOD PRESSURE: 110 MMHG | OXYGEN SATURATION: 96 % | DIASTOLIC BLOOD PRESSURE: 70 MMHG | RESPIRATION RATE: 16 BRPM

## 2022-03-11 DIAGNOSIS — G89.29 CHRONIC BILATERAL LOW BACK PAIN WITHOUT SCIATICA: Primary | ICD-10-CM

## 2022-03-11 DIAGNOSIS — F33.41 RECURRENT MAJOR DEPRESSIVE DISORDER, IN PARTIAL REMISSION (HCC): ICD-10-CM

## 2022-03-11 DIAGNOSIS — G89.29 CHRONIC NECK AND BACK PAIN: ICD-10-CM

## 2022-03-11 DIAGNOSIS — M54.9 CHRONIC NECK AND BACK PAIN: ICD-10-CM

## 2022-03-11 DIAGNOSIS — J43.1 PANLOBULAR EMPHYSEMA (HCC): ICD-10-CM

## 2022-03-11 DIAGNOSIS — F17.200 TOBACCO USE DISORDER: ICD-10-CM

## 2022-03-11 DIAGNOSIS — M54.50 CHRONIC BILATERAL LOW BACK PAIN WITHOUT SCIATICA: Primary | ICD-10-CM

## 2022-03-11 DIAGNOSIS — M54.2 CHRONIC NECK AND BACK PAIN: ICD-10-CM

## 2022-03-11 PROCEDURE — 99213 OFFICE O/P EST LOW 20 MIN: CPT | Performed by: FAMILY MEDICINE

## 2022-03-11 RX ORDER — PHENOL 1.4 %
600 AEROSOL, SPRAY (ML) MUCOUS MEMBRANE 2 TIMES DAILY WITH MEALS
Qty: 180 TABLET | Refills: 1 | Status: SHIPPED | OUTPATIENT
Start: 2022-03-11

## 2022-03-11 RX ORDER — HYDROCODONE BITARTRATE AND ACETAMINOPHEN 7.5; 325 MG/1; MG/1
1 TABLET ORAL EVERY 6 HOURS PRN
Qty: 90 TABLET | Refills: 0 | Status: SHIPPED | OUTPATIENT
Start: 2022-03-11 | End: 2022-04-11 | Stop reason: SDUPTHER

## 2022-03-11 NOTE — PROGRESS NOTES
Assessment/Plan:  Chronic neck and back pain treated with nonsteroidal anti-inflammatory in opioid therapy as well as muscle relaxants  The PDMP has been reviewed no issues found no evidence of divergence or abuse hydrocodone improves function  Chronic obstructive pulmonary disease and chronic bronchitis with asthmatic component patient uses trilogy as a maintenance inhaler and albuterol as rescue inhaler    Nicotine dependence the patient states when she wears a nicotine mask she smokes 1 pack per day when she does not use the nicotine patch she smokes 2 packs per day    Chronic anxiety treated with hydroxyzine    Primary insomnia treated with Seroquel    Depression treated with Remeron    Problem List Items Addressed This Visit        Other    Chronic bilateral low back pain without sciatica      Other Visit Diagnoses     Chronic neck and back pain               Diagnoses and all orders for this visit:    Chronic bilateral low back pain without sciatica    Chronic neck and back pain        No problem-specific Assessment & Plan notes found for this encounter  PHQ-2/9 Depression Screening            Body mass index is 24 87 kg/m²  BMI Counseling: Body mass index is 24 87 kg/m²  The BMI   Subjective:      Patient ID: Nicolette Self is a 49 y o  female      Patient presents for 1 month checkup      The following portions of the patient's history were reviewed and updated as appropriate:   She has a past medical history of Allergies, Anxiety, Asthma, Bereavement, Bipolar disorder ([address] ), Carpal tunnel syndrome, bilateral, Chronic bronchitis ([address] ), Chronic constipation, Chronic low back pain, Chronic neck pain, COPD (chronic obstructive pulmonary disease) ([address] ), Costochondritis, DDD (degenerative disc disease), cervical, Degenerative joint disease of left hip, Depression, Diverticulitis, DJD (degenerative joint disease) of knee, DJD (degenerative joint disease), ankle and foot, Edema, Fatigue, GABE (generalized anxiety disorder), GERD (gastroesophageal reflux disease), Hernia, inguinal, Hernia, umbilical, Hyperlipidemia, Hypertension, IBS (irritable bowel syndrome), Insomnia, Lower GI bleed, Migraine, Neuropathy, PTSD (post-traumatic stress disorder), Raynaud disease, Scalp psoriasis, Situational depression, and Tobacco dependence  ,  does not have any pertinent problems on file  ,   has a past surgical history that includes Chest tube insertion; DXA procedure(historical) (09/04/2009 & 8/24/2005); Mammo (historical) (02/14/2014); Hernia repair; and Cryotherapy  ,  family history includes Anxiety disorder in her other; Arthritis in her other; Cancer in her other; Depression in her other; Diabetes in her other; Heart disease in her other  ,   reports that she has been smoking  She has never used smokeless tobacco  She reports current alcohol use  She reports previous drug use ,  is allergic to erythromycin     Current Outpatient Medications   Medication Sig Dispense Refill    nicotine (NICODERM CQ) 21 mg/24 hr TD 24 hr patch Place 1 patch on the skin every 24 hours (Patient taking differently: Place 1 patch on the skin every 24 hours Uses off and on ) 28 patch 11    atorvastatin (LIPITOR) 10 mg tablet Take 1 tablet (10 mg total) by mouth daily 90 tablet 1    benzonatate (TESSALON) 200 MG capsule Take 1 capsule (200 mg total) by mouth 3 (three) times a day as needed for cough 30 capsule 2    calcium carbonate (OS-ONELIA) 600 MG tablet Take 1 tablet (600 mg total) by mouth 2 (two) times a day with meals 180 tablet 1    diclofenac (VOLTAREN) 75 mg EC tablet Take 1 tablet (75 mg total) by mouth 2 (two) times a day 60 tablet 5    fluticasone-umeclidinium-vilanterol (Trelegy Ellipta) 100-62 5-25 MCG/INH inhaler Inhale 1 puff daily Rinse mouth after use  1 each 0    fluticasone-umeclidinium-vilanterol (Trelegy Ellipta) 200-62 5-25 MCG/INH AEPB inhaler Inhale 1 puff daily Rinse mouth after use   1 each 5    HYDROcodone-acetaminophen (NORCO) 7 5-325 mg per tablet Take 1 tablet by mouth every 6 (six) hours as needed for pain Max Daily Amount: 3 tablets 90 tablet 0    hydrOXYzine pamoate (VISTARIL) 50 mg capsule Take 1 capsule (50 mg total) by mouth 3 (three) times a day as needed for itching 30 capsule 5    loratadine (CLARITIN) 10 mg tablet Take 1 tablet (10 mg total) by mouth daily 90 tablet 1    methocarbamol (ROBAXIN) 750 mg tablet Take 1 tablet (750 mg total) by mouth 4 (four) times a day 120 tablet 3    methylPREDNISolone 4 MG tablet therapy pack Use as directed on package 21 each 0    mirtazapine (REMERON) 45 MG tablet Take 1 tablet (45 mg total) by mouth daily at bedtime 30 tablet 2    Multiple Vitamins-Minerals (MULTIVITAMIN ADULT PO) Take by mouth      pyridoxine (VITAMIN B6) 50 mg tablet Take 1 tablet (50 mg total) by mouth 3 (three) times a day 90 tablet 5    QUEtiapine (SEROquel) 100 mg tablet Take 1 tablet (100 mg total) by mouth daily at bedtime 90 tablet 1    topiramate (TOPAMAX) 50 MG tablet Take 50 mg by mouth 2 (two) times a day (Patient not taking: Reported on 3/11/2022 )      TURMERIC PO Take 1 tablet by mouth       No current facility-administered medications for this visit  Review of Systems   Constitutional: Negative for chills and fever  HENT: Negative for ear pain and sore throat  Eyes: Negative for pain and visual disturbance  Respiratory: Positive for shortness of breath and wheezing  Negative for cough  Cardiovascular: Negative for chest pain and palpitations  Gastrointestinal: Negative for abdominal pain and vomiting  Genitourinary: Negative for dysuria and hematuria  Musculoskeletal: Positive for back pain and neck pain  Negative for arthralgias  Skin: Negative for color change and rash  Neurological: Negative for seizures and syncope  All other systems reviewed and are negative          Objective:    /70 (BP Location: Left arm, Patient Position: Sitting, Cuff Size: Standard)   Pulse 101   Resp 16   Wt 61 7 kg (136 lb)   SpO2 96%   BMI 24 87 kg/m²   Body mass index is 24 87 kg/m²  Physical Exam  Constitutional:       Appearance: She is well-developed  HENT:      Head: Normocephalic  Eyes:      Pupils: Pupils are equal, round, and reactive to light  Cardiovascular:      Rate and Rhythm: Normal rate and regular rhythm  Heart sounds: Normal heart sounds  Pulmonary:      Effort: Pulmonary effort is normal       Breath sounds: Wheezing present  Abdominal:      General: Bowel sounds are normal       Palpations: Abdomen is soft  Tenderness: There is no abdominal tenderness  Musculoskeletal:      Cervical back: Normal range of motion  Comments: Pain on palpation of the paraspinal muscles of the cervical and lumbar spine with bilateral SI tenderness   Skin:     General: Skin is warm  Neurological:      Mental Status: She is alert and oriented to person, place, and time

## 2022-04-11 ENCOUNTER — OFFICE VISIT (OUTPATIENT)
Dept: FAMILY MEDICINE CLINIC | Facility: CLINIC | Age: 52
End: 2022-04-11
Payer: COMMERCIAL

## 2022-04-11 VITALS
HEART RATE: 84 BPM | BODY MASS INDEX: 24.48 KG/M2 | DIASTOLIC BLOOD PRESSURE: 74 MMHG | RESPIRATION RATE: 20 BRPM | HEIGHT: 62 IN | SYSTOLIC BLOOD PRESSURE: 126 MMHG | TEMPERATURE: 98 F | WEIGHT: 133 LBS

## 2022-04-11 DIAGNOSIS — J45.40 MODERATE PERSISTENT ASTHMA, UNSPECIFIED WHETHER COMPLICATED: ICD-10-CM

## 2022-04-11 DIAGNOSIS — M54.2 CHRONIC NECK AND BACK PAIN: ICD-10-CM

## 2022-04-11 DIAGNOSIS — M54.9 CHRONIC NECK AND BACK PAIN: ICD-10-CM

## 2022-04-11 DIAGNOSIS — Z72.0 TOBACCO ABUSE: ICD-10-CM

## 2022-04-11 DIAGNOSIS — J20.9 ACUTE BRONCHITIS, UNSPECIFIED ORGANISM: Primary | ICD-10-CM

## 2022-04-11 DIAGNOSIS — J44.9 CHRONIC OBSTRUCTIVE PULMONARY DISEASE, UNSPECIFIED COPD TYPE (HCC): ICD-10-CM

## 2022-04-11 DIAGNOSIS — G89.29 CHRONIC NECK AND BACK PAIN: ICD-10-CM

## 2022-04-11 PROCEDURE — 99213 OFFICE O/P EST LOW 20 MIN: CPT | Performed by: FAMILY MEDICINE

## 2022-04-11 RX ORDER — DOXYCYCLINE 100 MG/1
100 CAPSULE ORAL 2 TIMES DAILY
Qty: 20 CAPSULE | Refills: 1 | Status: SHIPPED | OUTPATIENT
Start: 2022-04-11 | End: 2022-04-21

## 2022-04-11 RX ORDER — BENZONATATE 200 MG/1
200 CAPSULE ORAL 3 TIMES DAILY PRN
Qty: 30 CAPSULE | Refills: 2 | Status: SHIPPED | OUTPATIENT
Start: 2022-04-11 | End: 2022-06-14 | Stop reason: SDUPTHER

## 2022-04-11 RX ORDER — HYDROCODONE BITARTRATE AND ACETAMINOPHEN 7.5; 325 MG/1; MG/1
1 TABLET ORAL EVERY 6 HOURS PRN
Qty: 90 TABLET | Refills: 0 | Status: SHIPPED | OUTPATIENT
Start: 2022-04-11 | End: 2022-05-16 | Stop reason: SDUPTHER

## 2022-04-11 RX ORDER — METHYLPREDNISOLONE 4 MG/1
TABLET ORAL
Qty: 21 EACH | Refills: 0 | Status: SHIPPED | OUTPATIENT
Start: 2022-04-11 | End: 2022-06-14 | Stop reason: ALTCHOICE

## 2022-04-11 RX ORDER — DEXTROMETHORPHAN HYDROBROMIDE AND PROMETHAZINE HYDROCHLORIDE 15; 6.25 MG/5ML; MG/5ML
5 SOLUTION ORAL 4 TIMES DAILY PRN
Qty: 180 ML | Refills: 1 | Status: SHIPPED | OUTPATIENT
Start: 2022-04-11 | End: 2022-05-16 | Stop reason: SDUPTHER

## 2022-04-14 ENCOUNTER — OFFICE VISIT (OUTPATIENT)
Dept: FAMILY MEDICINE CLINIC | Facility: CLINIC | Age: 52
End: 2022-04-14
Payer: COMMERCIAL

## 2022-04-14 ENCOUNTER — HOSPITAL ENCOUNTER (EMERGENCY)
Facility: HOSPITAL | Age: 52
Discharge: HOME | End: 2022-04-14
Attending: EMERGENCY MEDICINE | Admitting: EMERGENCY MEDICINE
Payer: COMMERCIAL

## 2022-04-14 ENCOUNTER — HOSPITAL ENCOUNTER (OUTPATIENT)
Dept: RADIOLOGY | Facility: HOSPITAL | Age: 52
Discharge: HOME | End: 2022-04-14
Attending: FAMILY MEDICINE
Payer: COMMERCIAL

## 2022-04-14 VITALS
HEIGHT: 62 IN | TEMPERATURE: 98.8 F | WEIGHT: 133 LBS | RESPIRATION RATE: 20 BRPM | DIASTOLIC BLOOD PRESSURE: 78 MMHG | HEART RATE: 84 BPM | SYSTOLIC BLOOD PRESSURE: 118 MMHG | BODY MASS INDEX: 24.48 KG/M2

## 2022-04-14 VITALS
TEMPERATURE: 97.1 F | HEART RATE: 104 BPM | SYSTOLIC BLOOD PRESSURE: 149 MMHG | RESPIRATION RATE: 16 BRPM | DIASTOLIC BLOOD PRESSURE: 73 MMHG | OXYGEN SATURATION: 95 %

## 2022-04-14 DIAGNOSIS — M79.675 PAIN OF TOE OF LEFT FOOT: ICD-10-CM

## 2022-04-14 DIAGNOSIS — S81.812A LACERATION OF LEFT LOWER EXTREMITY, INITIAL ENCOUNTER: Primary | ICD-10-CM

## 2022-04-14 DIAGNOSIS — S93.409A ANKLE SPRAIN: Primary | ICD-10-CM

## 2022-04-14 DIAGNOSIS — M25.571 ACUTE RIGHT ANKLE PAIN: ICD-10-CM

## 2022-04-14 DIAGNOSIS — S80.812A ABRASION OF LEFT LOWER EXTREMITY, INITIAL ENCOUNTER: ICD-10-CM

## 2022-04-14 PROCEDURE — 73630 X-RAY EXAM OF FOOT: CPT

## 2022-04-14 PROCEDURE — 12020 TX SUPFC WND DEHSN SMPL CLSR: CPT | Performed by: FAMILY MEDICINE

## 2022-04-14 PROCEDURE — 99283 EMERGENCY DEPT VISIT LOW MDM: CPT

## 2022-04-14 PROCEDURE — 99213 OFFICE O/P EST LOW 20 MIN: CPT | Performed by: FAMILY MEDICINE

## 2022-04-14 PROCEDURE — 99284 EMERGENCY DEPT VISIT MOD MDM: CPT | Performed by: PHYSICIAN ASSISTANT

## 2022-04-14 PROCEDURE — 73610 X-RAY EXAM OF ANKLE: CPT

## 2022-04-14 NOTE — ED PROVIDER NOTES
History  Chief Complaint   Patient presents with    Ankle Injury     R ankle, had outpatient films done today  Wanted second evaluation  Patient presents to the emergency department today essentially for a brace  This is a 52-year-old female who actually was seen at her primary care doctor's office today  She states she sustained a mechanical fall earlier  She had pain in both legs she had some laceration spray shins that initially were recommend to have sutures by her primary care of her patient refused  She states she has Steri-Strips in place with bandages  She does not want these evaluated here  She was sent for x-rays as an outpatient for the right ankle, she had this completed does not know the results of this but states that her primary care 1 her to have a brace put on the right ankle and follow back up with him  She states there is no outpatient commercial cites available to give her a brace therefore she came here  Again she does not want the left leg evaluated whatsoever  I did independently review the x-rays that were completed here today  There appears to be chronic lucencies of the distal tibial region  She is not tender in this region again they appear well ossified I do not believe this is exacerbated at all by today's injuries  I do not see any distal fibular involvement which sore her tenderness is  We will provide air ankle splint and discharged home  Prior to Admission Medications   Prescriptions Last Dose Informant Patient Reported? Taking?    Elastic Bandages & Supports (Ankle Splint/Canvas Medium) MISC   No No   Sig: Use continuous for 7 days Right ankle splint for sprain   HYDROcodone-acetaminophen (NORCO) 7 5-325 mg per tablet   No No   Sig: Take 1 tablet by mouth every 6 (six) hours as needed for pain Max Daily Amount: 3 tablets   Multiple Vitamins-Minerals (MULTIVITAMIN ADULT PO)   Yes No   Sig: Take by mouth   Promethazine-DM (PHENERGAN-DM) 6 25-15 mg/5 mL oral syrup   No No   Sig: Take 5 mL by mouth 4 (four) times a day as needed for cough   QUEtiapine (SEROquel) 100 mg tablet   No No   Sig: Take 1 tablet (100 mg total) by mouth daily at bedtime   TURMERIC PO   Yes No   Sig: Take 1 tablet by mouth   atorvastatin (LIPITOR) 10 mg tablet   No No   Sig: Take 1 tablet (10 mg total) by mouth daily   benzonatate (TESSALON) 200 MG capsule   No No   Sig: Take 1 capsule (200 mg total) by mouth 3 (three) times a day as needed for cough   calcium carbonate (OS-ONELIA) 600 MG tablet   No No   Sig: Take 1 tablet (600 mg total) by mouth 2 (two) times a day with meals   diclofenac (VOLTAREN) 75 mg EC tablet   No No   Sig: Take 1 tablet (75 mg total) by mouth 2 (two) times a day   doxycycline monohydrate (MONODOX) 100 mg capsule   No No   Sig: Take 1 capsule (100 mg total) by mouth 2 (two) times a day for 10 days   fluticasone-umeclidinium-vilanterol (Trelegy Ellipta) 100-62 5-25 MCG/INH inhaler   No No   Sig: Inhale 1 puff daily Rinse mouth after use     fluticasone-umeclidinium-vilanterol (Trelegy Ellipta) 200-62 5-25 MCG/INH AEPB inhaler   No No   Sig: Inhale 1 puff daily Rinse mouth after use    hydrOXYzine pamoate (VISTARIL) 50 mg capsule   No No   Sig: Take 1 capsule (50 mg total) by mouth 3 (three) times a day as needed for itching   loratadine (CLARITIN) 10 mg tablet   No No   Sig: Take 1 tablet (10 mg total) by mouth daily   methocarbamol (ROBAXIN) 750 mg tablet   No No   Sig: Take 1 tablet (750 mg total) by mouth 4 (four) times a day   methylPREDNISolone 4 MG tablet therapy pack   No No   Sig: Use as directed on package   methylPREDNISolone 4 MG tablet therapy pack   No No   Sig: Use as directed on package   mirtazapine (REMERON) 45 MG tablet   No No   Sig: Take 1 tablet (45 mg total) by mouth daily at bedtime   nicotine (NICODERM CQ) 21 mg/24 hr TD 24 hr patch   No No   Sig: Place 1 patch on the skin every 24 hours   Patient taking differently: Place 1 patch on the skin every 24 hours Uses off and on    pyridoxine (VITAMIN B6) 50 mg tablet   No No   Sig: Take 1 tablet (50 mg total) by mouth 3 (three) times a day   topiramate (TOPAMAX) 50 MG tablet   Yes No   Sig: Take 50 mg by mouth 2 (two) times a day   Patient not taking: Reported on 3/11/2022       Facility-Administered Medications: None       Past Medical History:   Diagnosis Date    Allergies     Anxiety     Asthma     Bereavement     Bipolar disorder ([facility] [address] )     Carpal tunnel syndrome, bilateral     [facility])     Chronic constipation     Chronic low back pain     Chronic neck pain     COPD (chronic obstructive pulmonary disease) ([facility])     Costochondritis     DDD (degenerative disc disease), cervical     Degenerative joint disease of left hip     Depression     Diverticulitis     DJD (degenerative joint disease) of knee     DJD (degenerative joint disease), ankle and foot     Edema     Fatigue     GABE (generalized anxiety disorder)     GERD (gastroesophageal reflux disease)     Hernia, inguinal     Hernia, umbilical     Hyperlipidemia     Hypertension     IBS (irritable bowel syndrome)     Insomnia     Lower GI bleed     Migraine     Neuropathy     PTSD (post-traumatic stress disorder)     Raynaud disease     Scalp psoriasis     Situational depression     Tobacco dependence        Past Surgical History:   Procedure Laterality Date    CHEST TUBE INSERTION      CRYOTHERAPY      Plantar wart    DXA PROCEDURE (HISTORICAL)  09/04/2009 & 8/24/2005     [employer]    HERNIA REPAIR      Umbilical    MAMMO ([facility]OR[facility])  [facility]14/[facility]       Family History   Problem Relation Age of Onset    Heart disease Other     Diabetes Other     Arthritis Other     Anxiety disorder Other     Depression Other     Cancer Other      I have reviewed and agree with the history as documented      E-Cigarette/Vaping    E-Cigarette Use Never User      E-Cigarette/Vaping Substances     Social History     Tobacco Use    Smoking status: Current Every Day Smoker    Smokeless tobacco: Never Used   Vaping Use    Vaping Use: Never used   Substance Use Topics    Alcohol use: Yes     Comment: Occasionally    Drug use: Not Currently       Review of Systems   Constitutional: Negative for chills and fever  HENT: Negative for ear pain and sore throat  Eyes: Negative for pain and visual disturbance  Respiratory: Negative for cough and shortness of breath  Cardiovascular: Negative for chest pain and palpitations  Gastrointestinal: Negative for abdominal pain and vomiting  Genitourinary: Negative for dysuria and hematuria  Musculoskeletal: Negative for arthralgias and back pain  Right ankle pain   Skin: Negative for color change and rash  Neurological: Negative for seizures and syncope  All other systems reviewed and are negative  Physical Exam  Physical Exam  Vitals reviewed  Constitutional:       General: She is not in acute distress  Appearance: Normal appearance  She is not ill-appearing, toxic-appearing or diaphoretic  HENT:      Head: Normocephalic and atraumatic  Right Ear: External ear normal       Left Ear: External ear normal    Eyes:      General: No scleral icterus  Right eye: No discharge  Left eye: No discharge  Extraocular Movements: Extraocular movements intact  Conjunctiva/sclera: Conjunctivae normal    Cardiovascular:      Rate and Rhythm: Normal rate  Pulses: Normal pulses  Pulmonary:      Effort: Pulmonary effort is normal  No respiratory distress  Breath sounds: No stridor  Musculoskeletal:         General: Tenderness present  No deformity or signs of injury  Cervical back: Normal range of motion  No rigidity  Comments: Patient has right distal fibular tenderness  Normal range of motion normal dorsalis pedis pulse normal sensation no proximal tib-fib tenderness   Skin:     General: Skin is warm  Coloration: Skin is not jaundiced  Findings: No lesion or rash  Neurological:      General: No focal deficit present  Mental Status: She is alert and oriented to person, place, and time  Mental status is at baseline  Gait: Gait normal    Psychiatric:         Mood and Affect: Mood normal          Thought Content: Thought content normal          Judgment: Judgment normal          Vital Signs  ED Triage Vitals [04/14/22 1429]   Temperature Pulse Respirations Blood Pressure SpO2   (!) 97 1 °F (36 2 °C) 104 16 149/73 95 %      Temp Source Heart Rate Source Patient Position - Orthostatic VS BP Location FiO2 (%)   Tympanic Monitor Sitting Right arm --      Pain Score       6           Vitals:    04/14/22 1429   BP: 149/73   Pulse: 104   Patient Position - Orthostatic VS: Sitting         Visual Acuity      ED Medications  Medications - No data to display    Diagnostic Studies  Results Reviewed     None                 No orders to display              Procedures  Procedures         ED Course                                             MDM    Disposition  Final diagnoses: Ankle sprain - right sided     Time reflects when diagnosis was documented in both MDM as applicable and the Disposition within this note     Time User Action Codes Description Comment    4/14/2022  2:35 PM Sarah Carballo Add [D70 628F] Ankle sprain     4/14/2022  2:35 PM Sarah Carballo Modify [[id number]] Ankle sprain right sided      ED Disposition     ED Disposition Condition Date/Time Comment    Discharge Stable Thu Apr 14, 2022  2:35 PM Nam Mendoza discharge to home/self care              Follow-up Information     Follow up With Specialties Details Why Contact Info Additional [address] [facility] [place] [facility] Schedule an appointment as soon as possible for a visit  As needed [address] [facility] [address]  [address] [place] [address] Specialists [place], [facility] [address] [place], [place], [address]          Patient's Medications   Discharge Prescriptions    No medications on file       No discharge procedures on file      PDMP Review       Value Time User    PDMP Reviewed  Yes 4/11/2022  2:10 PM Genaro Grier DO          ED Provider  Electronically Signed by           Neeta Lui PA-C  04/14/22 5499

## 2022-04-14 NOTE — PROGRESS NOTES
Laceration repair    Date/Time: 4/14/2022 1:21 PM  Performed by: Aimee Leblanc DO  Authorized by: Aimee Leblanc DO   Consent: Verbal consent obtained  Written consent not obtained  Risks and benefits: risks, benefits and alternatives were discussed  Consent given by: patient  Patient understanding: patient states understanding of the procedure being performed  Patient consent: the patient's understanding of the procedure matches consent given  Procedure consent: procedure consent matches procedure scheduled  Relevant documents: relevant documents present and verified  Test results: test results not available  Site marked: the operative site was marked  Radiology Images displayed and confirmed  If images not available, report reviewed: imaging studies not available  Patient identity confirmed: verbally with patient  Time out: Immediately prior to procedure a "time out" was called to verify the correct patient, procedure, equipment, support staff and site/side marked as required  Body area: lower extremity  Location details: left lower leg  Foreign bodies: no foreign bodies  Tendon involvement: none  Nerve involvement: none  Vascular damage: no    Sedation:  Patient sedated: no      Wound Dehiscence:  Superficial Wound Dehiscence: simple closure      Procedure Details:  Debridement: none  Degree of undermining: none  Skin closure: Steri-Strips  Approximation: close  Approximation difficulty: simple  Dressing: 4x4 sterile gauze         Assessment/Plan:  Laceration of left anterior tibial area 2 cm in length clean no debris will apply wound closure strips  Patient was offered suture closure but deferred the same  Wound closure strips seems a reasonable alternative and were applied  Abrasion below the laceration cleansed and bandaged  Left 2nd toe edema will edith tape to the 3rd toe and x-ray to rule out fracture  Right ankle edema and pain will x-ray and splint      Problem List Items Addressed This Visit None           There are no diagnoses linked to this encounter  No problem-specific Assessment & Plan notes found for this encounter  PHQ-2/9 Depression Screening    Little interest or pleasure in doing things: 0 - not at all  Feeling down, depressed, or hopeless: 0 - not at all  PHQ-2 Score: 0  PHQ-2 Interpretation: Negative depression screen          Body mass index is 24 33 kg/m²  BMI Counseling: Body mass index is 24 33 kg/m²  The BMI Subjective:      Patient ID: Tung Rose is a 49 y o  female  Radha Spindle in the yd today on the sidewalk with an abrasion and laceration of the left calf pain of the left 2nd toe with swelling and pain of the right ankle      The following portions of the patient's history were reviewed and updated as appropriate:   She has a past medical history of Allergies, Anxiety, Asthma, Bereavement, Bipolar disorder ([address] ), Carpal tunnel syndrome, bilateral, Chronic bronchitis ([address] ), Chronic constipation, Chronic low back pain, Chronic neck pain, COPD (chronic obstructive pulmonary disease) ([address] ), Costochondritis, DDD (degenerative disc disease), cervical, Degenerative joint disease of left hip, Depression, Diverticulitis, DJD (degenerative joint disease) of knee, DJD (degenerative joint disease), ankle and foot, Edema, Fatigue, GABE (generalized anxiety disorder), GERD (gastroesophageal reflux disease), Hernia, inguinal, Hernia, umbilical, Hyperlipidemia, Hypertension, IBS (irritable bowel syndrome), Insomnia, Lower GI bleed, Migraine, Neuropathy, PTSD (post-traumatic stress disorder), Raynaud disease, Scalp psoriasis, Situational depression, and Tobacco dependence  ,  does not have any pertinent problems on file  ,   has a past surgical history that includes Chest tube insertion; DXA procedure(historical) (09/04/2009 & 8/24/2005); Mammo (historical) (02/14/2014); Hernia repair; and Cryotherapy  ,  family history includes Anxiety disorder in her other; Arthritis in her other; Cancer in her other; Depression in her other; Diabetes in her other; Heart disease in her other  ,   reports that she has been smoking  She has never used smokeless tobacco  She reports current alcohol use  She reports previous drug use ,  is allergic to erythromycin     Current Outpatient Medications   Medication Sig Dispense Refill    atorvastatin (LIPITOR) 10 mg tablet Take 1 tablet (10 mg total) by mouth daily 90 tablet 1    benzonatate (TESSALON) 200 MG capsule Take 1 capsule (200 mg total) by mouth 3 (three) times a day as needed for cough 30 capsule 2    calcium carbonate (OS-ONELIA) 600 MG tablet Take 1 tablet (600 mg total) by mouth 2 (two) times a day with meals 180 tablet 1    diclofenac (VOLTAREN) 75 mg EC tablet Take 1 tablet (75 mg total) by mouth 2 (two) times a day 60 tablet 5    doxycycline monohydrate (MONODOX) 100 mg capsule Take 1 capsule (100 mg total) by mouth 2 (two) times a day for 10 days 20 capsule 1    fluticasone-umeclidinium-vilanterol (Trelegy Ellipta) 100-62 5-25 MCG/INH inhaler Inhale 1 puff daily Rinse mouth after use  1 each 0    fluticasone-umeclidinium-vilanterol (Trelegy Ellipta) 200-62 5-25 MCG/INH AEPB inhaler Inhale 1 puff daily Rinse mouth after use   1 each 5    HYDROcodone-acetaminophen (NORCO) 7 5-325 mg per tablet Take 1 tablet by mouth every 6 (six) hours as needed for pain Max Daily Amount: 3 tablets 90 tablet 0    hydrOXYzine pamoate (VISTARIL) 50 mg capsule Take 1 capsule (50 mg total) by mouth 3 (three) times a day as needed for itching 30 capsule 5    loratadine (CLARITIN) 10 mg tablet Take 1 tablet (10 mg total) by mouth daily 90 tablet 1    methocarbamol (ROBAXIN) 750 mg tablet Take 1 tablet (750 mg total) by mouth 4 (four) times a day 120 tablet 3    methylPREDNISolone 4 MG tablet therapy pack Use as directed on package 21 each 0    methylPREDNISolone 4 MG tablet therapy pack Use as directed on package 21 each 0    mirtazapine (REMERON) 45 MG tablet Take 1 tablet (45 mg total) by mouth daily at bedtime 30 tablet 2    Multiple Vitamins-Minerals (MULTIVITAMIN ADULT PO) Take by mouth      nicotine (NICODERM CQ) 21 mg/24 hr TD 24 hr patch Place 1 patch on the skin every 24 hours (Patient taking differently: Place 1 patch on the skin every 24 hours Uses off and on ) 28 patch 11    Promethazine-DM (PHENERGAN-DM) 6 25-15 mg/5 mL oral syrup Take 5 mL by mouth 4 (four) times a day as needed for cough 180 mL 1    pyridoxine (VITAMIN B6) 50 mg tablet Take 1 tablet (50 mg total) by mouth 3 (three) times a day 90 tablet 5    QUEtiapine (SEROquel) 100 mg tablet Take 1 tablet (100 mg total) by mouth daily at bedtime 90 tablet 1    topiramate (TOPAMAX) 50 MG tablet Take 50 mg by mouth 2 (two) times a day (Patient not taking: Reported on 3/11/2022 )      TURMERIC PO Take 1 tablet by mouth       No current facility-administered medications for this visit  Review of Systems   Constitutional: Negative for chills and fever  HENT: Negative for ear pain and sore throat  Eyes: Negative for pain and visual disturbance  Respiratory: Negative for cough and shortness of breath  Cardiovascular: Negative for chest pain and palpitations  Gastrointestinal: Negative for abdominal pain and vomiting  Genitourinary: Negative for dysuria and hematuria  Musculoskeletal: Negative for arthralgias and back pain  Right ankle pain left 2nd toe pain after fall   Skin: Negative for color change and rash  Neurological: Negative for seizures and syncope  All other systems reviewed and are negative  Objective:    /78   Pulse 84   Temp 98 8 °F (37 1 °C)   Resp 20   Ht 5' 2" (1 575 m)   Wt 60 3 kg (133 lb)   BMI 24 33 kg/m²   Body mass index is 24 33 kg/m²  Physical Exam  Constitutional:       Appearance: She is well-developed  HENT:      Head: Normocephalic  Eyes:      Pupils: Pupils are equal, round, and reactive to light     Cardiovascular: Rate and Rhythm: Normal rate and regular rhythm  Heart sounds: Normal heart sounds  Pulmonary:      Effort: Pulmonary effort is normal       Breath sounds: Normal breath sounds  Abdominal:      General: Bowel sounds are normal       Palpations: Abdomen is soft  Tenderness: There is no abdominal tenderness  Musculoskeletal:      Cervical back: Normal range of motion  Legs:         Feet:       Comments: Edema of left 2nd toe edema of right lateral malleolus    Laceration of anterior tibial area of the left lower extremity abrasion of the lower anterior tibial area   Feet:      Comments: Area of swelling  Skin:     General: Skin is warm  Neurological:      Mental Status: She is alert and oriented to person, place, and time

## 2022-04-21 ENCOUNTER — OFFICE VISIT (OUTPATIENT)
Dept: FAMILY MEDICINE CLINIC | Facility: CLINIC | Age: 52
End: 2022-04-21
Payer: COMMERCIAL

## 2022-04-21 VITALS
WEIGHT: 130 LBS | RESPIRATION RATE: 20 BRPM | DIASTOLIC BLOOD PRESSURE: 68 MMHG | SYSTOLIC BLOOD PRESSURE: 124 MMHG | BODY MASS INDEX: 23.92 KG/M2 | HEART RATE: 68 BPM | HEIGHT: 62 IN | TEMPERATURE: 98.1 F

## 2022-04-21 DIAGNOSIS — J44.9 CHRONIC OBSTRUCTIVE PULMONARY DISEASE, UNSPECIFIED COPD TYPE (HCC): ICD-10-CM

## 2022-04-21 DIAGNOSIS — M25.571 RIGHT ANKLE PAIN, UNSPECIFIED CHRONICITY: ICD-10-CM

## 2022-04-21 DIAGNOSIS — M79.675 PAIN OF TOE OF LEFT FOOT: ICD-10-CM

## 2022-04-21 DIAGNOSIS — S81.812D LACERATION OF LEFT LOWER EXTREMITY, SUBSEQUENT ENCOUNTER: Primary | ICD-10-CM

## 2022-04-21 PROCEDURE — 99213 OFFICE O/P EST LOW 20 MIN: CPT | Performed by: FAMILY MEDICINE

## 2022-04-21 NOTE — PROGRESS NOTES
The patient should be afforded handicap parking at her residence  The patient has severe COPD, and chronic back pain  And peripheral neuropathy  Assessment/Plan:  Healing laceration of the left anterior tibial area right ankle sprain currently splinting left 2nd toe sprain currently splinting the patient was advised to continue splinting for an additional week  X-rays were reviewed no acute osseous injury was found        Problem List Items Addressed This Visit     None           There are no diagnoses linked to this encounter  No problem-specific Assessment & Plan notes found for this encounter  PHQ-2/9 Depression Screening            Body mass index is 23 78 kg/m²  BMI Counseling: Body mass index is 23 78 kg/m²  The BMI   Subjective:      Patient ID: Moisés Thomas is a 47 y o  female  Patient presents for follow-up of a fall injury      The following portions of the patient's history were reviewed and updated as appropriate:   She has a past medical history of Allergies, Anxiety, Asthma, Bereavement, Bipolar disorder ([address] ), Carpal tunnel syndrome, bilateral, Chronic bronchitis ([address] ), Chronic constipation, Chronic low back pain, Chronic neck pain, COPD (chronic obstructive pulmonary disease) ([address] ), Costochondritis, DDD (degenerative disc disease), cervical, Degenerative joint disease of left hip, Depression, Diverticulitis, DJD (degenerative joint disease) of knee, DJD (degenerative joint disease), ankle and foot, Edema, Fatigue, GABE (generalized anxiety disorder), GERD (gastroesophageal reflux disease), Hernia, inguinal, Hernia, umbilical, Hyperlipidemia, Hypertension, IBS (irritable bowel syndrome), Insomnia, Lower GI bleed, Migraine, Neuropathy, PTSD (post-traumatic stress disorder), Raynaud disease, Scalp psoriasis, Situational depression, and Tobacco dependence  ,  does not have any pertinent problems on file  ,   has a past surgical history that includes Chest tube insertion; DXA procedure(historical) (09/04/2009 & 8/24/2005); Mammo (historical) (02/14/2014); Hernia repair; and Cryotherapy  ,  family history includes Anxiety disorder in her other; Arthritis in her other; Cancer in her other; Depression in her other; Diabetes in her other; Heart disease in her other  ,   reports that she has been smoking  She has never used smokeless tobacco  She reports current alcohol use  She reports previous drug use ,  is allergic to erythromycin     Current Outpatient Medications   Medication Sig Dispense Refill    atorvastatin (LIPITOR) 10 mg tablet Take 1 tablet (10 mg total) by mouth daily 90 tablet 1    benzonatate (TESSALON) 200 MG capsule Take 1 capsule (200 mg total) by mouth 3 (three) times a day as needed for cough 30 capsule 2    calcium carbonate (OS-ONELIA) 600 MG tablet Take 1 tablet (600 mg total) by mouth 2 (two) times a day with meals 180 tablet 1    diclofenac (VOLTAREN) 75 mg EC tablet Take 1 tablet (75 mg total) by mouth 2 (two) times a day 60 tablet 5    doxycycline monohydrate (MONODOX) 100 mg capsule Take 1 capsule (100 mg total) by mouth 2 (two) times a day for 10 days 20 capsule 1    Elastic Bandages & Supports (Ankle Splint/Canvas Medium) MISC Use continuous for 7 days Right ankle splint for sprain 1 each 0    fluticasone-umeclidinium-vilanterol (Trelegy Ellipta) 100-62 5-25 MCG/INH inhaler Inhale 1 puff daily Rinse mouth after use  1 each 0    fluticasone-umeclidinium-vilanterol (Trelegy Ellipta) 200-62 5-25 MCG/INH AEPB inhaler Inhale 1 puff daily Rinse mouth after use   1 each 5    HYDROcodone-acetaminophen (NORCO) 7 5-325 mg per tablet Take 1 tablet by mouth every 6 (six) hours as needed for pain Max Daily Amount: 3 tablets 90 tablet 0    hydrOXYzine pamoate (VISTARIL) 50 mg capsule Take 1 capsule (50 mg total) by mouth 3 (three) times a day as needed for itching 30 capsule 5    loratadine (CLARITIN) 10 mg tablet Take 1 tablet (10 mg total) by mouth daily 90 tablet 1    methocarbamol (ROBAXIN) 750 mg tablet Take 1 tablet (750 mg total) by mouth 4 (four) times a day 120 tablet 3    methylPREDNISolone 4 MG tablet therapy pack Use as directed on package (Patient not taking: Reported on 4/21/2022 ) 21 each 0    methylPREDNISolone 4 MG tablet therapy pack Use as directed on package (Patient not taking: Reported on 4/21/2022 ) 21 each 0    mirtazapine (REMERON) 45 MG tablet Take 1 tablet (45 mg total) by mouth daily at bedtime 30 tablet 2    Multiple Vitamins-Minerals (MULTIVITAMIN ADULT PO) Take by mouth      nicotine (NICODERM CQ) 21 mg/24 hr TD 24 hr patch Place 1 patch on the skin every 24 hours (Patient taking differently: Place 1 patch on the skin every 24 hours Uses off and on ) 28 patch 11    Promethazine-DM (PHENERGAN-DM) 6 25-15 mg/5 mL oral syrup Take 5 mL by mouth 4 (four) times a day as needed for cough (Patient not taking: Reported on 4/21/2022 ) 180 mL 1    pyridoxine (VITAMIN B6) 50 mg tablet Take 1 tablet (50 mg total) by mouth 3 (three) times a day 90 tablet 5    QUEtiapine (SEROquel) 100 mg tablet Take 1 tablet (100 mg total) by mouth daily at bedtime 90 tablet 1    topiramate (TOPAMAX) 50 MG tablet Take 50 mg by mouth 2 (two) times a day (Patient not taking: Reported on 3/11/2022 )      TURMERIC PO Take 1 tablet by mouth       No current facility-administered medications for this visit  Review of Systems   Constitutional: Negative for chills and fever  HENT: Negative for ear pain and sore throat  Eyes: Negative for pain and visual disturbance  Respiratory: Positive for shortness of breath and wheezing  Negative for cough  Cardiovascular: Negative for chest pain and palpitations  Gastrointestinal: Negative for abdominal pain and vomiting  Genitourinary: Negative for dysuria and hematuria  Musculoskeletal: Positive for back pain and gait problem  Negative for arthralgias          Right ankle pain and left 2nd toe pain   Skin: Negative for color change and rash  Neurological: Negative for seizures and syncope  All other systems reviewed and are negative  Objective:    /68   Pulse 68   Temp 98 1 °F (36 7 °C)   Resp 20   Ht 5' 2" (1 575 m)   Wt 59 kg (130 lb)   BMI 23 78 kg/m²   Body mass index is 23 78 kg/m²  Physical Exam  Constitutional:       Appearance: She is well-developed  HENT:      Head: Normocephalic  Eyes:      Pupils: Pupils are equal, round, and reactive to light  Cardiovascular:      Rate and Rhythm: Normal rate and regular rhythm  Heart sounds: Normal heart sounds  Pulmonary:      Effort: Pulmonary effort is normal       Breath sounds: Normal breath sounds  Abdominal:      General: Bowel sounds are normal       Palpations: Abdomen is soft  Tenderness: There is no abdominal tenderness  Musculoskeletal:      Cervical back: Normal range of motion  Comments: Ecchymosis of the 2nd toe of the left foot edema of the right lateral malleolus   Skin:     General: Skin is warm  Comments: Laceration of left anterior tibial area healing well   Neurological:      Mental Status: She is alert and oriented to person, place, and time

## 2022-05-10 DIAGNOSIS — F33.41 RECURRENT MAJOR DEPRESSIVE DISORDER, IN PARTIAL REMISSION (HCC): ICD-10-CM

## 2022-05-11 RX ORDER — MIRTAZAPINE 45 MG/1
TABLET, FILM COATED ORAL
Qty: 30 TABLET | Refills: 2 | Status: SHIPPED | OUTPATIENT
Start: 2022-05-11 | End: 2022-08-09 | Stop reason: SDUPTHER

## 2022-05-16 ENCOUNTER — OFFICE VISIT (OUTPATIENT)
Dept: FAMILY MEDICINE CLINIC | Facility: CLINIC | Age: 52
End: 2022-05-16
Payer: COMMERCIAL

## 2022-05-16 VITALS
BODY MASS INDEX: 23.74 KG/M2 | TEMPERATURE: 97.7 F | WEIGHT: 129 LBS | RESPIRATION RATE: 20 BRPM | HEART RATE: 68 BPM | HEIGHT: 62 IN | DIASTOLIC BLOOD PRESSURE: 84 MMHG | SYSTOLIC BLOOD PRESSURE: 126 MMHG

## 2022-05-16 DIAGNOSIS — G89.4 CHRONIC PAIN SYNDROME: ICD-10-CM

## 2022-05-16 DIAGNOSIS — J44.9 CHRONIC OBSTRUCTIVE PULMONARY DISEASE, UNSPECIFIED COPD TYPE (HCC): ICD-10-CM

## 2022-05-16 DIAGNOSIS — G89.29 CHRONIC NECK AND BACK PAIN: Primary | ICD-10-CM

## 2022-05-16 DIAGNOSIS — M54.2 CHRONIC NECK AND BACK PAIN: Primary | ICD-10-CM

## 2022-05-16 DIAGNOSIS — F17.200 TOBACCO USE DISORDER: ICD-10-CM

## 2022-05-16 DIAGNOSIS — Z79.891 OPIOID USE AGREEMENT EXISTS: ICD-10-CM

## 2022-05-16 DIAGNOSIS — M54.9 CHRONIC NECK AND BACK PAIN: Primary | ICD-10-CM

## 2022-05-16 DIAGNOSIS — J40 BRONCHITIS: ICD-10-CM

## 2022-05-16 DIAGNOSIS — G89.29 CHRONIC BILATERAL LOW BACK PAIN WITHOUT SCIATICA: ICD-10-CM

## 2022-05-16 DIAGNOSIS — M54.50 CHRONIC BILATERAL LOW BACK PAIN WITHOUT SCIATICA: ICD-10-CM

## 2022-05-16 PROCEDURE — 99214 OFFICE O/P EST MOD 30 MIN: CPT | Performed by: FAMILY MEDICINE

## 2022-05-16 RX ORDER — DICLOFENAC SODIUM 75 MG/1
75 TABLET, DELAYED RELEASE ORAL 2 TIMES DAILY
Qty: 60 TABLET | Refills: 5 | Status: SHIPPED | OUTPATIENT
Start: 2022-05-16

## 2022-05-16 RX ORDER — HYDROCODONE BITARTRATE AND ACETAMINOPHEN 7.5; 325 MG/1; MG/1
1 TABLET ORAL EVERY 6 HOURS PRN
Qty: 90 TABLET | Refills: 0 | Status: SHIPPED | OUTPATIENT
Start: 2022-05-16 | End: 2022-06-14 | Stop reason: SDUPTHER

## 2022-05-16 RX ORDER — DOXYCYCLINE HYCLATE 100 MG/1
100 CAPSULE ORAL EVERY 12 HOURS SCHEDULED
Qty: 28 CAPSULE | Refills: 0 | Status: SHIPPED | OUTPATIENT
Start: 2022-05-16 | End: 2022-05-30

## 2022-05-16 RX ORDER — DEXTROMETHORPHAN HYDROBROMIDE AND PROMETHAZINE HYDROCHLORIDE 15; 6.25 MG/5ML; MG/5ML
5 SOLUTION ORAL 4 TIMES DAILY PRN
Qty: 180 ML | Refills: 1 | Status: SHIPPED | OUTPATIENT
Start: 2022-05-16

## 2022-05-16 RX ORDER — METHYLPREDNISOLONE 4 MG/1
TABLET ORAL
Qty: 21 EACH | Refills: 0 | Status: SHIPPED | OUTPATIENT
Start: 2022-05-16 | End: 2022-06-14 | Stop reason: ALTCHOICE

## 2022-05-16 NOTE — PROGRESS NOTES
Assessment/Plan chronic neck low back knee and left foot pain treated with opioid therapy  Opioid therapy is used in conjunction with Voltaren  The PDMP has been reviewed no issues found no evidence of divergence or abuse    Severe COPD with acute bronchitis will provide Medrol Dosepak doxycycline and promethazine DM    Tobacco use disorder the patient has been the nicotine replacement failure Wellbutrin and Chantix and cannot be used due to her depressive disorder and bipolar disorder currently in remission     Problem List Items Addressed This Visit        Other    Chronic bilateral low back pain without sciatica    Relevant Medications    diclofenac (VOLTAREN) 75 mg EC tablet    Opioid use agreement exists    Tobacco use disorder      Other Visit Diagnoses     Chronic neck and back pain    -  Primary    Relevant Medications    HYDROcodone-acetaminophen (NORCO) 7 5-325 mg per tablet    Bronchitis        Relevant Medications    methylPREDNISolone 4 MG tablet therapy pack    doxycycline hyclate (VIBRAMYCIN) 100 mg capsule    Promethazine-DM (PHENERGAN-DM) 6 25-15 mg/5 mL oral syrup    Chronic obstructive pulmonary disease, unspecified COPD type (HCC)        Relevant Medications    methylPREDNISolone 4 MG tablet therapy pack    Promethazine-DM (PHENERGAN-DM) 6 25-15 mg/5 mL oral syrup    Chronic pain syndrome             Treatment Plan: To continue opioid therapy to maintain function    Treatment Goals: To maximize function    Opiate risks  There are risks associated with opioid medications, including dependence, addiction and tolerance  The patient understands and agrees to use these medications only as prescribed  Potential side effects of the medications include, but are not limited to, constipation, drowsiness, addiction, impaired judgment and risk of fatal overdose if not taken as prescribed  The patient was warned against driving while taking sedation medications  Sharing medications is a felony   At this point in time, the patient is showing no signs of addiction, abuse, diversion or suicidal ideation  Patient is taking concurrent muscle relaxers  Has been counseled on the risks of taking opioids and muscle relaxers including sedation, increased fall risk, dizziness, addictive potential and death  Patient has a high risk condition (age > 72, CANDIS, renal or hepatic impairment, mental health condition, use of alcohol or other substances)  Has been counseled on the specific risks of taking opioids with these conditions and the increased risks including including sedation, increased fall risk, dizziness, addictive potential and death  Opioid agreement  Pain management agreement was reviewed with patient and signed/updated during visit          Subjective         Current pain description: The current pain is at the worst 8 daily it is at least to 5 the pain affects neck lower back left knee ankles and left foot    Functional status: Patient currently is on disability her walking ability is an 8    Goals of care: To maintain function    Screening Tools/Assessments:    PHQ-2/9:  Last PHQ-2 score: 0 (Last PHQ-2 date: 4/14/2022)  Last PHQ-9 score: 15 (Last PHQ-9 date: 6/8/2020)    Brief Pain Inventory (BPI):  1) Throughout our lives, most of us have had pain from time to time (such as minor headaches, sprains, and toothaches)  Have you had pain other than these everyday kinds of pain today? Yes  2) Where is your pain located? neck, left lower back, left knee, bilat ankles, left foot  3) Rate your pain at its worst in the last 24 hours: 8  4) Rate your pain at its least in the last 24 hours: 5  5) Rate your average level of pain: 7  6) Rate your pain right now: 7  7) What treatments or medications are you receiving for your pain?  Norco, muscle relaxers, osteo biflex  8) In the past 24 hours, how much relief have pain treatments or medication provided? 40%  9) During the past 24 hours, pain has interfered with your:     A) General activity: 8     B) Mood: 7     C) Walking ability: 8     D) Normal work (work outside the home & housework): 8     E) Relations with other people: 5     F) Sleep: 8     G) Enjoyment of life: 8    Drug Screen:  Date of last drug screen: 9/18/2021    Opioid agreement:  Active Opioid agreement on file?: Yes    Opioid agreement signed date: 8/10/2021  Opioid agreement expiration date: 8/10/2022    Naloxone:  Currently prescribed Naloxone (Narcan): No      Patient presents with cough productive of yellowish phlegm she has bronchitis on top of severe emphysema    Pain Medications             diclofenac (VOLTAREN) 75 mg EC tablet Take 1 tablet (75 mg total) by mouth in the morning and 1 tablet (75 mg total) in the evening      HYDROcodone-acetaminophen (NORCO) 7 5-325 mg per tablet Take 1 tablet by mouth every 6 (six) hours as needed for pain Max Daily Amount: 3 tablets    methylPREDNISolone 4 MG tablet therapy pack Use as directed on package    methocarbamol (ROBAXIN) 750 mg tablet Take 1 tablet (750 mg total) by mouth 4 (four) times a day    methylPREDNISolone 4 MG tablet therapy pack Use as directed on package    mirtazapine (REMERON) 45 MG tablet take 1 tablet by mouth at bedtime    QUEtiapine (SEROquel) 100 mg tablet Take 1 tablet (100 mg total) by mouth daily at bedtime    topiramate (TOPAMAX) 50 MG tablet Take 50 mg by mouth 2 (two) times a day         Outpatient Morphine Milligram Equivalents Per Day     5/16/22 and after 22 5 MME/Day    Order Name Dose Route Frequency Maximum MME/Day     HYDROcodone-acetaminophen (NORCO) 7 5-325 mg per tablet 1 tablet Oral Every 6 hours PRN 22 5 MME/Day    Total Potential Morphine Milligram Equivalents Per Day 22 5 MME/Day    Calculation Information        HYDROcodone-acetaminophen (NORCO) 7 5-325 mg per tablet    HYDROcodone-acetaminophen 7 5-325 mg Tabs: maximum daily dose of 22 5 mg of opioid in combo * morphine equivalence factor of 1 = 22 5 MME/Day PDMP Review       Value Time User    PDMP Reviewed  Yes 5/16/2022  2:04 PM Gus Vines DO         Review of Systems   Constitutional: Negative for chills and fever  HENT: Negative for ear pain and sore throat  Eyes: Negative for pain and visual disturbance  Respiratory: Positive for cough, shortness of breath and wheezing  Cardiovascular: Negative for chest pain and palpitations  Gastrointestinal: Negative for abdominal pain and vomiting  Genitourinary: Negative for dysuria and hematuria  Musculoskeletal: Positive for arthralgias, back pain and neck pain  Skin: Negative for color change and rash  Neurological: Negative for seizures and syncope  All other systems reviewed and are negative  Objective     /84   Pulse 68   Temp 97 7 °F (36 5 °C)   Resp 20   Ht 5' 2" (1 575 m)   Wt 58 5 kg (129 lb)   BMI 23 59 kg/m²     Physical Exam  Vitals and nursing note reviewed  Constitutional:       General: She is not in acute distress  Appearance: She is well-developed  HENT:      Head: Normocephalic and atraumatic  Eyes:      Conjunctiva/sclera: Conjunctivae normal    Cardiovascular:      Rate and Rhythm: Normal rate and regular rhythm  Heart sounds: No murmur heard  Pulmonary:      Effort: Pulmonary effort is normal  No respiratory distress  Breath sounds: Rhonchi present  Abdominal:      Palpations: Abdomen is soft  Tenderness: There is no abdominal tenderness  Musculoskeletal:      Cervical back: Neck supple  Comments: Decreased range of motion of the cervical spine with paraspinal tenderness lumbar paraspinal spasm with bilateral SI tenderness osteoarthritic deformity of the knees bilaterally and posttraumatic deformity of the left ankle and foot   Skin:     General: Skin is warm and dry  Neurological:      Mental Status: She is alert           Gus Vines DO

## 2022-05-16 NOTE — PATIENT INSTRUCTIONS

## 2022-05-23 ENCOUNTER — TELEPHONE (OUTPATIENT)
Dept: FAMILY MEDICINE CLINIC | Facility: CLINIC | Age: 52
End: 2022-05-23

## 2022-05-23 DIAGNOSIS — R11.0 NAUSEA: Primary | ICD-10-CM

## 2022-05-23 RX ORDER — PROCHLORPERAZINE MALEATE 5 MG/1
5 TABLET ORAL EVERY 6 HOURS PRN
Qty: 30 TABLET | Refills: 0 | Status: SHIPPED | OUTPATIENT
Start: 2022-05-23 | End: 2022-06-15

## 2022-05-23 RX ORDER — PROCHLORPERAZINE MALEATE 5 MG/1
5 TABLET ORAL EVERY 6 HOURS PRN
Qty: 30 TABLET | Refills: 0 | Status: CANCELLED | OUTPATIENT
Start: 2022-05-23

## 2022-05-23 NOTE — TELEPHONE ENCOUNTER
Patient states that she has nausea daily and it was discussed at her prior visit if it was to continue she should call office and ask to have compazine ordered, she was wondering if you could order that for her

## 2022-05-31 DIAGNOSIS — E78.2 MIXED HYPERLIPIDEMIA: ICD-10-CM

## 2022-05-31 RX ORDER — ATORVASTATIN CALCIUM 10 MG/1
TABLET, FILM COATED ORAL
Qty: 30 TABLET | Refills: 0 | Status: SHIPPED | OUTPATIENT
Start: 2022-05-31 | End: 2022-07-12 | Stop reason: SDUPTHER

## 2022-06-10 DIAGNOSIS — Z88.9 HISTORY OF SEASONAL ALLERGIES: ICD-10-CM

## 2022-06-10 RX ORDER — LORATADINE 10 MG/1
TABLET ORAL
Qty: 90 TABLET | Refills: 1 | Status: SHIPPED | OUTPATIENT
Start: 2022-06-10

## 2022-06-14 ENCOUNTER — OFFICE VISIT (OUTPATIENT)
Dept: FAMILY MEDICINE CLINIC | Facility: CLINIC | Age: 52
End: 2022-06-14
Payer: COMMERCIAL

## 2022-06-14 VITALS
RESPIRATION RATE: 20 BRPM | WEIGHT: 126 LBS | HEIGHT: 62 IN | TEMPERATURE: 97.2 F | DIASTOLIC BLOOD PRESSURE: 74 MMHG | SYSTOLIC BLOOD PRESSURE: 126 MMHG | HEART RATE: 84 BPM | BODY MASS INDEX: 23.19 KG/M2

## 2022-06-14 DIAGNOSIS — Z00.00 ANNUAL PHYSICAL EXAM: Primary | ICD-10-CM

## 2022-06-14 DIAGNOSIS — E78.2 MIXED HYPERLIPIDEMIA: ICD-10-CM

## 2022-06-14 DIAGNOSIS — F32.A ANXIETY AND DEPRESSION: ICD-10-CM

## 2022-06-14 DIAGNOSIS — F41.9 ANXIETY AND DEPRESSION: ICD-10-CM

## 2022-06-14 DIAGNOSIS — E55.9 VITAMIN D DEFICIENCY: ICD-10-CM

## 2022-06-14 DIAGNOSIS — Z11.4 SCREENING FOR HIV (HUMAN IMMUNODEFICIENCY VIRUS): ICD-10-CM

## 2022-06-14 DIAGNOSIS — J45.40 MODERATE PERSISTENT ASTHMA, UNSPECIFIED WHETHER COMPLICATED: ICD-10-CM

## 2022-06-14 DIAGNOSIS — F17.200 TOBACCO USE DISORDER: ICD-10-CM

## 2022-06-14 DIAGNOSIS — Z12.31 ENCOUNTER FOR SCREENING MAMMOGRAM FOR BREAST CANCER: ICD-10-CM

## 2022-06-14 DIAGNOSIS — Z11.59 NEED FOR HEPATITIS C SCREENING TEST: ICD-10-CM

## 2022-06-14 DIAGNOSIS — R11.0 NAUSEA: ICD-10-CM

## 2022-06-14 DIAGNOSIS — J40 BRONCHITIS: ICD-10-CM

## 2022-06-14 DIAGNOSIS — M54.9 CHRONIC NECK AND BACK PAIN: ICD-10-CM

## 2022-06-14 DIAGNOSIS — G89.29 CHRONIC NECK AND BACK PAIN: ICD-10-CM

## 2022-06-14 DIAGNOSIS — M54.2 CHRONIC NECK AND BACK PAIN: ICD-10-CM

## 2022-06-14 DIAGNOSIS — J44.9 CHRONIC OBSTRUCTIVE PULMONARY DISEASE, UNSPECIFIED COPD TYPE (HCC): ICD-10-CM

## 2022-06-14 PROCEDURE — 99396 PREV VISIT EST AGE 40-64: CPT | Performed by: FAMILY MEDICINE

## 2022-06-14 PROCEDURE — 99214 OFFICE O/P EST MOD 30 MIN: CPT | Performed by: FAMILY MEDICINE

## 2022-06-14 RX ORDER — BENZONATATE 200 MG/1
200 CAPSULE ORAL 3 TIMES DAILY PRN
Qty: 30 CAPSULE | Refills: 2 | Status: SHIPPED | OUTPATIENT
Start: 2022-06-14

## 2022-06-14 RX ORDER — HYDROCODONE BITARTRATE AND ACETAMINOPHEN 7.5; 325 MG/1; MG/1
1 TABLET ORAL EVERY 6 HOURS PRN
Qty: 90 TABLET | Refills: 0 | Status: SHIPPED | OUTPATIENT
Start: 2022-06-14 | End: 2022-07-13 | Stop reason: SDUPTHER

## 2022-06-14 RX ORDER — FLUTICASONE FUROATE, UMECLIDINIUM BROMIDE AND VILANTEROL TRIFENATATE 200; 62.5; 25 UG/1; UG/1; UG/1
1 POWDER RESPIRATORY (INHALATION) DAILY
Qty: 1 EACH | Refills: 5 | Status: SHIPPED | OUTPATIENT
Start: 2022-06-14 | End: 2022-07-14

## 2022-06-14 RX ORDER — DOXYCYCLINE HYCLATE 100 MG/1
100 CAPSULE ORAL EVERY 12 HOURS SCHEDULED
Qty: 28 CAPSULE | Refills: 0 | Status: SHIPPED | OUTPATIENT
Start: 2022-06-14 | End: 2022-06-28

## 2022-06-14 RX ORDER — METHYLPREDNISOLONE 4 MG/1
TABLET ORAL
Qty: 21 EACH | Refills: 0 | Status: SHIPPED | OUTPATIENT
Start: 2022-06-14 | End: 2022-08-09

## 2022-06-14 NOTE — PROGRESS NOTES
ADULT ANNUAL [id number] [address] PRIMARY CARE    NAME: Stone Fajardo  AGE: 47 y o  SEX: female  : 1970     DATE: 2022     Assessment and Plan:  Acute on chronic bronchitis with severe COPD and patient continues to smoke daily will provide doxycycline 100 mg b i d  For 2 weeks along with a Medrol Dosepak  Chest x-ray laboratory mycoplasma panel and sputum have been ordered    Chronic low back and knee and left foot pain treated with opioid therapy  Opioid therapy is used in conjunction with Voltaren on nonsteroidal anti-inflammatory  The PDMP has been reviewed no issues found no evidence of divergence or abuse    Mixed hyperlipidemia currently on atorvastatin 10 mg will reassess a lipid profile    COPD with asthmatic component patient uses trilogy as a maintenance a inhaler and albuterol as rescue inhaler uses the albuterol daily    Anxiety with depression the patient utilizes Remeron 45 mg daily and Seroquel 100 mg at bedtime    Patient defers screening test of CRC, Cervical, and osteoporosis  The patient defers cervical cancer screening states she is agreeable to on mammogram however defers colon cancer screening states she had colonoscopy performed in [place] she is not sure how long ago but states she was told was good for 10 years she will look into it    The patient is agreeable to low-dose CT of the chest for lung cancer screening         Problem List Items Addressed This Visit        Other    Tobacco use disorder    Mixed hyperlipidemia      Other Visit Diagnoses     Annual physical exam    -  Primary    Relevant Orders    Lipid Panel with Direct LDL reflex    Vitamin D 25 hydroxy    Lipid panel    Comprehensive metabolic panel    CBC and Platelet    TSH, 3rd generation    Bronchitis        Relevant Medications    benzonatate (TESSALON) 200 MG capsule    fluticasone-umeclidinium-vilanterol (Trelegy Ellipta) 200-62 5-25 MCG/INH AEPB inhaler    doxycycline hyclate (VIBRAMYCIN) 100 mg capsule    methylPREDNISolone 4 MG tablet therapy pack    Other Relevant Orders    XR chest pa & lateral    CBC and differential    Mycoplasma Pneumoniae AB, IgG/IgM    Sputum culture and Gram stain    Moderate persistent asthma, unspecified whether complicated        Relevant Medications    fluticasone-umeclidinium-vilanterol (Trelegy Ellipta) 200-62 5-25 MCG/INH AEPB inhaler    Chronic obstructive pulmonary disease, unspecified COPD type (HCC)        Relevant Medications    benzonatate (TESSALON) 200 MG capsule    fluticasone-umeclidinium-vilanterol (Trelegy Ellipta) 200-62 5-25 MCG/INH AEPB inhaler    methylPREDNISolone 4 MG tablet therapy pack    Chronic neck and back pain        Relevant Medications    HYDROcodone-acetaminophen (NORCO) 7 5-325 mg per tablet    Anxiety and depression        Encounter for screening mammogram for breast cancer        Relevant Orders    Mammo screening bilateral w 3d & cad    Screening for HIV (human immunodeficiency virus)        Relevant Orders    HIV 1/2 Antigen/Antibody (4th Generation) w Reflex [facility]    Need for hepatitis C screening test        Relevant Orders    Hepatitis C antibody    Vitamin D deficiency        Relevant Orders    Vitamin D 25 hydroxy          Immunizations and preventive care screenings were discussed with patient today  Appropriate education was printed on patient's after visit summary  Counseling:   Alcohol/drug use: discussed moderation in alcohol intake, the recommendations for healthy alcohol use, and avoidance of illicit drug use   Dental Health: discussed importance of regular tooth brushing, flossing, and dental visits   Injury prevention: discussed safety/seat belts, safety helmets, smoke detectors, carbon dioxide detectors, and smoking near bedding or upholstery     Sexual health: discussed sexually transmitted diseases, partner selection, use of condoms, avoidance of unintended pregnancy, and contraceptive alternatives  · Exercise: the importance of regular exercise/physical activity was discussed  Recommend exercise 3-5 times per week for at least 30 minutes  Return in about 4 weeks (around 7/12/2022)  Chief Complaint:     Chief Complaint   Patient presents with    Annual Exam    Medication Refill     Patient also needs Narcan Rx for Opioid Agreement      History of Present Illness:     Adult Annual Physical   Patient here for a comprehensive physical exam  The patient reports no problems  Diet and Physical Activity  · Diet/Nutrition: poor diet  · Exercise: no formal exercise  Depression Screening  PHQ-2/9 Depression Screening         General Health  · Sleep: sleeps poorly and gets 7-8 hours of sleep on average  · Hearing: normal - bilateral   · Vision: no vision problems  · Dental: no dental visits for >1 year  /GYN Health  · Patient is: perimenopausal  · Last menstrual period:irrEGULAR  · Contraceptive method: none  Review of Systems:     Review of Systems   Constitutional: Negative for chills and fever  HENT: Negative for ear pain and sore throat  Eyes: Negative for pain and visual disturbance  Respiratory: Positive for cough, shortness of breath and wheezing  Cardiovascular: Negative for chest pain and palpitations  Gastrointestinal: Positive for nausea  Negative for abdominal pain and vomiting  Genitourinary: Negative for dysuria and hematuria  Musculoskeletal: Positive for back pain and neck pain  Negative for arthralgias  Skin: Negative for color change and rash  Neurological: Negative for seizures and syncope  All other systems reviewed and are negative       Past Medical History:     Past Medical History:   Diagnosis Date    Allergies     Anxiety     Asthma     Bereavement     Bipolar disorder ([facility] [address] )     Carpal tunnel syndrome, bilateral     Chronic bronchitis (HCC)     Chronic constipation     Chronic low back pain     Chronic neck pain     COPD (chronic obstructive pulmonary disease) (HCC)     Costochondritis     DDD (degenerative disc disease), cervical     Degenerative joint disease of left hip     Depression     Diverticulitis     DJD (degenerative joint disease) of knee     DJD (degenerative joint disease), ankle and foot     Edema     Fatigue     GABE (generalized anxiety disorder)     GERD (gastroesophageal reflux disease)     Hernia, inguinal     Hernia, umbilical     Hyperlipidemia     Hypertension     IBS (irritable bowel syndrome)     Insomnia     Lower GI bleed     Migraine     Neuropathy     PTSD (post-traumatic stress disorder)     Raynaud disease     Scalp psoriasis     Situational depression     Tobacco dependence       Past Surgical History:     Past Surgical History:   Procedure Laterality Date    CHEST TUBE INSERTION      CRYOTHERAPY      Plantar wart    DXA PROCEDURE (HISTORICAL)  09/04/2009 & 8/24/2005     [facility]    HERNIA REPAIR      Umbilical    MAMMO (HISTORICAL)  02/14/2014     [place]      Social History:     Social History     Socioeconomic History    Marital status: Single     Spouse name: None    Number of children: None    Years of education: None    Highest education level: None   Occupational History    None   Tobacco Use    Smoking status: Current Every Day Smoker    Smokeless tobacco: Never Used   Vaping Use    Vaping Use: Never used   Substance and Sexual Activity    Alcohol use: Yes     Comment: Occasionally    Drug use: Not Currently    Sexual activity: None   Other Topics Concern    None   Social History Narrative    Alcohol: No    Per paper chart     Social Determinants of Health     Financial Resource Strain: Not on file   Food Insecurity: Not on file   Transportation Needs: Not on file   Physical Activity: Not on file   Stress: Not on file   Social Connections: Not on file   Intimate Partner Violence: Not on file   Housing Stability: Not on file      Family History:     Family History   Problem Relation Age of Onset    Heart disease Other     Diabetes Other     Arthritis Other     Anxiety disorder Other     Depression Other     Cancer Other       Current Medications:     Current Outpatient Medications   Medication Sig Dispense Refill    benzonatate (TESSALON) 200 MG capsule Take 1 capsule (200 mg total) by mouth 3 (three) times a day as needed for cough 30 capsule 2    doxycycline hyclate (VIBRAMYCIN) 100 mg capsule Take 1 capsule (100 mg total) by mouth every 12 (twelve) hours for 14 days 28 capsule 0    fluticasone-umeclidinium-vilanterol (Trelegy Ellipta) 200-62 5-25 MCG/INH AEPB inhaler Inhale 1 puff daily Rinse mouth after use  1 each 5    HYDROcodone-acetaminophen (NORCO) 7 5-325 mg per tablet Take 1 tablet by mouth every 6 (six) hours as needed for pain Max Daily Amount: 3 tablets 90 tablet 0    methylPREDNISolone 4 MG tablet therapy pack Use as directed on package 21 each 0    atorvastatin (LIPITOR) 10 mg tablet take 1 tablet by mouth daily 30 tablet 0    calcium carbonate (OS-ONELIA) 600 MG tablet Take 1 tablet (600 mg total) by mouth 2 (two) times a day with meals 180 tablet 1    diclofenac (VOLTAREN) 75 mg EC tablet Take 1 tablet (75 mg total) by mouth in the morning and 1 tablet (75 mg total) in the evening   60 tablet 5    hydrOXYzine pamoate (VISTARIL) 50 mg capsule Take 1 capsule (50 mg total) by mouth 3 (three) times a day as needed for itching 30 capsule 5    loratadine (CLARITIN) 10 mg tablet take 1 tablet by mouth daily 90 tablet 1    methocarbamol (ROBAXIN) 750 mg tablet Take 1 tablet (750 mg total) by mouth 4 (four) times a day 120 tablet 3    mirtazapine (REMERON) 45 MG tablet take 1 tablet by mouth at bedtime 30 tablet 2    Multiple Vitamins-Minerals (MULTIVITAMIN ADULT PO) Take by mouth      nicotine (NICODERM CQ) 21 mg/24 hr TD 24 hr patch Place 1 patch on the skin every 24 hours (Patient taking differently: Place 1 patch on the skin every 24 hours Uses off and on ) 28 patch 11    prochlorperazine (COMPAZINE) 5 mg tablet Take 1 tablet (5 mg total) by mouth every 6 (six) hours as needed for nausea or vomiting 30 tablet 0    Promethazine-DM (PHENERGAN-DM) 6 25-15 mg/5 mL oral syrup Take 5 mL by mouth as needed in the morning and 5 mL as needed at noon and 5 mL as needed in the evening and 5 mL as needed before bedtime for cough  (Patient not taking: Reported on 6/14/2022) 180 mL 1    pyridoxine (VITAMIN B6) 50 mg tablet Take 1 tablet (50 mg total) by mouth 3 (three) times a day 90 tablet 5    QUEtiapine (SEROquel) 100 mg tablet Take 1 tablet (100 mg total) by mouth daily at bedtime 90 tablet 1    topiramate (TOPAMAX) 50 MG tablet Take 50 mg by mouth 2 (two) times a day (Patient not taking: Reported on 3/11/2022 )      TURMERIC PO Take 1 tablet by mouth       No current facility-administered medications for this visit  Allergies: Allergies   Allergen Reactions    Erythromycin GI Intolerance      Physical Exam:     /74   Pulse 84   Temp (!) 97 2 °F (36 2 °C)   Resp 20   Ht 5' 2" (1 575 m)   Wt 57 2 kg (126 lb)   BMI 23 05 kg/m²     Physical Exam  Vitals and nursing note reviewed  Constitutional:       General: She is not in acute distress  Appearance: She is well-developed  HENT:      Head: Normocephalic and atraumatic  Eyes:      Conjunctiva/sclera: Conjunctivae normal    Cardiovascular:      Rate and Rhythm: Normal rate and regular rhythm  Heart sounds: No murmur heard  Pulmonary:      Effort: Pulmonary effort is normal  No respiratory distress  Breath sounds: Wheezing and rhonchi present  Abdominal:      Palpations: Abdomen is soft  Tenderness: There is no abdominal tenderness  Musculoskeletal:      Cervical back: Neck supple        Comments: Decreased range of motion of the cervical spine with paraspinal tenderness lumbar paraspinal spasm with bilateral SI tenderness   Skin:     General: Skin is warm and dry  Neurological:      Mental Status: She is alert            DO MARY Buenrostro [facility] PRIMARY [facility]

## 2022-06-14 NOTE — PATIENT INSTRUCTIONS

## 2022-06-15 RX ORDER — PROCHLORPERAZINE MALEATE 5 MG/1
TABLET ORAL
Qty: 30 TABLET | Refills: 0 | Status: SHIPPED | OUTPATIENT
Start: 2022-06-15 | End: 2022-07-20

## 2022-07-12 ENCOUNTER — APPOINTMENT (OUTPATIENT)
Dept: LAB | Facility: HOSPITAL | Age: 52
End: 2022-07-12
Attending: FAMILY MEDICINE
Payer: COMMERCIAL

## 2022-07-12 ENCOUNTER — OFFICE VISIT (OUTPATIENT)
Dept: FAMILY MEDICINE CLINIC | Facility: CLINIC | Age: 52
End: 2022-07-12
Payer: COMMERCIAL

## 2022-07-12 VITALS
RESPIRATION RATE: 20 BRPM | SYSTOLIC BLOOD PRESSURE: 126 MMHG | HEIGHT: 62 IN | BODY MASS INDEX: 23.19 KG/M2 | TEMPERATURE: 97.7 F | HEART RATE: 84 BPM | DIASTOLIC BLOOD PRESSURE: 74 MMHG | WEIGHT: 126 LBS

## 2022-07-12 DIAGNOSIS — M77.31 CALCANEAL SPUR OF RIGHT FOOT: ICD-10-CM

## 2022-07-12 DIAGNOSIS — Z11.59 NEED FOR HEPATITIS C SCREENING TEST: ICD-10-CM

## 2022-07-12 DIAGNOSIS — E78.2 MIXED HYPERLIPIDEMIA: ICD-10-CM

## 2022-07-12 DIAGNOSIS — Z00.00 ANNUAL PHYSICAL EXAM: ICD-10-CM

## 2022-07-12 DIAGNOSIS — M54.2 CHRONIC NECK AND BACK PAIN: Primary | ICD-10-CM

## 2022-07-12 DIAGNOSIS — G89.29 CHRONIC NECK AND BACK PAIN: Primary | ICD-10-CM

## 2022-07-12 DIAGNOSIS — M25.552 LEFT HIP PAIN: ICD-10-CM

## 2022-07-12 DIAGNOSIS — J40 BRONCHITIS: ICD-10-CM

## 2022-07-12 DIAGNOSIS — Z79.51 CURRENT CHRONIC USE OF INHALED STEROID: ICD-10-CM

## 2022-07-12 DIAGNOSIS — M54.9 CHRONIC NECK AND BACK PAIN: Primary | ICD-10-CM

## 2022-07-12 DIAGNOSIS — Z11.4 SCREENING FOR HIV (HUMAN IMMUNODEFICIENCY VIRUS): ICD-10-CM

## 2022-07-12 LAB
HCV AB SER QL: NORMAL
TSH SERPL DL<=0.05 MIU/L-ACNC: 2.55 UIU/ML (ref 0.45–4.5)

## 2022-07-12 PROCEDURE — 84443 ASSAY THYROID STIM HORMONE: CPT

## 2022-07-12 PROCEDURE — 86803 HEPATITIS C AB TEST: CPT

## 2022-07-12 PROCEDURE — 99214 OFFICE O/P EST MOD 30 MIN: CPT | Performed by: FAMILY MEDICINE

## 2022-07-12 PROCEDURE — 87389 HIV-1 AG W/HIV-1&-2 AB AG IA: CPT

## 2022-07-12 PROCEDURE — 36415 COLL VENOUS BLD VENIPUNCTURE: CPT

## 2022-07-12 RX ORDER — HYDROCODONE BITARTRATE AND ACETAMINOPHEN 7.5; 325 MG/1; MG/1
1 TABLET ORAL EVERY 6 HOURS PRN
Qty: 90 TABLET | Refills: 0 | Status: CANCELLED | OUTPATIENT
Start: 2022-07-12

## 2022-07-12 RX ORDER — ATORVASTATIN CALCIUM 10 MG/1
TABLET, FILM COATED ORAL
Qty: 30 TABLET | Refills: 0 | OUTPATIENT
Start: 2022-07-12

## 2022-07-12 RX ORDER — ATORVASTATIN CALCIUM 10 MG/1
10 TABLET, FILM COATED ORAL DAILY
Qty: 90 TABLET | Refills: 1 | Status: SHIPPED | OUTPATIENT
Start: 2022-07-12

## 2022-07-12 RX ORDER — DEXTROMETHORPHAN HYDROBROMIDE AND PROMETHAZINE HYDROCHLORIDE 15; 6.25 MG/5ML; MG/5ML
5 SOLUTION ORAL 4 TIMES DAILY PRN
Qty: 180 ML | Refills: 1 | Status: SHIPPED | OUTPATIENT
Start: 2022-07-12

## 2022-07-12 RX ORDER — METHYLPREDNISOLONE 4 MG/1
TABLET ORAL
Qty: 21 EACH | Refills: 0 | Status: SHIPPED | OUTPATIENT
Start: 2022-07-12

## 2022-07-12 RX ORDER — CLINDAMYCIN HYDROCHLORIDE 300 MG/1
300 CAPSULE ORAL 3 TIMES DAILY
Qty: 21 CAPSULE | Refills: 0 | Status: SHIPPED | OUTPATIENT
Start: 2022-07-12 | End: 2022-07-19

## 2022-07-12 NOTE — PROGRESS NOTES
Assessment/Plan:  Chronic neck and low back pain bilateral knee pain bilateral foot pain treated with opioid therapy  Opioid therapy is used in conjunction with Voltaren  To the PDMP has been reviewed no issues found no evidence of divergence or abuse    Severe COPD with recurrent bronchitis will provide Medrol the form of a Dosepak, Cleocin 300 mg t i d  And promethazine DM    Tobacco use disorder the patient has been a nicotine replacement failure Wellbutrin and Chantix cannot be used due to depression disorder and bipolar disorder in remission    Patient complains of left hip pain will perform an x-ray of the left hip    Patient complains of right heel pain will refer to Podiatry the patient has history of heel spur    The patient has long history of chronic inhaled steroid use will perform a DEXA scan for osteoporosis    Insomnia is treated with hydroxyzine 50 mg at bedtime    Anxiety and depression are treated with Remeron and Seroquel    Hyperlipidemia the patient is on Lipitor 10 mg laboratories are pending    Problem List Items Addressed This Visit        Other    Mixed hyperlipidemia    Relevant Medications    atorvastatin (LIPITOR) 10 mg tablet      Other Visit Diagnoses     Chronic neck and back pain    -  Primary    Bronchitis        Relevant Medications    methylPREDNISolone 4 MG tablet therapy pack    clindamycin (CLEOCIN) 300 MG capsule    Promethazine-DM (PHENERGAN-DM) 6 25-15 mg/5 mL oral syrup    Calcaneal spur of right foot        Relevant Orders    Ambulatory Referral to Podiatry    Left hip pain        Relevant Orders    XR hip/pelv 2-3 vws left if performed    Current chronic use of inhaled steroid        Relevant Orders    DXA bone density spine hip and pelvis           Diagnoses and all orders for this visit:    Chronic neck and back pain    Bronchitis  -     methylPREDNISolone 4 MG tablet therapy pack; Use as directed on package  -     clindamycin (CLEOCIN) 300 MG capsule;  Take 1 capsule (300 mg total) by mouth 3 (three) times a day for 7 days  -     Promethazine-DM (PHENERGAN-DM) 6 25-15 mg/5 mL oral syrup; Take 5 mL by mouth 4 (four) times a day as needed for cough    Calcaneal spur of right foot  -     Ambulatory Referral to Podiatry; Future    Left hip pain  -     XR hip/pelv 2-3 vws left if performed; Future    Mixed hyperlipidemia  -     atorvastatin (LIPITOR) 10 mg tablet; Take 1 tablet (10 mg total) by mouth daily    Current chronic use of inhaled steroid  -     DXA bone density spine hip and pelvis; Future      No problem-specific Assessment & Plan notes found for this encounter  PHQ-2/9 Depression Screening            Body mass index is 23 05 kg/m²  BMI Counseling: Body mass index is 23 05 kg/m²  The BMI   Subjective:      Patient ID: Kate Addison is a 47 y o  female  Patient presents with a chief complaint of right heel pain and left hip pain  The patient also complains of cough productive of yellowish green phlegm      The following portions of the patient's history were reviewed and updated as appropriate:   She has a past medical history of Allergies, Anxiety, Asthma, Bereavement, Bipolar disorder ([address] ), Carpal tunnel syndrome, bilateral, Chronic bronchitis ([address] ), Chronic constipation, Chronic low back pain, Chronic neck pain, COPD (chronic obstructive pulmonary disease) ([address] ), Costochondritis, DDD (degenerative disc disease), cervical, Degenerative joint disease of left hip, Depression, Diverticulitis, DJD (degenerative joint disease) of knee, DJD (degenerative joint disease), ankle and foot, Edema, Fatigue, GABE (generalized anxiety disorder), GERD (gastroesophageal reflux disease), Hernia, inguinal, Hernia, umbilical, Hyperlipidemia, Hypertension, IBS (irritable bowel syndrome), Insomnia, Lower GI bleed, Migraine, Neuropathy, PTSD (post-traumatic stress disorder), Raynaud disease, Scalp psoriasis, Situational depression, and Tobacco dependence  ,  does not have any pertinent problems on file  ,   has a past surgical history that includes Chest tube insertion; DXA procedure(historical) (09/04/2009 & 8/24/2005); Mammo (historical) (02/14/2014); Hernia repair; and Cryotherapy  ,  family history includes Anxiety disorder in her other; Arthritis in her other; Cancer in her other; Depression in her other; Diabetes in her other; Heart disease in her other  ,   reports that she has been smoking  She has never used smokeless tobacco  She reports current alcohol use  She reports previous drug use ,  is allergic to erythromycin     Current Outpatient Medications   Medication Sig Dispense Refill    atorvastatin (LIPITOR) 10 mg tablet Take 1 tablet (10 mg total) by mouth daily 90 tablet 1    clindamycin (CLEOCIN) 300 MG capsule Take 1 capsule (300 mg total) by mouth 3 (three) times a day for 7 days 21 capsule 0    methylPREDNISolone 4 MG tablet therapy pack Use as directed on package 21 each 0    Promethazine-DM (PHENERGAN-DM) 6 25-15 mg/5 mL oral syrup Take 5 mL by mouth 4 (four) times a day as needed for cough 180 mL 1    benzonatate (TESSALON) 200 MG capsule Take 1 capsule (200 mg total) by mouth 3 (three) times a day as needed for cough 30 capsule 2    calcium carbonate (OS-ONELIA) 600 MG tablet Take 1 tablet (600 mg total) by mouth 2 (two) times a day with meals 180 tablet 1    diclofenac (VOLTAREN) 75 mg EC tablet Take 1 tablet (75 mg total) by mouth in the morning and 1 tablet (75 mg total) in the evening  60 tablet 5    fluticasone-umeclidinium-vilanterol (Trelegy Ellipta) 200-62 5-25 MCG/INH AEPB inhaler Inhale 1 puff daily Rinse mouth after use   1 each 5    HYDROcodone-acetaminophen (NORCO) 7 5-325 mg per tablet Take 1 tablet by mouth every 6 (six) hours as needed for pain Max Daily Amount: 3 tablets 90 tablet 0    hydrOXYzine pamoate (VISTARIL) 50 mg capsule Take 1 capsule (50 mg total) by mouth 3 (three) times a day as needed for itching 30 capsule 5    loratadine (CLARITIN) 10 mg tablet take 1 tablet by mouth daily 90 tablet 1    methocarbamol (ROBAXIN) 750 mg tablet Take 1 tablet (750 mg total) by mouth 4 (four) times a day 120 tablet 3    methylPREDNISolone 4 MG tablet therapy pack Use as directed on package 21 each 0    mirtazapine (REMERON) 45 MG tablet take 1 tablet by mouth at bedtime 30 tablet 2    Multiple Vitamins-Minerals (MULTIVITAMIN ADULT PO) Take by mouth      nicotine (NICODERM CQ) 21 mg/24 hr TD 24 hr patch Place 1 patch on the skin every 24 hours (Patient taking differently: Place 1 patch on the skin every 24 hours Uses off and on ) 28 patch 11    prochlorperazine (COMPAZINE) 5 mg tablet TAKE 1 TABLET BY MOUTH EVERY 6 HOURS AS NEEDED FR NAUSEA OR VOMITING 30 tablet 0    Promethazine-DM (PHENERGAN-DM) 6 25-15 mg/5 mL oral syrup Take 5 mL by mouth as needed in the morning and 5 mL as needed at noon and 5 mL as needed in the evening and 5 mL as needed before bedtime for cough  (Patient not taking: Reported on 6/14/2022) 180 mL 1    pyridoxine (VITAMIN B6) 50 mg tablet Take 1 tablet (50 mg total) by mouth 3 (three) times a day 90 tablet 5    QUEtiapine (SEROquel) 100 mg tablet Take 1 tablet (100 mg total) by mouth daily at bedtime 90 tablet 1    topiramate (TOPAMAX) 50 MG tablet Take 50 mg by mouth 2 (two) times a day (Patient not taking: Reported on 3/11/2022 )      TURMERIC PO Take 1 tablet by mouth       No current facility-administered medications for this visit  Review of Systems   Constitutional: Negative for chills and fever  HENT: Negative for ear pain and sore throat  Eyes: Negative for pain and visual disturbance  Respiratory: Positive for cough, shortness of breath and wheezing  Cardiovascular: Negative for chest pain and palpitations  Gastrointestinal: Negative for abdominal pain and vomiting  Genitourinary: Negative for dysuria and hematuria  Musculoskeletal: Positive for arthralgias and back pain     Skin: Negative for color change and rash  Neurological: Negative for seizures and syncope  All other systems reviewed and are negative  Objective:    /74   Pulse 84   Temp 97 7 °F (36 5 °C)   Resp 20   Ht 5' 2" (1 575 m)   Wt 57 2 kg (126 lb)   BMI 23 05 kg/m²   Body mass index is 23 05 kg/m²  Physical Exam  Constitutional:       Appearance: She is well-developed  HENT:      Head: Normocephalic  Eyes:      Pupils: Pupils are equal, round, and reactive to light  Cardiovascular:      Rate and Rhythm: Normal rate and regular rhythm  Heart sounds: Normal heart sounds  Pulmonary:      Effort: Pulmonary effort is normal       Breath sounds: Rhonchi present  Abdominal:      General: Bowel sounds are normal       Palpations: Abdomen is soft  Tenderness: There is no abdominal tenderness  Musculoskeletal:      Cervical back: Normal range of motion  Comments: Lumbar paraspinal spasm with SI tenderness bilaterally pain on deep palpation of the right heel pain on palpation of the left pelvic rim   Skin:     General: Skin is warm  Neurological:      Mental Status: She is alert and oriented to person, place, and time

## 2022-07-13 DIAGNOSIS — G89.29 CHRONIC NECK AND BACK PAIN: ICD-10-CM

## 2022-07-13 DIAGNOSIS — M54.2 CHRONIC NECK AND BACK PAIN: ICD-10-CM

## 2022-07-13 DIAGNOSIS — M54.9 CHRONIC NECK AND BACK PAIN: ICD-10-CM

## 2022-07-13 LAB — HIV 1+2 AB+HIV1 P24 AG SERPL QL IA: NORMAL

## 2022-07-13 RX ORDER — HYDROCODONE BITARTRATE AND ACETAMINOPHEN 7.5; 325 MG/1; MG/1
1 TABLET ORAL EVERY 6 HOURS PRN
Qty: 90 TABLET | Refills: 0 | Status: CANCELLED | OUTPATIENT
Start: 2022-07-13

## 2022-07-13 RX ORDER — HYDROCODONE BITARTRATE AND ACETAMINOPHEN 7.5; 325 MG/1; MG/1
1 TABLET ORAL EVERY 6 HOURS PRN
Qty: 90 TABLET | Refills: 0 | Status: SHIPPED | OUTPATIENT
Start: 2022-07-13 | End: 2022-08-09 | Stop reason: SDUPTHER

## 2022-07-13 NOTE — TELEPHONE ENCOUNTER
Medication Refill Request     HYDROcodone-acetaminophen (NORCO) 7 5-325 mg per tablet    Take 1 tablet by mouth every 6 (six) hours as needed for pain Max Daily Amount: 3 tablets,    Dispense: 90 tablet       Verified pharmacy   [x]  Verified ordering Provider   [x]  Does patient have enough for the next 3 days?  Yes [] No [x]

## 2022-07-19 ENCOUNTER — TELEPHONE (OUTPATIENT)
Dept: FAMILY MEDICINE CLINIC | Facility: CLINIC | Age: 52
End: 2022-07-19

## 2022-07-19 DIAGNOSIS — F33.41 RECURRENT MAJOR DEPRESSIVE DISORDER, IN PARTIAL REMISSION (HCC): ICD-10-CM

## 2022-07-20 RX ORDER — QUETIAPINE FUMARATE 100 MG/1
TABLET, FILM COATED ORAL
Qty: 90 TABLET | Refills: 1 | Status: SHIPPED | OUTPATIENT
Start: 2022-07-20

## 2022-07-25 ENCOUNTER — TELEPHONE (OUTPATIENT)
Dept: PODIATRY | Facility: CLINIC | Age: 52
End: 2022-07-25

## 2022-08-09 ENCOUNTER — OFFICE VISIT (OUTPATIENT)
Dept: FAMILY MEDICINE CLINIC | Facility: CLINIC | Age: 52
End: 2022-08-09
Payer: COMMERCIAL

## 2022-08-09 VITALS
BODY MASS INDEX: 21.9 KG/M2 | SYSTOLIC BLOOD PRESSURE: 126 MMHG | RESPIRATION RATE: 20 BRPM | WEIGHT: 119 LBS | DIASTOLIC BLOOD PRESSURE: 84 MMHG | HEART RATE: 88 BPM | HEIGHT: 62 IN | TEMPERATURE: 97.8 F

## 2022-08-09 DIAGNOSIS — M54.9 CHRONIC NECK AND BACK PAIN: ICD-10-CM

## 2022-08-09 DIAGNOSIS — G89.29 CHRONIC NECK AND BACK PAIN: ICD-10-CM

## 2022-08-09 DIAGNOSIS — J01.01 ACUTE RECURRENT MAXILLARY SINUSITIS: Primary | ICD-10-CM

## 2022-08-09 DIAGNOSIS — M54.2 CHRONIC NECK AND BACK PAIN: ICD-10-CM

## 2022-08-09 DIAGNOSIS — F33.41 RECURRENT MAJOR DEPRESSIVE DISORDER, IN PARTIAL REMISSION (HCC): ICD-10-CM

## 2022-08-09 DIAGNOSIS — F41.9 ANXIETY: ICD-10-CM

## 2022-08-09 PROCEDURE — 99213 OFFICE O/P EST LOW 20 MIN: CPT | Performed by: FAMILY MEDICINE

## 2022-08-09 RX ORDER — CLINDAMYCIN HYDROCHLORIDE 300 MG/1
300 CAPSULE ORAL 4 TIMES DAILY
Qty: 40 CAPSULE | Refills: 0 | Status: SHIPPED | OUTPATIENT
Start: 2022-08-09 | End: 2022-08-19

## 2022-08-09 RX ORDER — HYDROCODONE BITARTRATE AND ACETAMINOPHEN 7.5; 325 MG/1; MG/1
1 TABLET ORAL EVERY 6 HOURS PRN
Qty: 90 TABLET | Refills: 0 | Status: SHIPPED | OUTPATIENT
Start: 2022-08-09 | End: 2022-09-14

## 2022-08-09 RX ORDER — HYDROXYZINE PAMOATE 50 MG/1
50 CAPSULE ORAL 3 TIMES DAILY PRN
Qty: 30 CAPSULE | Refills: 5 | Status: SHIPPED | OUTPATIENT
Start: 2022-08-09

## 2022-08-09 RX ORDER — MIRTAZAPINE 45 MG/1
45 TABLET, FILM COATED ORAL
Qty: 30 TABLET | Refills: 2 | Status: SHIPPED | OUTPATIENT
Start: 2022-08-09

## 2022-08-09 RX ORDER — METHYLPREDNISOLONE 4 MG/1
TABLET ORAL
Qty: 21 EACH | Refills: 0 | Status: SHIPPED | OUTPATIENT
Start: 2022-08-09

## 2022-08-09 NOTE — PROGRESS NOTES
Assessment/Plan:  Acute recurrent maxillary sinusitis will treat with a Medrol Dosepak and Cleocin 300 t i d  Chronic neck and low back pain bilaterally knee pain bilaterally foot pain treated with opioid therapy  Opioid therapy is used in conjunction with Voltaren  The PDMP has been reviewed no issues found no evidence of divergence or abuse    Severe COPD trilogy is a maintenance inhaler albuterol rescue inhaler    Tobacco use disorder the patient has tried nicotine replacement therapy in the past is not a candidate for Wellbutrin or Chantix    Anxiety with depression treated with Remeron and Seroquel    Hyperlipidemia on Lipitor 10 mg laboratories pending    Chronic steroid use an inhaled steroid use DEXA scan is pending    Problem List Items Addressed This Visit    None     Visit Diagnoses     Acute recurrent maxillary sinusitis    -  Primary    Relevant Medications    methylPREDNISolone 4 MG tablet therapy pack    clindamycin (CLEOCIN) 300 MG capsule    Recurrent major depressive disorder, in partial remission (HCC)        Relevant Medications    mirtazapine (REMERON) 45 MG tablet    hydrOXYzine pamoate (VISTARIL) 50 mg capsule    Anxiety        Relevant Medications    hydrOXYzine pamoate (VISTARIL) 50 mg capsule    Chronic neck and back pain        Relevant Medications    HYDROcodone-acetaminophen (NORCO) 7 5-325 mg per tablet           Diagnoses and all orders for this visit:    Acute recurrent maxillary sinusitis  -     methylPREDNISolone 4 MG tablet therapy pack; Use as directed on package  -     clindamycin (CLEOCIN) 300 MG capsule; Take 1 capsule (300 mg total) by mouth 4 (four) times a day for 10 days    Recurrent major depressive disorder, in partial remission (HCC)  -     mirtazapine (REMERON) 45 MG tablet; Take 1 tablet (45 mg total) by mouth daily at bedtime    Anxiety  -     hydrOXYzine pamoate (VISTARIL) 50 mg capsule;  Take 1 capsule (50 mg total) by mouth 3 (three) times a day as needed for itching    Chronic neck and back pain  -     HYDROcodone-acetaminophen (NORCO) 7 5-325 mg per tablet; Take 1 tablet by mouth every 6 (six) hours as needed for pain Max Daily Amount: 3 tablets      No problem-specific Assessment & Plan notes found for this encounter  PHQ-2/9 Depression Screening            Body mass index is 21 77 kg/m²  BMI Counseling: Body mass index is 21 77 kg/m²  The BMI   Subjective:      Patient ID: Madie Page is a 47 y o  female  Patient presents with a chief complaint of having a head congestion sinus pain and pressure since sleeping with your conditioner on      The following portions of the patient's history were reviewed and updated as appropriate:   She has a past medical history of Allergies, Anxiety, Asthma, Bereavement, Bipolar disorder ([address] ), Carpal tunnel syndrome, bilateral, Chronic bronchitis ([address] ), Chronic constipation, Chronic low back pain, Chronic neck pain, COPD (chronic obstructive pulmonary disease) ([address] ), Costochondritis, DDD (degenerative disc disease), cervical, Degenerative joint disease of left hip, Depression, Diverticulitis, DJD (degenerative joint disease) of knee, DJD (degenerative joint disease), ankle and foot, Edema, Fatigue, GABE (generalized anxiety disorder), GERD (gastroesophageal reflux disease), Hernia, inguinal, Hernia, umbilical, Hyperlipidemia, Hypertension, IBS (irritable bowel syndrome), Insomnia, Lower GI bleed, Migraine, Neuropathy, PTSD (post-traumatic stress disorder), Raynaud disease, Scalp psoriasis, Situational depression, and Tobacco dependence  ,  does not have any pertinent problems on file  ,   has a past surgical history that includes Chest tube insertion; DXA procedure(historical) (09/04/2009 & 8/24/2005); Mammo (historical) (02/14/2014); Hernia repair; and Cryotherapy  ,  family history includes Anxiety disorder in her other; Arthritis in her other; Cancer in her other; Depression in her other; Diabetes in her other; Heart disease in her other  ,   reports that she has been smoking  She has never used smokeless tobacco  She reports current alcohol use  She reports previous drug use ,  is allergic to erythromycin     Current Outpatient Medications   Medication Sig Dispense Refill    clindamycin (CLEOCIN) 300 MG capsule Take 1 capsule (300 mg total) by mouth 4 (four) times a day for 10 days 40 capsule 0    HYDROcodone-acetaminophen (NORCO) 7 5-325 mg per tablet Take 1 tablet by mouth every 6 (six) hours as needed for pain Max Daily Amount: 3 tablets 90 tablet 0    hydrOXYzine pamoate (VISTARIL) 50 mg capsule Take 1 capsule (50 mg total) by mouth 3 (three) times a day as needed for itching 30 capsule 5    methylPREDNISolone 4 MG tablet therapy pack Use as directed on package 21 each 0    mirtazapine (REMERON) 45 MG tablet Take 1 tablet (45 mg total) by mouth daily at bedtime 30 tablet 2    QUEtiapine (SEROquel) 100 mg tablet take 1 tablet by mouth daily at bedtime 90 tablet 1    atorvastatin (LIPITOR) 10 mg tablet Take 1 tablet (10 mg total) by mouth daily 90 tablet 1    benzonatate (TESSALON) 200 MG capsule Take 1 capsule (200 mg total) by mouth 3 (three) times a day as needed for cough 30 capsule 2    calcium carbonate (OS-ONELIA) 600 MG tablet Take 1 tablet (600 mg total) by mouth 2 (two) times a day with meals 180 tablet 1    diclofenac (VOLTAREN) 75 mg EC tablet Take 1 tablet (75 mg total) by mouth in the morning and 1 tablet (75 mg total) in the evening  60 tablet 5    fluticasone-umeclidinium-vilanterol (Trelegy Ellipta) 200-62 5-25 MCG/INH AEPB inhaler Inhale 1 puff daily Rinse mouth after use   1 each 5    loratadine (CLARITIN) 10 mg tablet take 1 tablet by mouth daily 90 tablet 1    methocarbamol (ROBAXIN) 750 mg tablet Take 1 tablet (750 mg total) by mouth 4 (four) times a day 120 tablet 3    methylPREDNISolone 4 MG tablet therapy pack Use as directed on package (Patient not taking: Reported on 8/9/2022) 21 each 0    Multiple Vitamins-Minerals (MULTIVITAMIN ADULT PO) Take by mouth      nicotine (NICODERM CQ) 21 mg/24 hr TD 24 hr patch Place 1 patch on the skin every 24 hours (Patient taking differently: Place 1 patch on the skin every 24 hours Uses off and on ) 28 patch 11    Promethazine-DM (PHENERGAN-DM) 6 25-15 mg/5 mL oral syrup Take 5 mL by mouth as needed in the morning and 5 mL as needed at noon and 5 mL as needed in the evening and 5 mL as needed before bedtime for cough  (Patient not taking: Reported on 6/14/2022) 180 mL 1    Promethazine-DM (PHENERGAN-DM) 6 25-15 mg/5 mL oral syrup Take 5 mL by mouth 4 (four) times a day as needed for cough (Patient not taking: Reported on 8/9/2022) 180 mL 1    pyridoxine (VITAMIN B6) 50 mg tablet Take 1 tablet (50 mg total) by mouth 3 (three) times a day 90 tablet 5    topiramate (TOPAMAX) 50 MG tablet Take 50 mg by mouth 2 (two) times a day (Patient not taking: Reported on 3/11/2022 )      TURMERIC PO Take 1 tablet by mouth       No current facility-administered medications for this visit  Review of Systems   Constitutional: Negative for chills and fever  HENT: Positive for postnasal drip, sinus pressure and sinus pain  Negative for ear pain and sore throat  Eyes: Negative for pain and visual disturbance  Respiratory: Negative for cough and shortness of breath  Cardiovascular: Negative for chest pain and palpitations  Gastrointestinal: Negative for abdominal pain and vomiting  Genitourinary: Negative for dysuria and hematuria  Musculoskeletal: Positive for back pain and neck pain  Negative for arthralgias  Skin: Negative for color change and rash  Neurological: Negative for seizures and syncope  All other systems reviewed and are negative  Objective:    /84   Pulse 88   Temp 97 8 °F (36 6 °C)   Resp 20   Ht 5' 2" (1 575 m)   Wt 54 kg (119 lb)   BMI 21 77 kg/m²   Body mass index is 21 77 kg/m²       Physical Exam  Constitutional: Appearance: She is well-developed  HENT:      Head: Normocephalic  Eyes:      Pupils: Pupils are equal, round, and reactive to light  Cardiovascular:      Rate and Rhythm: Normal rate and regular rhythm  Heart sounds: Normal heart sounds  Pulmonary:      Effort: Pulmonary effort is normal       Breath sounds: Rhonchi present  Abdominal:      General: Bowel sounds are normal       Palpations: Abdomen is soft  Tenderness: There is no abdominal tenderness  Musculoskeletal:      Cervical back: Normal range of motion  Comments: Decreased range of motion of the cervical spine lumbar paraspinal spasm with bilateral SI tenderness   Skin:     General: Skin is warm  Neurological:      Mental Status: She is alert and oriented to person, place, and time

## 2022-08-18 ENCOUNTER — HOSPITAL ENCOUNTER (OUTPATIENT)
Dept: CT IMAGING | Facility: HOSPITAL | Age: 52
Discharge: HOME | End: 2022-08-18
Attending: FAMILY MEDICINE
Payer: COMMERCIAL

## 2022-08-18 DIAGNOSIS — Z00.00 ANNUAL PHYSICAL EXAM: ICD-10-CM

## 2022-08-18 DIAGNOSIS — F17.200 TOBACCO USE DISORDER: ICD-10-CM

## 2022-08-18 PROCEDURE — 71271 CT THORAX LUNG CANCER SCR C-: CPT

## 2022-09-12 DIAGNOSIS — G89.29 CHRONIC BILATERAL LOW BACK PAIN WITHOUT SCIATICA: ICD-10-CM

## 2022-09-12 DIAGNOSIS — M54.50 CHRONIC BILATERAL LOW BACK PAIN WITHOUT SCIATICA: ICD-10-CM

## 2022-09-14 ENCOUNTER — OFFICE VISIT (OUTPATIENT)
Dept: FAMILY MEDICINE CLINIC | Facility: CLINIC | Age: 52
End: 2022-09-14
Payer: COMMERCIAL

## 2022-09-14 VITALS
RESPIRATION RATE: 20 BRPM | DIASTOLIC BLOOD PRESSURE: 80 MMHG | HEIGHT: 62 IN | SYSTOLIC BLOOD PRESSURE: 134 MMHG | TEMPERATURE: 97.7 F | BODY MASS INDEX: 22.45 KG/M2 | HEART RATE: 84 BPM | WEIGHT: 122 LBS

## 2022-09-14 DIAGNOSIS — G89.29 CHRONIC BILATERAL LOW BACK PAIN WITHOUT SCIATICA: ICD-10-CM

## 2022-09-14 DIAGNOSIS — F17.200 TOBACCO USE DISORDER: ICD-10-CM

## 2022-09-14 DIAGNOSIS — G89.29 CHRONIC NECK AND BACK PAIN: Primary | ICD-10-CM

## 2022-09-14 DIAGNOSIS — F33.41 RECURRENT MAJOR DEPRESSIVE DISORDER, IN PARTIAL REMISSION (HCC): ICD-10-CM

## 2022-09-14 DIAGNOSIS — Z88.9 HISTORY OF SEASONAL ALLERGIES: ICD-10-CM

## 2022-09-14 DIAGNOSIS — J44.9 CHRONIC OBSTRUCTIVE PULMONARY DISEASE, UNSPECIFIED COPD TYPE (HCC): ICD-10-CM

## 2022-09-14 DIAGNOSIS — M54.2 CHRONIC NECK AND BACK PAIN: Primary | ICD-10-CM

## 2022-09-14 DIAGNOSIS — M54.9 CHRONIC NECK AND BACK PAIN: Primary | ICD-10-CM

## 2022-09-14 DIAGNOSIS — M54.50 CHRONIC BILATERAL LOW BACK PAIN WITHOUT SCIATICA: ICD-10-CM

## 2022-09-14 DIAGNOSIS — E78.2 MIXED HYPERLIPIDEMIA: ICD-10-CM

## 2022-09-14 PROCEDURE — 99213 OFFICE O/P EST LOW 20 MIN: CPT | Performed by: FAMILY MEDICINE

## 2022-09-14 RX ORDER — HYDROCODONE BITARTRATE AND ACETAMINOPHEN 7.5; 325 MG/1; MG/1
1 TABLET ORAL EVERY 6 HOURS PRN
Qty: 90 TABLET | Refills: 0 | Status: SHIPPED | OUTPATIENT
Start: 2022-09-14 | End: 2022-10-14 | Stop reason: SDUPTHER

## 2022-09-14 RX ORDER — BENZONATATE 200 MG/1
200 CAPSULE ORAL 3 TIMES DAILY PRN
Qty: 30 CAPSULE | Refills: 2 | Status: SHIPPED | OUTPATIENT
Start: 2022-09-14

## 2022-09-14 RX ORDER — LORATADINE 10 MG/1
10 TABLET ORAL DAILY
Qty: 90 TABLET | Refills: 1 | Status: SHIPPED | OUTPATIENT
Start: 2022-09-14

## 2022-09-14 RX ORDER — DICLOFENAC SODIUM 75 MG/1
75 TABLET, DELAYED RELEASE ORAL 2 TIMES DAILY
Qty: 60 TABLET | Refills: 5 | Status: SHIPPED | OUTPATIENT
Start: 2022-09-14

## 2022-09-14 NOTE — PROGRESS NOTES
Assessment/Plan:  Chronic neck and low back pain with bilateral knee pain bilateral foot pain treated with opioid therapy  Opioid therapy is used in conjunction with Voltaren  The PDMP has been reviewed no issues found no evidence of divergence or abuse  Severe COPD the patient has completed lung cancer screening no evidence of nodules or cancer found    Tobacco use disorder the patient has been a nicotine replacement failure Wellbutrin and Chantix cannot be used due to depression and bipolar disorder in remission    Long history of steroid use a DEXA scan has been ordered will not be performed until October    Anxiety and depression are treated with Remeron and Seroquel    Hyperlipidemia the patient is Lipitor 10 mg laboratories are pending    Problem List Items Addressed This Visit        Other    Chronic bilateral low back pain without sciatica    Relevant Medications    diclofenac (VOLTAREN) 75 mg EC tablet    Tobacco use disorder    Mixed hyperlipidemia      Other Visit Diagnoses     Chronic neck and back pain    -  Primary    Relevant Medications    HYDROcodone-acetaminophen (NORCO) 7 5-325 mg per tablet    History of seasonal allergies        Relevant Medications    loratadine (CLARITIN) 10 mg tablet    Chronic obstructive pulmonary disease, unspecified COPD type (HCC)        Relevant Medications    loratadine (CLARITIN) 10 mg tablet    benzonatate (TESSALON) 200 MG capsule    fluticasone-umeclidinium-vilanterol (Trelegy Ellipta) 200-62 5-25 MCG/INH AEPB inhaler    fluticasone-umeclidinium-vilanterol (Trelegy Ellipta) 200-62 5-25 MCG/INH AEPB inhaler    Recurrent major depressive disorder, in partial remission ([facility] [address] )               Diagnoses and all orders for this visit:    Chronic neck and back pain  -     HYDROcodone-acetaminophen (NORCO) 7 5-325 mg per tablet;  Take 1 tablet by mouth every 6 (six) hours as needed for pain Max Daily Amount: 3 tablets    Chronic bilateral low back pain without sciatica  - diclofenac (VOLTAREN) 75 mg EC tablet; Take 1 tablet (75 mg total) by mouth 2 (two) times a day    History of seasonal allergies  -     loratadine (CLARITIN) 10 mg tablet; Take 1 tablet (10 mg total) by mouth daily    Chronic obstructive pulmonary disease, unspecified COPD type ([facility] )  -     benzonatate (TESSALON) 200 MG capsule; Take 1 capsule (200 mg total) by mouth 3 (three) times a day as needed for cough  -     fluticasone-umeclidinium-vilanterol (Trelegy Ellipta) 200-62 5-25 MCG/INH AEPB inhaler; Inhale 1 puff daily Rinse mouth after use  -     fluticasone-umeclidinium-vilanterol (Trelegy Ellipta) 200-62 5-25 MCG/INH AEPB inhaler; Inhale 1 puff daily Rinse mouth after use  Recurrent major depressive disorder, in partial remission ([facility] )    Mixed hyperlipidemia    Tobacco use disorder      No problem-specific Assessment & Plan notes found for this encounter  PHQ-2/9 Depression Screening            Body mass index is 22 31 kg/m²  BMI Counseling: Body mass index is 22 31 kg/m²  The BMI     Subjective:      Patient ID: Lucina Thakur is a 47 y o  female      Patient presents for 1 month follow-up on chronic neck and low back pain severe COPD anxiety and depression      The following portions of the patient's history were reviewed and updated as appropriate:   She has a past medical history of Allergies, Anxiety, Asthma, Bereavement, Bipolar disorder ([facility] ), Carpal tunnel syndrome, bilateral, Chronic bronchitis ([facility] ), Chronic constipation, Chronic low back pain, Chronic neck pain, COPD (chronic obstructive pulmonary disease) ([facility] ), Costochondritis, DDD (degenerative disc disease), cervical, Degenerative joint disease of left hip, Depression, Diverticulitis, DJD (degenerative joint disease) of knee, DJD (degenerative joint disease), ankle and foot, Edema, Fatigue, GABE (generalized anxiety disorder), GERD (gastroesophageal reflux disease), Hernia, inguinal, Hernia, umbilical, Hyperlipidemia, Hypertension, IBS (irritable bowel syndrome), Insomnia, Lower GI bleed, Migraine, Neuropathy, PTSD (post-traumatic stress disorder), Raynaud disease, Scalp psoriasis, Situational depression, and Tobacco dependence  ,  does not have any pertinent problems on file  ,   has a past surgical history that includes Chest tube insertion; DXA procedure(historical) (09/04/2009 & 8/24/2005); Mammo (historical) (02/14/2014); Hernia repair; and Cryotherapy  ,  family history includes Anxiety disorder in her other; Arthritis in her other; Cancer in her other; Depression in her other; Diabetes in her other; Heart disease in her other  ,   reports that she has been smoking  She started smoking about 40 years ago  She has been smoking about 2 00 packs per day  She has never used smokeless tobacco  She reports current alcohol use  She reports previous drug use ,  is allergic to erythromycin     Current Outpatient Medications   Medication Sig Dispense Refill    benzonatate (TESSALON) 200 MG capsule Take 1 capsule (200 mg total) by mouth 3 (three) times a day as needed for cough 30 capsule 2    diclofenac (VOLTAREN) 75 mg EC tablet Take 1 tablet (75 mg total) by mouth 2 (two) times a day 60 tablet 5    fluticasone-umeclidinium-vilanterol (Trelegy Ellipta) 200-62 5-25 MCG/INH AEPB inhaler Inhale 1 puff daily Rinse mouth after use  1 each 5    fluticasone-umeclidinium-vilanterol (Trelegy Ellipta) 200-62 5-25 MCG/INH AEPB inhaler Inhale 1 puff daily Rinse mouth after use   60 blister 0    HYDROcodone-acetaminophen (NORCO) 7 5-325 mg per tablet Take 1 tablet by mouth every 6 (six) hours as needed for pain Max Daily Amount: 3 tablets 90 tablet 0    loratadine (CLARITIN) 10 mg tablet Take 1 tablet (10 mg total) by mouth daily 90 tablet 1    QUEtiapine (SEROquel) 100 mg tablet take 1 tablet by mouth daily at bedtime 90 tablet 1    atorvastatin (LIPITOR) 10 mg tablet Take 1 tablet (10 mg total) by mouth daily 90 tablet 1    Calcium Carbonate 1500 (600 Ca) MG TABS take 1 tablet by mouth twice a day with meals 180 tablet 1    hydrOXYzine pamoate (VISTARIL) 50 mg capsule Take 1 capsule (50 mg total) by mouth 3 (three) times a day as needed for itching 30 capsule 5    methocarbamol (ROBAXIN) 750 mg tablet Take 1 tablet (750 mg total) by mouth 4 (four) times a day 120 tablet 3    methylPREDNISolone 4 MG tablet therapy pack Use as directed on package (Patient not taking: Reported on 8/9/2022) 21 each 0    methylPREDNISolone 4 MG tablet therapy pack Use as directed on package 21 each 0    mirtazapine (REMERON) 45 MG tablet Take 1 tablet (45 mg total) by mouth daily at bedtime 30 tablet 2    Multiple Vitamins-Minerals (MULTIVITAMIN ADULT PO) Take by mouth      nicotine (NICODERM CQ) 21 mg/24 hr TD 24 hr patch Place 1 patch on the skin every 24 hours (Patient taking differently: Place 1 patch on the skin every 24 hours Uses off and on ) 28 patch 11    Promethazine-DM (PHENERGAN-DM) 6 25-15 mg/5 mL oral syrup Take 5 mL by mouth as needed in the morning and 5 mL as needed at noon and 5 mL as needed in the evening and 5 mL as needed before bedtime for cough  (Patient not taking: Reported on 6/14/2022) 180 mL 1    Promethazine-DM (PHENERGAN-DM) 6 25-15 mg/5 mL oral syrup Take 5 mL by mouth 4 (four) times a day as needed for cough (Patient not taking: Reported on 8/9/2022) 180 mL 1    pyridoxine (VITAMIN B6) 50 mg tablet Take 1 tablet (50 mg total) by mouth 3 (three) times a day 90 tablet 5    topiramate (TOPAMAX) 50 MG tablet Take 50 mg by mouth 2 (two) times a day (Patient not taking: Reported on 3/11/2022 )      TURMERIC PO Take 1 tablet by mouth       No current facility-administered medications for this visit  Review of Systems   Constitutional: Negative for chills and fever  HENT: Negative for ear pain and sore throat  Eyes: Negative for pain and visual disturbance  Respiratory: Positive for shortness of breath and wheezing  Negative for cough  Cardiovascular: Negative for chest pain and palpitations  Gastrointestinal: Negative for abdominal pain and vomiting  Genitourinary: Negative for dysuria and hematuria  Musculoskeletal: Positive for arthralgias, back pain and neck pain  Bilateral knee and ankle pain   Skin: Negative for color change and rash  Neurological: Negative for seizures and syncope  Psychiatric/Behavioral: Positive for dysphoric mood  All other systems reviewed and are negative  Objective:    /80   Pulse 84   Temp 97 7 °F (36 5 °C)   Resp 20   Ht 5' 2" (1 575 m)   Wt 55 3 kg (122 lb)   BMI 22 31 kg/m²   Body mass index is 22 31 kg/m²  Physical Exam  Constitutional:       Appearance: She is well-developed  HENT:      Head: Normocephalic  Eyes:      Pupils: Pupils are equal, round, and reactive to light  Cardiovascular:      Rate and Rhythm: Normal rate and regular rhythm  Heart sounds: Normal heart sounds  Pulmonary:      Effort: Pulmonary effort is normal       Breath sounds: Normal breath sounds  Abdominal:      General: Bowel sounds are normal       Palpations: Abdomen is soft  Tenderness: There is no abdominal tenderness  Musculoskeletal:      Cervical back: Normal range of motion  Comments: Decreased range of motion of the cervical spine with left paraspinal tenderness and stiffness  Bilateral paraspinal spasm of the lumbar spine with SI tenderness   Skin:     General: Skin is warm  Neurological:      Mental Status: She is alert and oriented to person, place, and time

## 2022-10-11 ENCOUNTER — HOSPITAL ENCOUNTER (OUTPATIENT)
Dept: MAMMOGRAPHY | Facility: HOSPITAL | Age: 52
Discharge: HOME | End: 2022-10-11
Attending: FAMILY MEDICINE
Payer: COMMERCIAL

## 2022-10-11 ENCOUNTER — HOSPITAL ENCOUNTER (OUTPATIENT)
Dept: BONE DENSITY | Facility: HOSPITAL | Age: 52
Discharge: HOME | End: 2022-10-11
Attending: FAMILY MEDICINE
Payer: COMMERCIAL

## 2022-10-11 VITALS — BODY MASS INDEX: 22.43 KG/M2 | WEIGHT: 121.91 LBS | HEIGHT: 62 IN

## 2022-10-11 DIAGNOSIS — Z12.31 ENCOUNTER FOR SCREENING MAMMOGRAM FOR BREAST CANCER: ICD-10-CM

## 2022-10-11 DIAGNOSIS — Z79.51 CURRENT CHRONIC USE OF INHALED STEROID: ICD-10-CM

## 2022-10-11 PROCEDURE — 77080 DXA BONE DENSITY AXIAL: CPT

## 2022-10-11 PROCEDURE — 77067 SCR MAMMO BI INCL CAD: CPT

## 2022-10-11 PROCEDURE — 77063 BREAST TOMOSYNTHESIS BI: CPT

## 2022-10-12 DIAGNOSIS — M54.50 CHRONIC BILATERAL LOW BACK PAIN WITHOUT SCIATICA: ICD-10-CM

## 2022-10-12 DIAGNOSIS — G89.29 CHRONIC BILATERAL LOW BACK PAIN WITHOUT SCIATICA: ICD-10-CM

## 2022-10-13 RX ORDER — METHOCARBAMOL 750 MG/1
TABLET, FILM COATED ORAL
Qty: 120 TABLET | Refills: 3 | Status: SHIPPED | OUTPATIENT
Start: 2022-10-13

## 2022-10-14 ENCOUNTER — OFFICE VISIT (OUTPATIENT)
Dept: FAMILY MEDICINE CLINIC | Facility: CLINIC | Age: 52
End: 2022-10-14
Payer: COMMERCIAL

## 2022-10-14 VITALS
BODY MASS INDEX: 21.16 KG/M2 | WEIGHT: 115 LBS | DIASTOLIC BLOOD PRESSURE: 74 MMHG | TEMPERATURE: 97.9 F | RESPIRATION RATE: 20 BRPM | HEIGHT: 62 IN | SYSTOLIC BLOOD PRESSURE: 126 MMHG | HEART RATE: 84 BPM

## 2022-10-14 DIAGNOSIS — G89.4 CHRONIC PAIN SYNDROME: ICD-10-CM

## 2022-10-14 DIAGNOSIS — F32.A ANXIETY AND DEPRESSION: ICD-10-CM

## 2022-10-14 DIAGNOSIS — M54.2 CHRONIC NECK AND BACK PAIN: Primary | ICD-10-CM

## 2022-10-14 DIAGNOSIS — F17.200 TOBACCO USE DISORDER: ICD-10-CM

## 2022-10-14 DIAGNOSIS — M54.9 CHRONIC NECK AND BACK PAIN: Primary | ICD-10-CM

## 2022-10-14 DIAGNOSIS — J44.9 CHRONIC OBSTRUCTIVE PULMONARY DISEASE, UNSPECIFIED COPD TYPE (HCC): ICD-10-CM

## 2022-10-14 DIAGNOSIS — G89.29 CHRONIC NECK AND BACK PAIN: Primary | ICD-10-CM

## 2022-10-14 DIAGNOSIS — F41.9 ANXIETY AND DEPRESSION: ICD-10-CM

## 2022-10-14 PROCEDURE — 99213 OFFICE O/P EST LOW 20 MIN: CPT | Performed by: FAMILY MEDICINE

## 2022-10-14 RX ORDER — HYDROCODONE BITARTRATE AND ACETAMINOPHEN 7.5; 325 MG/1; MG/1
1 TABLET ORAL EVERY 6 HOURS PRN
Qty: 90 TABLET | Refills: 0 | Status: SHIPPED | OUTPATIENT
Start: 2022-10-14

## 2022-10-14 NOTE — PROGRESS NOTES
Assessment/Plan:  Chronic neck and low back pain with bilateral knee pain bilateral foot pain treated with opioid therapy  Opioid therapy is used in conjunction with Voltaren 75 mg b i d  The PDMP has been reviewed no issues found no evidence of divergence or abuse  Severe COPD the patient has completed lung cancer screening with no evidence of nodules patient currently is using Trelegy as a maintenance inhaler and is using albuterol  The patient has been a nicotine failure and has stop using the nicotine patch the patient does continue to smoke    Patient has normal bone density according to DEXA scan    Anxiety and depression treated with Remeron and Seroquel    Problem List Items Addressed This Visit        Other    Tobacco use disorder      Other Visit Diagnoses     Chronic neck and back pain    -  Primary    Relevant Medications    HYDROcodone-acetaminophen (NORCO) 7 5-325 mg per tablet    Chronic pain syndrome        Chronic obstructive pulmonary disease, unspecified COPD type ([facility] )        Relevant Medications    fluticasone-umeclidinium-vilanterol (Trelegy Ellipta) 100-62 5-25 MCG/INH inhaler    Anxiety and depression               Diagnoses and all orders for this visit:    Chronic neck and back pain  -     HYDROcodone-acetaminophen (NORCO) 7 5-325 mg per tablet; Take 1 tablet by mouth every 6 (six) hours as needed for pain Max Daily Amount: 3 tablets    Chronic pain syndrome    Chronic obstructive pulmonary disease, unspecified COPD type ([facility] )  -     fluticasone-umeclidinium-vilanterol (Trelegy Ellipta) 100-62 5-25 MCG/INH inhaler; Inhale 1 puff daily Rinse mouth after use  Anxiety and depression    Tobacco use disorder      No problem-specific Assessment & Plan notes found for this encounter        PHQ-2/9 Depression Screening    Little interest or pleasure in doing things: 0 - not at all  Feeling down, depressed, or hopeless: 1 - several days  PHQ-2 Score: 1  PHQ-2 Interpretation: Negative depression screen          Body mass index is 21 03 kg/m²  BMI Counseling: Body mass index is 21 03 kg/m²  The BMI     Subjective:      Patient ID: Madie Page is a 47 y o  female  Patient presents for 1 month checkup on chronic obstructive pulmonary disease with asthmatic component and chronic back pain      The following portions of the patient's history were reviewed and updated as appropriate:   She has a past medical history of Allergies, Anxiety, Asthma, Bereavement, Bipolar disorder ([facility] ), Carpal tunnel syndrome, bilateral, Chronic bronchitis ([facility] ), Chronic constipation, Chronic low back pain, Chronic neck pain, COPD (chronic obstructive pulmonary disease) ([facility] ), Costochondritis, DDD (degenerative disc disease), cervical, Degenerative joint disease of left hip, Depression, Diverticulitis, DJD (degenerative joint disease) of knee, DJD (degenerative joint disease), ankle and foot, Edema, Fatigue, GABE (generalized anxiety disorder), GERD (gastroesophageal reflux disease), Hernia, inguinal, Hernia, umbilical, Hyperlipidemia, Hypertension, IBS (irritable bowel syndrome), Insomnia, Lower GI bleed, Migraine, Neuropathy, PTSD (post-traumatic stress disorder), Raynaud disease, Scalp psoriasis, Situational depression, and Tobacco dependence  ,  does not have any pertinent problems on file  ,   has a past surgical history that includes Chest tube insertion; DXA procedure(historical) (09/04/2009 & 8/24/2005); Mammo (historical) (02/14/2014); Hernia repair; and Cryotherapy  ,  family history includes Anxiety disorder in her other; Arthritis in her other; Cancer in her other; Depression in her other; Diabetes in her other; Heart disease in her other; No Known Problems in her maternal uncle, paternal uncle, and paternal uncle; Pulmonary fibrosis in her maternal aunt; Rheum arthritis in her maternal aunt; Stomach cancer in her father  ,   reports that she has been smoking  She started smoking about 40 years ago   She has been smoking about 2 00 packs per day  She has never used smokeless tobacco  She reports current alcohol use  She reports previous drug use ,  is allergic to erythromycin     Current Outpatient Medications   Medication Sig Dispense Refill   • fluticasone-umeclidinium-vilanterol (Trelegy Ellipta) 100-62 5-25 MCG/INH inhaler Inhale 1 puff daily Rinse mouth after use   60 blister 0   • HYDROcodone-acetaminophen (NORCO) 7 5-325 mg per tablet Take 1 tablet by mouth every 6 (six) hours as needed for pain Max Daily Amount: 3 tablets 90 tablet 0   • QUEtiapine (SEROquel) 100 mg tablet take 1 tablet by mouth daily at bedtime 90 tablet 1   • atorvastatin (LIPITOR) 10 mg tablet Take 1 tablet (10 mg total) by mouth daily 90 tablet 1   • benzonatate (TESSALON) 200 MG capsule Take 1 capsule (200 mg total) by mouth 3 (three) times a day as needed for cough 30 capsule 2   • Calcium Carbonate 1500 (600 Ca) MG TABS take 1 tablet by mouth twice a day with meals 180 tablet 1   • diclofenac (VOLTAREN) 75 mg EC tablet Take 1 tablet (75 mg total) by mouth 2 (two) times a day 60 tablet 5   • hydrOXYzine pamoate (VISTARIL) 50 mg capsule Take 1 capsule (50 mg total) by mouth 3 (three) times a day as needed for itching 30 capsule 5   • loratadine (CLARITIN) 10 mg tablet Take 1 tablet (10 mg total) by mouth daily 90 tablet 1   • methocarbamol (ROBAXIN) 750 mg tablet take 1 tablet by mouth four times a day 120 tablet 3   • methylPREDNISolone 4 MG tablet therapy pack Use as directed on package (Patient not taking: Reported on 8/9/2022) 21 each 0   • methylPREDNISolone 4 MG tablet therapy pack Use as directed on package 21 each 0   • mirtazapine (REMERON) 45 MG tablet Take 1 tablet (45 mg total) by mouth daily at bedtime 30 tablet 2   • Multiple Vitamins-Minerals (MULTIVITAMIN ADULT PO) Take by mouth     • nicotine (NICODERM CQ) 21 mg/24 hr TD 24 hr patch Place 1 patch on the skin every 24 hours (Patient taking differently: Place 1 patch on the skin every 24 hours Uses off and on ) 28 patch 11   • Promethazine-DM (PHENERGAN-DM) 6 25-15 mg/5 mL oral syrup Take 5 mL by mouth as needed in the morning and 5 mL as needed at noon and 5 mL as needed in the evening and 5 mL as needed before bedtime for cough  (Patient not taking: Reported on 6/14/2022) 180 mL 1   • Promethazine-DM (PHENERGAN-DM) 6 25-15 mg/5 mL oral syrup Take 5 mL by mouth 4 (four) times a day as needed for cough (Patient not taking: Reported on 8/9/2022) 180 mL 1   • pyridoxine (VITAMIN B6) 50 mg tablet Take 1 tablet (50 mg total) by mouth 3 (three) times a day 90 tablet 5   • topiramate (TOPAMAX) 50 MG tablet Take 50 mg by mouth 2 (two) times a day (Patient not taking: Reported on 3/11/2022 )     • TURMERIC PO Take 1 tablet by mouth       No current facility-administered medications for this visit  Review of Systems   Constitutional: Negative for chills and fever  HENT: Negative for ear pain and sore throat  Eyes: Negative for pain and visual disturbance  Respiratory: Positive for shortness of breath and wheezing  Negative for cough  Cardiovascular: Negative for chest pain and palpitations  Gastrointestinal: Negative for abdominal pain and vomiting  Genitourinary: Negative for dysuria and hematuria  Musculoskeletal: Positive for back pain and neck pain  Negative for arthralgias  Bilateral knee pain and bilateral foot pain   Skin: Negative for color change and rash  Neurological: Negative for seizures and syncope  All other systems reviewed and are negative  Objective:    /74   Pulse 84   Temp 97 9 °F (36 6 °C)   Resp 20   Ht 5' 2" (1 575 m)   Wt 52 2 kg (115 lb)   BMI 21 03 kg/m²   Body mass index is 21 03 kg/m²  Physical Exam  Constitutional:       Appearance: She is well-developed  HENT:      Head: Normocephalic  Eyes:      Pupils: Pupils are equal, round, and reactive to light     Cardiovascular:      Rate and Rhythm: Normal rate and regular rhythm  Heart sounds: Normal heart sounds  Pulmonary:      Effort: Pulmonary effort is normal       Breath sounds: Rhonchi present  Abdominal:      General: Bowel sounds are normal       Palpations: Abdomen is soft  Tenderness: There is no abdominal tenderness  Musculoskeletal:      Cervical back: Normal range of motion  Comments: Decreased range of motion of the cervical spine with paraspinal spasm decreased range of motion of the lumbar spine with paraspinal spasm and SI tenderness bilaterally   Skin:     General: Skin is warm  Neurological:      Mental Status: She is alert and oriented to person, place, and time

## 2022-10-31 ENCOUNTER — VBI (OUTPATIENT)
Dept: ADMINISTRATIVE | Facility: OTHER | Age: 52
End: 2022-10-31

## 2022-11-10 DIAGNOSIS — F33.41 RECURRENT MAJOR DEPRESSIVE DISORDER, IN PARTIAL REMISSION (HCC): ICD-10-CM

## 2022-11-10 RX ORDER — MIRTAZAPINE 45 MG/1
TABLET, FILM COATED ORAL
Qty: 30 TABLET | Refills: 2 | Status: SHIPPED | OUTPATIENT
Start: 2022-11-10

## 2022-11-15 ENCOUNTER — OFFICE VISIT (OUTPATIENT)
Dept: FAMILY MEDICINE CLINIC | Facility: CLINIC | Age: 52
End: 2022-11-15

## 2022-11-15 VITALS
WEIGHT: 113 LBS | BODY MASS INDEX: 20.8 KG/M2 | TEMPERATURE: 97.3 F | HEIGHT: 62 IN | RESPIRATION RATE: 20 BRPM | HEART RATE: 84 BPM | SYSTOLIC BLOOD PRESSURE: 126 MMHG | DIASTOLIC BLOOD PRESSURE: 84 MMHG

## 2022-11-15 DIAGNOSIS — L30.9 DERMATITIS: ICD-10-CM

## 2022-11-15 DIAGNOSIS — J44.9 CHRONIC OBSTRUCTIVE PULMONARY DISEASE, UNSPECIFIED COPD TYPE (HCC): ICD-10-CM

## 2022-11-15 DIAGNOSIS — M54.9 CHRONIC NECK AND BACK PAIN: Primary | ICD-10-CM

## 2022-11-15 DIAGNOSIS — M54.2 CHRONIC NECK AND BACK PAIN: Primary | ICD-10-CM

## 2022-11-15 DIAGNOSIS — F17.200 TOBACCO USE DISORDER: ICD-10-CM

## 2022-11-15 DIAGNOSIS — E78.2 MIXED HYPERLIPIDEMIA: ICD-10-CM

## 2022-11-15 DIAGNOSIS — F41.9 ANXIETY: ICD-10-CM

## 2022-11-15 DIAGNOSIS — G89.29 CHRONIC NECK AND BACK PAIN: Primary | ICD-10-CM

## 2022-11-15 DIAGNOSIS — F33.41 RECURRENT MAJOR DEPRESSIVE DISORDER, IN PARTIAL REMISSION (HCC): ICD-10-CM

## 2022-11-15 RX ORDER — HYDROCODONE BITARTRATE AND ACETAMINOPHEN 7.5; 325 MG/1; MG/1
1 TABLET ORAL EVERY 6 HOURS PRN
Qty: 90 TABLET | Refills: 0 | Status: SHIPPED | OUTPATIENT
Start: 2022-11-15

## 2022-11-15 RX ORDER — ATORVASTATIN CALCIUM 10 MG/1
10 TABLET, FILM COATED ORAL DAILY
Qty: 90 TABLET | Refills: 1 | Status: SHIPPED | OUTPATIENT
Start: 2022-11-15

## 2022-11-15 RX ORDER — DEXTROMETHORPHAN HYDROBROMIDE AND PROMETHAZINE HYDROCHLORIDE 15; 6.25 MG/5ML; MG/5ML
5 SOLUTION ORAL 4 TIMES DAILY PRN
Qty: 180 ML | Refills: 1 | Status: SHIPPED | OUTPATIENT
Start: 2022-11-15

## 2022-11-15 RX ORDER — TRIAMCINOLONE ACETONIDE 1 MG/G
CREAM TOPICAL 2 TIMES DAILY
Qty: 30 G | Refills: 0 | Status: SHIPPED | OUTPATIENT
Start: 2022-11-15

## 2022-11-15 NOTE — PROGRESS NOTES
Assessment/Plan:  Nonspecific dermatitis of the posterior cervical area possibly a reaction to using Biofreeze will provide Kenalog cream b i d  Chronic cough will provide promethazine DM    Chronic neck and low back pain with bilateral knee pain and bilateral foot pain treated with opioid therapy  Opioid therapy is used in conjunction with Voltaren 75 b i d     The PDMP has been reviewed no issues found no evidence of divergence or abuse    Severe COPD trilogy is used as a maintenance inhaler albuterol is used as rescue inhaler the patient has been in nicotine replacement failure    Anxiety and depression treated with Remeron and Seroquel    Mixed hyperlipidemia laboratory is pending        Problem List Items Addressed This Visit        Other    Mixed hyperlipidemia      Other Visit Diagnoses     Chronic neck and back pain               Diagnoses and all orders for this visit:    Mixed hyperlipidemia    Chronic neck and back pain        No problem-specific Assessment & Plan notes found for this encounter  PHQ-2/9 Depression Screening            Body mass index is 20 67 kg/m²  BMI Counseling: Body mass index is 20 67 kg/m²  The BMI     Subjective:      Patient ID: Janett Blanc is a 47 y o  female      Patient presents for routine checkup      The following portions of the patient's history were reviewed and updated as appropriate:   She has a past medical history of Allergies, Anxiety, Asthma, Bereavement, Bipolar disorder ([address] ), Carpal tunnel syndrome, bilateral, Chronic bronchitis ([address] ), Chronic constipation, Chronic low back pain, Chronic neck pain, COPD (chronic obstructive pulmonary disease) ([address] ), Costochondritis, DDD (degenerative disc disease), cervical, Degenerative joint disease of left hip, Depression, Diverticulitis, DJD (degenerative joint disease) of knee, DJD (degenerative joint disease), ankle and foot, Edema, Fatigue, GABE (generalized anxiety disorder), GERD (gastroesophageal reflux disease), Hernia, inguinal, Hernia, umbilical, Hyperlipidemia, Hypertension, IBS (irritable bowel syndrome), Insomnia, Lower GI bleed, Migraine, Neuropathy, PTSD (post-traumatic stress disorder), Raynaud disease, Scalp psoriasis, Situational depression, and Tobacco dependence  ,  does not have any pertinent problems on file  ,   has a past surgical history that includes Chest tube insertion; DXA procedure(historical) (09/04/2009 & 8/24/2005); Mammo (historical) (02/14/2014); Hernia repair; and Cryotherapy  ,  family history includes Anxiety disorder in her other; Arthritis in her other; Cancer in her other; Depression in her other; Diabetes in her other; Heart disease in her other; No Known Problems in her maternal uncle, paternal uncle, and paternal uncle; Pulmonary fibrosis in her maternal aunt; Rheum arthritis in her maternal aunt; Stomach cancer in her father  ,   reports that she has been smoking  She started smoking about 40 years ago  She has been smoking about 2 00 packs per day  She has never used smokeless tobacco  She reports current alcohol use  She reports previous drug use ,  is allergic to erythromycin     Current Outpatient Medications   Medication Sig Dispense Refill   • QUEtiapine (SEROquel) 100 mg tablet take 1 tablet by mouth daily at bedtime 90 tablet 1   • atorvastatin (LIPITOR) 10 mg tablet Take 1 tablet (10 mg total) by mouth daily 90 tablet 1   • benzonatate (TESSALON) 200 MG capsule Take 1 capsule (200 mg total) by mouth 3 (three) times a day as needed for cough 30 capsule 2   • Calcium Carbonate 1500 (600 Ca) MG TABS take 1 tablet by mouth twice a day with meals 180 tablet 1   • diclofenac (VOLTAREN) 75 mg EC tablet Take 1 tablet (75 mg total) by mouth 2 (two) times a day 60 tablet 5   • fluticasone-umeclidinium-vilanterol (Trelegy Ellipta) 100-62 5-25 MCG/INH inhaler Inhale 1 puff daily Rinse mouth after use   60 blister 0   • fluticasone-umeclidinium-vilanterol (Trelegy Ellipta) 195-33 6-16 MCG/INH AEPB inhaler Inhale 1 puff daily Rinse mouth after use  60 blister 5   • HYDROcodone-acetaminophen (NORCO) 7 5-325 mg per tablet Take 1 tablet by mouth every 6 (six) hours as needed for pain Max Daily Amount: 3 tablets 90 tablet 0   • hydrOXYzine pamoate (VISTARIL) 50 mg capsule Take 1 capsule (50 mg total) by mouth 3 (three) times a day as needed for itching 30 capsule 5   • loratadine (CLARITIN) 10 mg tablet Take 1 tablet (10 mg total) by mouth daily 90 tablet 1   • methocarbamol (ROBAXIN) 750 mg tablet take 1 tablet by mouth four times a day 120 tablet 3   • methylPREDNISolone 4 MG tablet therapy pack Use as directed on package (Patient not taking: Reported on 8/9/2022) 21 each 0   • methylPREDNISolone 4 MG tablet therapy pack Use as directed on package 21 each 0   • mirtazapine (REMERON) 45 MG tablet take 1 tablet by mouth at bedtime daily 30 tablet 2   • Multiple Vitamins-Minerals (MULTIVITAMIN ADULT PO) Take by mouth     • nicotine (NICODERM CQ) 21 mg/24 hr TD 24 hr patch Place 1 patch on the skin every 24 hours (Patient taking differently: Place 1 patch on the skin every 24 hours Uses off and on ) 28 patch 11   • Promethazine-DM (PHENERGAN-DM) 6 25-15 mg/5 mL oral syrup Take 5 mL by mouth as needed in the morning and 5 mL as needed at noon and 5 mL as needed in the evening and 5 mL as needed before bedtime for cough  (Patient not taking: Reported on 6/14/2022) 180 mL 1   • Promethazine-DM (PHENERGAN-DM) 6 25-15 mg/5 mL oral syrup Take 5 mL by mouth 4 (four) times a day as needed for cough (Patient not taking: Reported on 8/9/2022) 180 mL 1   • pyridoxine (VITAMIN B6) 50 mg tablet Take 1 tablet (50 mg total) by mouth 3 (three) times a day 90 tablet 5   • topiramate (TOPAMAX) 50 MG tablet Take 50 mg by mouth 2 (two) times a day (Patient not taking: Reported on 3/11/2022 )     • TURMERIC PO Take 1 tablet by mouth       No current facility-administered medications for this visit         Review of Systems Constitutional: Negative for chills and fever  HENT: Negative for ear pain and sore throat  Eyes: Negative for pain and visual disturbance  Respiratory: Positive for cough  Negative for shortness of breath  Cardiovascular: Negative for chest pain and palpitations  Gastrointestinal: Negative for abdominal pain and vomiting  Genitourinary: Negative for dysuria and hematuria  Musculoskeletal: Positive for back pain and neck pain  Negative for arthralgias  Skin: Negative for color change and rash  Neurological: Negative for seizures and syncope  All other systems reviewed and are negative  Objective:    /84   Pulse 84   Temp (!) 97 3 °F (36 3 °C)   Resp 20   Ht 5' 2" (1 575 m)   Wt 51 3 kg (113 lb)   BMI 20 67 kg/m²   Body mass index is 20 67 kg/m²  Physical Exam  Constitutional:       Appearance: She is well-developed  HENT:      Head: Normocephalic  Eyes:      Pupils: Pupils are equal, round, and reactive to light  Cardiovascular:      Rate and Rhythm: Normal rate and regular rhythm  Heart sounds: Normal heart sounds  Pulmonary:      Effort: Pulmonary effort is normal       Breath sounds: Normal breath sounds  Abdominal:      General: Bowel sounds are normal       Palpations: Abdomen is soft  Tenderness: There is no abdominal tenderness  Musculoskeletal:      Cervical back: Normal range of motion  Comments: Decreased reasonable motion of the cervical spine with multiple trigger points lumbar paraspinal spasm with bilateral SI tenderness   Skin:     General: Skin is warm  Comments: Nonspecific dermatitis on the posterior cervical area   Neurological:      Mental Status: She is alert and oriented to person, place, and time  Psychiatric:         Mood and Affect: Mood normal          Behavior: Behavior normal          Thought Content:  Thought content normal          Judgment: Judgment normal

## 2022-12-20 ENCOUNTER — OFFICE VISIT (OUTPATIENT)
Dept: FAMILY MEDICINE CLINIC | Facility: CLINIC | Age: 52
End: 2022-12-20

## 2022-12-20 VITALS
HEART RATE: 84 BPM | SYSTOLIC BLOOD PRESSURE: 122 MMHG | DIASTOLIC BLOOD PRESSURE: 68 MMHG | TEMPERATURE: 97.5 F | BODY MASS INDEX: 21.53 KG/M2 | HEIGHT: 62 IN | RESPIRATION RATE: 20 BRPM | WEIGHT: 117 LBS

## 2022-12-20 DIAGNOSIS — F33.41 RECURRENT MAJOR DEPRESSIVE DISORDER, IN PARTIAL REMISSION (HCC): ICD-10-CM

## 2022-12-20 DIAGNOSIS — J44.9 CHRONIC OBSTRUCTIVE PULMONARY DISEASE, UNSPECIFIED COPD TYPE (HCC): ICD-10-CM

## 2022-12-20 DIAGNOSIS — G89.29 CHRONIC BILATERAL LOW BACK PAIN WITHOUT SCIATICA: ICD-10-CM

## 2022-12-20 DIAGNOSIS — F17.200 TOBACCO USE DISORDER: ICD-10-CM

## 2022-12-20 DIAGNOSIS — G89.29 CHRONIC NECK AND BACK PAIN: Primary | ICD-10-CM

## 2022-12-20 DIAGNOSIS — M54.2 CHRONIC NECK AND BACK PAIN: Primary | ICD-10-CM

## 2022-12-20 DIAGNOSIS — J40 BRONCHITIS: ICD-10-CM

## 2022-12-20 DIAGNOSIS — M54.50 CHRONIC BILATERAL LOW BACK PAIN WITHOUT SCIATICA: ICD-10-CM

## 2022-12-20 DIAGNOSIS — M54.9 CHRONIC NECK AND BACK PAIN: Primary | ICD-10-CM

## 2022-12-20 RX ORDER — QUETIAPINE FUMARATE 100 MG/1
100 TABLET, FILM COATED ORAL
Qty: 90 TABLET | Refills: 1 | Status: SHIPPED | OUTPATIENT
Start: 2022-12-20

## 2022-12-20 RX ORDER — DOXYCYCLINE HYCLATE 100 MG/1
100 CAPSULE ORAL EVERY 12 HOURS SCHEDULED
Qty: 20 CAPSULE | Refills: 1 | Status: SHIPPED | OUTPATIENT
Start: 2022-12-20 | End: 2022-12-30

## 2022-12-20 RX ORDER — BENZONATATE 200 MG/1
CAPSULE ORAL
Qty: 30 CAPSULE | Refills: 2 | Status: SHIPPED | OUTPATIENT
Start: 2022-12-20

## 2022-12-20 RX ORDER — HYDROCODONE BITARTRATE AND ACETAMINOPHEN 7.5; 325 MG/1; MG/1
1 TABLET ORAL EVERY 6 HOURS PRN
Qty: 90 TABLET | Refills: 0 | Status: SHIPPED | OUTPATIENT
Start: 2022-12-20

## 2022-12-20 NOTE — PROGRESS NOTES
Assessment/Plan:  Severe COPD with chronic cough continued tobacco use with acute bronchitis will provide doxycycline 100 mg b i d  Chronic neck and low back pain with bilateral knee pain and bilateral foot pain treated with opioid therapy  Opioid therapy is used in conjunction with Voltaren 75 mg b i d     The PDMP has been reviewed no issues found no evidence of divergence or abuse  Severe COPD trilogy is used as a maintenance inhaler albuterol as uses rescue inhaler the patient has been in nicotine replacement failure    Anxiety and depression treated with Remeron and Seroquel    Tobacco use disorder the patient has been in nicotine replacement failure is not ready to quit at this time    Problem List Items Addressed This Visit        Other    Tobacco use disorder   Other Visit Diagnoses     Chronic neck and back pain    -  Primary    Relevant Medications    HYDROcodone-acetaminophen (NORCO) 7 5-325 mg per tablet    Recurrent major depressive disorder, in partial remission (HCC)        Relevant Medications    QUEtiapine (SEROquel) 100 mg tablet    Chronic obstructive pulmonary disease, unspecified COPD type (HCC)        Bronchitis        Relevant Medications    doxycycline hyclate (VIBRAMYCIN) 100 mg capsule           Diagnoses and all orders for this visit:    Chronic neck and back pain  -     HYDROcodone-acetaminophen (NORCO) 7 5-325 mg per tablet; Take 1 tablet by mouth every 6 (six) hours as needed for pain Max Daily Amount: 3 tablets    Recurrent major depressive disorder, in partial remission (HCC)  -     QUEtiapine (SEROquel) 100 mg tablet; Take 1 tablet (100 mg total) by mouth daily at bedtime    Chronic obstructive pulmonary disease, unspecified COPD type (HCC)    Tobacco use disorder    Bronchitis  -     doxycycline hyclate (VIBRAMYCIN) 100 mg capsule;  Take 1 capsule (100 mg total) by mouth every 12 (twelve) hours for 10 days        No problem-specific Assessment & Plan notes found for this encounter  PHQ-2/9 Depression Screening            Body mass index is 21 4 kg/m²  BMI Counseling: Body mass index is 21 4 kg/m²  The BMI   Subjective:      Patient ID: Marcos Llanes is a 47 y o  female  Patient presents 1 month checkup complains of a cough productive of thick mucus yellowish in color and chronic neck and back pain      The following portions of the patient's history were reviewed and updated as appropriate:   She has a past medical history of Allergies, Anxiety, Asthma, Bereavement, Bipolar disorder ([address] ), Carpal tunnel syndrome, bilateral, Chronic bronchitis ([address] ), Chronic constipation, Chronic low back pain, Chronic neck pain, COPD (chronic obstructive pulmonary disease) ([address] ), Costochondritis, DDD (degenerative disc disease), cervical, Degenerative joint disease of left hip, Depression, Diverticulitis, DJD (degenerative joint disease) of knee, DJD (degenerative joint disease), ankle and foot, Edema, Fatigue, GABE (generalized anxiety disorder), GERD (gastroesophageal reflux disease), Hernia, inguinal, Hernia, umbilical, Hyperlipidemia, Hypertension, IBS (irritable bowel syndrome), Insomnia, Lower GI bleed, Migraine, Neuropathy, PTSD (post-traumatic stress disorder), Raynaud disease, Scalp psoriasis, Situational depression, and Tobacco dependence  ,  does not have any pertinent problems on file  ,   has a past surgical history that includes Chest tube insertion; DXA procedure(historical) (09/04/2009 & 8/24/2005); Mammo (historical) (02/14/2014); Hernia repair; and Cryotherapy  ,  family history includes Anxiety disorder in her other; Arthritis in her other; Cancer in her other; Depression in her other; Diabetes in her other; Heart disease in her other; No Known Problems in her maternal uncle, paternal uncle, and paternal uncle; Pulmonary fibrosis in her maternal aunt; Rheum arthritis in her maternal aunt; Stomach cancer in her father  ,   reports that she has been smoking   She started smoking about 40 years ago  She has been smoking an average of 2 packs per day  She has never used smokeless tobacco  She reports current alcohol use  She reports that she does not currently use drugs  ,  is allergic to erythromycin     Current Outpatient Medications   Medication Sig Dispense Refill   • doxycycline hyclate (VIBRAMYCIN) 100 mg capsule Take 1 capsule (100 mg total) by mouth every 12 (twelve) hours for 10 days 20 capsule 1   • HYDROcodone-acetaminophen (NORCO) 7 5-325 mg per tablet Take 1 tablet by mouth every 6 (six) hours as needed for pain Max Daily Amount: 3 tablets 90 tablet 0   • QUEtiapine (SEROquel) 100 mg tablet Take 1 tablet (100 mg total) by mouth daily at bedtime 90 tablet 1   • atorvastatin (LIPITOR) 10 mg tablet Take 1 tablet (10 mg total) by mouth daily 90 tablet 1   • benzonatate (TESSALON) 200 MG capsule TAKE 1 TABLET BY MOUTH 3 TIMES A DAY AS NEEDED FOR COUGH 30 capsule 2   • Calcium Carbonate 1500 (600 Ca) MG TABS take 1 tablet by mouth twice a day with meals 180 tablet 1   • diclofenac (VOLTAREN) 75 mg EC tablet Take 1 tablet (75 mg total) by mouth 2 (two) times a day 60 tablet 5   • fluticasone-umeclidinium-vilanterol (Trelegy Ellipta) 100-62 5-25 MCG/INH inhaler Inhale 1 puff daily Rinse mouth after use  60 blister 0   • fluticasone-umeclidinium-vilanterol (Trelegy Ellipta) 200-62 5-25 MCG/INH AEPB inhaler Inhale 1 puff daily Rinse mouth after use   60 blister 5   • hydrOXYzine pamoate (VISTARIL) 50 mg capsule Take 1 capsule (50 mg total) by mouth 3 (three) times a day as needed for itching 30 capsule 5   • loratadine (CLARITIN) 10 mg tablet Take 1 tablet (10 mg total) by mouth daily 90 tablet 1   • methocarbamol (ROBAXIN) 750 mg tablet take 1 tablet by mouth four times a day 120 tablet 3   • methylPREDNISolone 4 MG tablet therapy pack Use as directed on package (Patient not taking: Reported on 8/9/2022) 21 each 0   • methylPREDNISolone 4 MG tablet therapy pack Use as directed on package 21 each 0   • mirtazapine (REMERON) 45 MG tablet take 1 tablet by mouth at bedtime daily 30 tablet 2   • Multiple Vitamins-Minerals (MULTIVITAMIN ADULT PO) Take by mouth     • nicotine (NICODERM CQ) 21 mg/24 hr TD 24 hr patch Place 1 patch on the skin every 24 hours (Patient taking differently: Place 1 patch on the skin every 24 hours Uses off and on ) 28 patch 11   • Promethazine-DM (PHENERGAN-DM) 6 25-15 mg/5 mL oral syrup Take 5 mL by mouth 4 (four) times a day as needed for cough 180 mL 1   • pyridoxine (VITAMIN B6) 50 mg tablet Take 1 tablet (50 mg total) by mouth 3 (three) times a day 90 tablet 5   • topiramate (TOPAMAX) 50 MG tablet Take 50 mg by mouth 2 (two) times a day (Patient not taking: Reported on 3/11/2022 )     • triamcinolone (KENALOG) 0 1 % cream Apply topically 2 (two) times a day 30 g 0   • TURMERIC PO Take 1 tablet by mouth       No current facility-administered medications for this visit  Review of Systems   Constitutional: Negative for chills and fever  HENT: Negative for ear pain and sore throat  Eyes: Negative for pain and visual disturbance  Respiratory: Positive for cough and wheezing  Negative for shortness of breath  Cardiovascular: Negative for chest pain and palpitations  Gastrointestinal: Negative for abdominal pain and vomiting  Genitourinary: Negative for dysuria and hematuria  Musculoskeletal: Positive for back pain and neck pain  Negative for arthralgias  Skin: Negative for color change and rash  Neurological: Negative for seizures and syncope  All other systems reviewed and are negative  Objective:    /68   Pulse 84   Temp 97 5 °F (36 4 °C)   Resp 20   Ht 5' 2" (1 575 m)   Wt 53 1 kg (117 lb)   BMI 21 40 kg/m²   Body mass index is 21 4 kg/m²  Physical Exam  Constitutional:       Appearance: She is well-developed  HENT:      Head: Normocephalic  Eyes:      Pupils: Pupils are equal, round, and reactive to light  Cardiovascular:      Rate and Rhythm: Normal rate and regular rhythm  Heart sounds: Normal heart sounds  Pulmonary:      Effort: Pulmonary effort is normal       Breath sounds: Rhonchi present  Abdominal:      General: Bowel sounds are normal       Palpations: Abdomen is soft  Tenderness: There is no abdominal tenderness  Musculoskeletal:      Cervical back: Normal range of motion  Comments: Multiple trigger points of the cervical spine and lumbar spine   Skin:     General: Skin is warm  Neurological:      Mental Status: She is alert and oriented to person, place, and time

## 2023-01-17 ENCOUNTER — VBI (OUTPATIENT)
Dept: ADMINISTRATIVE | Facility: OTHER | Age: 53
End: 2023-01-17

## 2023-01-24 ENCOUNTER — OFFICE VISIT (OUTPATIENT)
Dept: FAMILY MEDICINE CLINIC | Facility: CLINIC | Age: 53
End: 2023-01-24

## 2023-01-24 VITALS
SYSTOLIC BLOOD PRESSURE: 134 MMHG | DIASTOLIC BLOOD PRESSURE: 72 MMHG | HEART RATE: 68 BPM | HEIGHT: 62 IN | TEMPERATURE: 97.3 F | RESPIRATION RATE: 20 BRPM | WEIGHT: 122 LBS | BODY MASS INDEX: 22.45 KG/M2

## 2023-01-24 DIAGNOSIS — Z88.9 HISTORY OF SEASONAL ALLERGIES: ICD-10-CM

## 2023-01-24 DIAGNOSIS — F41.9 ANXIETY: ICD-10-CM

## 2023-01-24 DIAGNOSIS — J45.40 MODERATE PERSISTENT ASTHMA, UNSPECIFIED WHETHER COMPLICATED: ICD-10-CM

## 2023-01-24 DIAGNOSIS — M54.2 CHRONIC NECK AND BACK PAIN: Primary | ICD-10-CM

## 2023-01-24 DIAGNOSIS — J44.9 CHRONIC OBSTRUCTIVE PULMONARY DISEASE, UNSPECIFIED COPD TYPE (HCC): ICD-10-CM

## 2023-01-24 DIAGNOSIS — F33.41 RECURRENT MAJOR DEPRESSIVE DISORDER, IN PARTIAL REMISSION (HCC): ICD-10-CM

## 2023-01-24 DIAGNOSIS — G89.29 CHRONIC NECK AND BACK PAIN: Primary | ICD-10-CM

## 2023-01-24 DIAGNOSIS — M54.9 CHRONIC NECK AND BACK PAIN: Primary | ICD-10-CM

## 2023-01-24 RX ORDER — HYDROCODONE BITARTRATE AND ACETAMINOPHEN 7.5; 325 MG/1; MG/1
1 TABLET ORAL EVERY 6 HOURS PRN
Qty: 90 TABLET | Refills: 0 | Status: SHIPPED | OUTPATIENT
Start: 2023-01-24

## 2023-01-24 RX ORDER — LORATADINE 10 MG/1
10 TABLET ORAL DAILY
Qty: 90 TABLET | Refills: 1 | Status: SHIPPED | OUTPATIENT
Start: 2023-01-24

## 2023-01-24 RX ORDER — HYDROXYZINE PAMOATE 50 MG/1
50 CAPSULE ORAL 3 TIMES DAILY PRN
Qty: 30 CAPSULE | Refills: 5 | Status: SHIPPED | OUTPATIENT
Start: 2023-01-24

## 2023-01-24 RX ORDER — MIRTAZAPINE 45 MG/1
45 TABLET, FILM COATED ORAL
Qty: 30 TABLET | Refills: 2 | Status: SHIPPED | OUTPATIENT
Start: 2023-01-24

## 2023-01-24 NOTE — PROGRESS NOTES
Assessment/Plan: chronic neck and low back pain with bilateral knee pain and bilateral foot pain treated with opioid therapy  Opioid therapy is used in conjunction with Voltaren 75 mg b i d     The PDMP has been reviewed no issues found no evidence of divergence or abuse    Severe COPD trilogy is used as a maintenance inhaler albuterol as uses rescue inhaler the patient has cut back on tobacco to 1 pack per day    Anxiety and depression treated with Remeron and Seroquel    Allergies are treated with Claritin 10 mg daily      Vistaril 50 mg t i d  for pruritus    Problem List Items Addressed This Visit    None  Visit Diagnoses     History of seasonal allergies        Recurrent major depressive disorder, in partial remission (HCC)        Anxiety        Chronic neck and back pain               Diagnoses and all orders for this visit:    History of seasonal allergies    Recurrent major depressive disorder, in partial remission (HCC)    Anxiety    Chronic neck and back pain        No problem-specific Assessment & Plan notes found for this encounter  PHQ-2/9 Depression Screening            Body mass index is 22 31 kg/m²  BMI Counseling: Body mass index is 22 31 kg/m²  The BMI     Subjective:      Patient ID: Robert Stoddard is a 47 y o  female       Patient presents for 1 month checkup      The following portions of the patient's history were reviewed and updated as appropriate:   She has a past medical history of Allergies, Anxiety, Asthma, Bereavement, Bipolar disorder ([address] ), Carpal tunnel syndrome, bilateral, Chronic bronchitis ([address] ), Chronic constipation, Chronic low back pain, Chronic neck pain, COPD (chronic obstructive pulmonary disease) ([address] ), Costochondritis, DDD (degenerative disc disease), cervical, Degenerative joint disease of left hip, Depression, Diverticulitis, DJD (degenerative joint disease) of knee, DJD (degenerative joint disease), ankle and foot, Edema, Fatigue, GABE (generalized anxiety disorder), GERD (gastroesophageal reflux disease), Hernia, inguinal, Hernia, umbilical, Hyperlipidemia, Hypertension, IBS (irritable bowel syndrome), Insomnia, Lower GI bleed, Migraine, Neuropathy, PTSD (post-traumatic stress disorder), Raynaud disease, Scalp psoriasis, Situational depression, and Tobacco dependence  ,  does not have any pertinent problems on file  ,   has a past surgical history that includes Chest tube insertion; DXA procedure(historical) (09/04/2009 & 8/24/2005); Mammo (historical) (02/14/2014); Hernia repair; and Cryotherapy  ,  family history includes Anxiety disorder in her other; Arthritis in her other; Cancer in her other; Depression in her other; Diabetes in her other; Heart disease in her other; No Known Problems in her maternal uncle, paternal uncle, and paternal uncle; Pulmonary fibrosis in her maternal aunt; Rheum arthritis in her maternal aunt; Stomach cancer in her father  ,   reports that she has been smoking  She started smoking about 41 years ago  She has been smoking an average of 2 packs per day  She has never used smokeless tobacco  She reports current alcohol use  She reports that she does not currently use drugs  ,  is allergic to erythromycin     Current Outpatient Medications   Medication Sig Dispense Refill   • atorvastatin (LIPITOR) 10 mg tablet Take 1 tablet (10 mg total) by mouth daily 90 tablet 1   • benzonatate (TESSALON) 200 MG capsule TAKE 1 TABLET BY MOUTH 3 TIMES A DAY AS NEEDED FOR COUGH 30 capsule 2   • Calcium Carbonate 1500 (600 Ca) MG TABS take 1 tablet by mouth twice a day with meals 180 tablet 1   • diclofenac (VOLTAREN) 75 mg EC tablet Take 1 tablet (75 mg total) by mouth 2 (two) times a day 60 tablet 5   • fluticasone-umeclidinium-vilanterol (Trelegy Ellipta) 100-62 5-25 MCG/INH inhaler Inhale 1 puff daily Rinse mouth after use   60 blister 0   • fluticasone-umeclidinium-vilanterol (Trelegy Ellipta) 200-62 5-25 MCG/INH AEPB inhaler Inhale 1 puff daily Rinse mouth after use  60 blister 5   • HYDROcodone-acetaminophen (NORCO) 7 5-325 mg per tablet Take 1 tablet by mouth every 6 (six) hours as needed for pain Max Daily Amount: 3 tablets 90 tablet 0   • hydrOXYzine pamoate (VISTARIL) 50 mg capsule Take 1 capsule (50 mg total) by mouth 3 (three) times a day as needed for itching 30 capsule 5   • loratadine (CLARITIN) 10 mg tablet Take 1 tablet (10 mg total) by mouth daily 90 tablet 1   • methocarbamol (ROBAXIN) 750 mg tablet take 1 tablet by mouth four times a day 120 tablet 3   • methylPREDNISolone 4 MG tablet therapy pack Use as directed on package (Patient not taking: Reported on 8/9/2022) 21 each 0   • methylPREDNISolone 4 MG tablet therapy pack Use as directed on package 21 each 0   • mirtazapine (REMERON) 45 MG tablet take 1 tablet by mouth at bedtime daily 30 tablet 2   • Multiple Vitamins-Minerals (MULTIVITAMIN ADULT PO) Take by mouth     • nicotine (NICODERM CQ) 21 mg/24 hr TD 24 hr patch Place 1 patch on the skin every 24 hours (Patient taking differently: Place 1 patch on the skin every 24 hours Uses off and on ) 28 patch 11   • Promethazine-DM (PHENERGAN-DM) 6 25-15 mg/5 mL oral syrup Take 5 mL by mouth 4 (four) times a day as needed for cough 180 mL 1   • pyridoxine (VITAMIN B6) 50 mg tablet Take 1 tablet (50 mg total) by mouth 3 (three) times a day 90 tablet 5   • QUEtiapine (SEROquel) 100 mg tablet Take 1 tablet (100 mg total) by mouth daily at bedtime 90 tablet 1   • topiramate (TOPAMAX) 50 MG tablet Take 50 mg by mouth 2 (two) times a day (Patient not taking: Reported on 3/11/2022 )     • triamcinolone (KENALOG) 0 1 % cream Apply topically 2 (two) times a day 30 g 0   • TURMERIC PO Take 1 tablet by mouth       No current facility-administered medications for this visit  Review of Systems   Constitutional: Negative for chills and fever  HENT: Negative for ear pain and sore throat  Eyes: Negative for pain and visual disturbance     Respiratory: Negative for cough and shortness of breath  Cardiovascular: Negative for chest pain and palpitations  Gastrointestinal: Negative for abdominal pain and vomiting  Genitourinary: Negative for dysuria and hematuria  Musculoskeletal: Positive for arthralgias, back pain and neck pain  Skin: Negative for color change and rash  Neurological: Negative for seizures and syncope  Psychiatric/Behavioral: Positive for dysphoric mood  The patient is nervous/anxious  All other systems reviewed and are negative  Objective:    /72   Pulse 68   Temp (!) 97 3 °F (36 3 °C)   Resp 20   Ht 5' 2" (1 575 m)   Wt 55 3 kg (122 lb)   BMI 22 31 kg/m²   Body mass index is 22 31 kg/m²  Physical Exam  Constitutional:       Appearance: She is well-developed  HENT:      Head: Normocephalic  Right Ear: Tympanic membrane, ear canal and external ear normal       Left Ear: Tympanic membrane, ear canal and external ear normal       Nose: Nose normal       Mouth/Throat:      Mouth: Mucous membranes are moist       Pharynx: Oropharynx is clear  Eyes:      Extraocular Movements: Extraocular movements intact  Conjunctiva/sclera: Conjunctivae normal       Pupils: Pupils are equal, round, and reactive to light  Cardiovascular:      Rate and Rhythm: Normal rate and regular rhythm  Heart sounds: Normal heart sounds  Pulmonary:      Effort: Pulmonary effort is normal       Breath sounds: Normal breath sounds  Abdominal:      General: Bowel sounds are normal       Palpations: Abdomen is soft  Tenderness: There is no abdominal tenderness  Musculoskeletal:      Cervical back: Normal range of motion  Comments:  Decreased range of motion of the cervical spine with multiple trigger points lumbar paraspinal spasm with bilateral SI tenderness and mild Baker cyst bilaterally of the knees   Skin:     General: Skin is warm     Neurological:      Mental Status: She is alert and oriented to person, place, and time  Psychiatric:         Mood and Affect: Mood normal          Behavior: Behavior normal          Thought Content:  Thought content normal          Judgment: Judgment normal

## 2023-02-24 ENCOUNTER — OFFICE VISIT (OUTPATIENT)
Dept: FAMILY MEDICINE CLINIC | Facility: CLINIC | Age: 53
End: 2023-02-24

## 2023-02-24 VITALS
WEIGHT: 128 LBS | DIASTOLIC BLOOD PRESSURE: 64 MMHG | SYSTOLIC BLOOD PRESSURE: 126 MMHG | BODY MASS INDEX: 23.55 KG/M2 | HEIGHT: 62 IN | TEMPERATURE: 96.6 F | HEART RATE: 84 BPM | RESPIRATION RATE: 20 BRPM

## 2023-02-24 DIAGNOSIS — G89.29 CHRONIC NECK AND BACK PAIN: ICD-10-CM

## 2023-02-24 DIAGNOSIS — M54.9 CHRONIC NECK AND BACK PAIN: ICD-10-CM

## 2023-02-24 DIAGNOSIS — G89.29 CHRONIC BILATERAL LOW BACK PAIN WITHOUT SCIATICA: Primary | ICD-10-CM

## 2023-02-24 DIAGNOSIS — M54.2 CHRONIC NECK AND BACK PAIN: ICD-10-CM

## 2023-02-24 DIAGNOSIS — G62.9 NEUROPATHY: ICD-10-CM

## 2023-02-24 DIAGNOSIS — F17.200 TOBACCO USE DISORDER: ICD-10-CM

## 2023-02-24 DIAGNOSIS — J44.9 CHRONIC OBSTRUCTIVE PULMONARY DISEASE, UNSPECIFIED COPD TYPE (HCC): ICD-10-CM

## 2023-02-24 DIAGNOSIS — M54.50 CHRONIC BILATERAL LOW BACK PAIN WITHOUT SCIATICA: Primary | ICD-10-CM

## 2023-02-24 RX ORDER — LANOLIN ALCOHOL/MO/W.PET/CERES
50 CREAM (GRAM) TOPICAL DAILY
Qty: 90 TABLET | Refills: 1 | Status: SHIPPED | OUTPATIENT
Start: 2023-02-24 | End: 2023-05-25

## 2023-02-24 RX ORDER — HYDROCODONE BITARTRATE AND ACETAMINOPHEN 7.5; 325 MG/1; MG/1
1 TABLET ORAL EVERY 6 HOURS PRN
Qty: 90 TABLET | Refills: 0 | Status: SHIPPED | OUTPATIENT
Start: 2023-02-24

## 2023-02-24 RX ORDER — BENZONATATE 200 MG/1
200 CAPSULE ORAL 3 TIMES DAILY PRN
Qty: 30 CAPSULE | Refills: 2 | Status: SHIPPED | OUTPATIENT
Start: 2023-02-24

## 2023-02-24 NOTE — PROGRESS NOTES
Assessment/Plan: Chronic neck and low back pain with bilateral knee pain and bilateral foot pain treated with opioid therapy  Opioid therapy is used in conjunction with Voltaren 75 mg twice daily  The PDMP has been reviewed no issues found no evidence of divergence or abuse  Severe COPD Trelegy is used as the maintenance inhaler albuterol is used as a rescue inhaler patient has cut back on tobacco use to 1 pack/day    Anxiety and depression improved with current regimen    Chronic smoker's cough is treated with Tessalon Perles    Peripheral neuropathy B6 50 mg daily improves function    Problem List Items Addressed This Visit        Other    Chronic bilateral low back pain without sciatica   Other Visit Diagnoses     Chronic obstructive pulmonary disease, unspecified COPD type ([facility] [address] )        Neuropathy        Chronic neck and back pain               Diagnoses and all orders for this visit:    Chronic bilateral low back pain without sciatica    Chronic obstructive pulmonary disease, unspecified COPD type ([address] )    Neuropathy    Chronic neck and back pain        No problem-specific Assessment & Plan notes found for this encounter  PHQ-2/9 Depression Screening            Body mass index is 23 41 kg/m²  BMI Counseling: The BMI   Subjective:      Patient ID: Hubert Moss is a 47 y o  female      Patient presents for checkup on chronic neck and low back pain      The following portions of the patient's history were reviewed and updated as appropriate:   She has a past medical history of Allergies, Anxiety, Asthma, Bereavement, Bipolar disorder ([address] ), Carpal tunnel syndrome, bilateral, Chronic bronchitis ([address] ), Chronic constipation, Chronic low back pain, Chronic neck pain, COPD (chronic obstructive pulmonary disease) ([address] ), Costochondritis, DDD (degenerative disc disease), cervical, Degenerative joint disease of left hip, Depression, Diverticulitis, DJD (degenerative joint disease) of knee, DJD (degenerative joint disease), ankle and foot, Edema, Fatigue, GABE (generalized anxiety disorder), GERD (gastroesophageal reflux disease), Hernia, inguinal, Hernia, umbilical, Hyperlipidemia, Hypertension, IBS (irritable bowel syndrome), Insomnia, Lower GI bleed, Migraine, Neuropathy, PTSD (post-traumatic stress disorder), Raynaud disease, Scalp psoriasis, Situational depression, and Tobacco dependence  ,  does not have any pertinent problems on file  ,   has a past surgical history that includes Chest tube insertion; DXA procedure(historical) (09/04/2009 & 8/24/2005); Mammo (historical) (02/14/2014); Hernia repair; and Cryotherapy  ,  family history includes Anxiety disorder in her other; Arthritis in her other; Cancer in her other; Depression in her other; Diabetes in her other; Heart disease in her other; No Known Problems in her maternal uncle, paternal uncle, and paternal uncle; Pulmonary fibrosis in her maternal aunt; Rheum arthritis in her maternal aunt; Stomach cancer in her father  ,   reports that she has been smoking  She started smoking about 41 years ago  She has been smoking an average of 2 packs per day  She has never used smokeless tobacco  She reports current alcohol use  She reports that she does not currently use drugs  ,  is allergic to erythromycin     Current Outpatient Medications   Medication Sig Dispense Refill   • atorvastatin (LIPITOR) 10 mg tablet Take 1 tablet (10 mg total) by mouth daily 90 tablet 1   • benzonatate (TESSALON) 200 MG capsule TAKE 1 TABLET BY MOUTH 3 TIMES A DAY AS NEEDED FOR COUGH 30 capsule 2   • Calcium Carbonate 1500 (600 Ca) MG TABS take 1 tablet by mouth twice a day with meals 180 tablet 1   • diclofenac (VOLTAREN) 75 mg EC tablet Take 1 tablet (75 mg total) by mouth 2 (two) times a day 60 tablet 5   • fluticasone-umeclidinium-vilanterol (Trelegy Ellipta) 200-62 5-25 MCG/INH AEPB inhaler Inhale 1 puff daily Rinse mouth after use   60 blister 5   • HYDROcodone-acetaminophen (NORCO) 7 5-325 mg per tablet Take 1 tablet by mouth every 6 (six) hours as needed for pain Max Daily Amount: 3 tablets 90 tablet 0   • hydrOXYzine pamoate (VISTARIL) 50 mg capsule Take 1 capsule (50 mg total) by mouth 3 (three) times a day as needed for itching 30 capsule 5   • loratadine (CLARITIN) 10 mg tablet Take 1 tablet (10 mg total) by mouth daily 90 tablet 1   • methocarbamol (ROBAXIN) 750 mg tablet take 1 tablet by mouth four times a day 120 tablet 3   • methylPREDNISolone 4 MG tablet therapy pack Use as directed on package (Patient not taking: Reported on 8/9/2022) 21 each 0   • methylPREDNISolone 4 MG tablet therapy pack Use as directed on package (Patient not taking: Reported on 2/24/2023) 21 each 0   • mirtazapine (REMERON) 45 MG tablet Take 1 tablet (45 mg total) by mouth daily at bedtime 30 tablet 2   • Multiple Vitamins-Minerals (MULTIVITAMIN ADULT PO) Take by mouth     • nicotine (NICODERM CQ) 21 mg/24 hr TD 24 hr patch Place 1 patch on the skin every 24 hours (Patient taking differently: Place 1 patch on the skin every 24 hours Uses off and on ) 28 patch 11   • Promethazine-DM (PHENERGAN-DM) 6 25-15 mg/5 mL oral syrup Take 5 mL by mouth 4 (four) times a day as needed for cough 180 mL 1   • pyridoxine (VITAMIN B6) 50 mg tablet Take 1 tablet (50 mg total) by mouth 3 (three) times a day 90 tablet 5   • QUEtiapine (SEROquel) 100 mg tablet Take 1 tablet (100 mg total) by mouth daily at bedtime 90 tablet 1   • topiramate (TOPAMAX) 50 MG tablet Take 50 mg by mouth 2 (two) times a day (Patient not taking: Reported on 3/11/2022 )     • triamcinolone (KENALOG) 0 1 % cream Apply topically 2 (two) times a day 30 g 0   • TURMERIC PO Take 1 tablet by mouth       No current facility-administered medications for this visit  Review of Systems   Constitutional: Negative for chills and fever  HENT: Negative for ear pain and sore throat  Eyes: Negative for pain and visual disturbance     Respiratory: Positive for shortness of breath and wheezing  Negative for cough  Cardiovascular: Negative for chest pain and palpitations  Gastrointestinal: Negative for abdominal pain and vomiting  Genitourinary: Negative for dysuria and hematuria  Musculoskeletal: Positive for back pain and neck pain  Negative for arthralgias  Skin: Negative for color change and rash  Neurological: Negative for seizures and syncope  All other systems reviewed and are negative  Objective:    /64   Pulse 84   Temp (!) 96 6 °F (35 9 °C)   Resp 20   Ht 5' 2" (1 575 m)   Wt 58 1 kg (128 lb)   BMI 23 41 kg/m²   Body mass index is 23 41 kg/m²  Physical Exam  Constitutional:       Appearance: Normal appearance  She is well-developed  HENT:      Head: Normocephalic and atraumatic  Right Ear: Tympanic membrane, ear canal and external ear normal       Left Ear: Tympanic membrane, ear canal and external ear normal       Nose: Nose normal       Mouth/Throat:      Mouth: Mucous membranes are moist       Pharynx: Oropharynx is clear  Eyes:      Extraocular Movements: Extraocular movements intact  Conjunctiva/sclera: Conjunctivae normal       Pupils: Pupils are equal, round, and reactive to light  Cardiovascular:      Rate and Rhythm: Normal rate and regular rhythm  Pulses: Normal pulses  Heart sounds: Normal heart sounds  Pulmonary:      Effort: Pulmonary effort is normal       Breath sounds: Normal breath sounds  Abdominal:      General: Abdomen is flat  Bowel sounds are normal       Palpations: Abdomen is soft  Tenderness: There is no abdominal tenderness  Musculoskeletal:         General: Normal range of motion  Cervical back: Normal range of motion and neck supple  Comments: Decreased range of motion of the cervical spine lumbar paraspinal spasm with bilateral SI tenderness   Skin:     General: Skin is warm and dry  Capillary Refill: Capillary refill takes less than 2 seconds  Neurological:      General: No focal deficit present  Mental Status: She is alert and oriented to person, place, and time  Psychiatric:         Mood and Affect: Mood normal          Behavior: Behavior normal          Thought Content:  Thought content normal          Judgment: Judgment normal

## 2023-03-18 DIAGNOSIS — G62.9 NEUROPATHY: ICD-10-CM

## 2023-03-20 RX ORDER — LANOLIN ALCOHOL/MO/W.PET/CERES
CREAM (GRAM) TOPICAL
Qty: 90 TABLET | Refills: 1 | Status: SHIPPED | OUTPATIENT
Start: 2023-03-20

## 2023-03-24 ENCOUNTER — OFFICE VISIT (OUTPATIENT)
Dept: FAMILY MEDICINE CLINIC | Facility: CLINIC | Age: 53
End: 2023-03-24

## 2023-03-24 VITALS
SYSTOLIC BLOOD PRESSURE: 124 MMHG | TEMPERATURE: 98.2 F | DIASTOLIC BLOOD PRESSURE: 68 MMHG | HEART RATE: 68 BPM | RESPIRATION RATE: 16 BRPM | BODY MASS INDEX: 23.55 KG/M2 | HEIGHT: 62 IN | WEIGHT: 128 LBS

## 2023-03-24 DIAGNOSIS — G89.29 CHRONIC NECK AND BACK PAIN: ICD-10-CM

## 2023-03-24 DIAGNOSIS — J40 BRONCHITIS: Primary | ICD-10-CM

## 2023-03-24 DIAGNOSIS — F33.41 RECURRENT MAJOR DEPRESSIVE DISORDER, IN PARTIAL REMISSION (HCC): ICD-10-CM

## 2023-03-24 DIAGNOSIS — F17.200 TOBACCO USE DISORDER: ICD-10-CM

## 2023-03-24 DIAGNOSIS — M54.9 CHRONIC NECK AND BACK PAIN: ICD-10-CM

## 2023-03-24 DIAGNOSIS — J45.40 MODERATE PERSISTENT ASTHMA, UNSPECIFIED WHETHER COMPLICATED: ICD-10-CM

## 2023-03-24 DIAGNOSIS — M54.2 CHRONIC NECK AND BACK PAIN: ICD-10-CM

## 2023-03-24 DIAGNOSIS — J44.9 CHRONIC OBSTRUCTIVE PULMONARY DISEASE, UNSPECIFIED COPD TYPE (HCC): ICD-10-CM

## 2023-03-24 DIAGNOSIS — T14.8XXA BRUISING: ICD-10-CM

## 2023-03-24 DIAGNOSIS — G62.9 NEUROPATHY: ICD-10-CM

## 2023-03-24 RX ORDER — HYDROCODONE BITARTRATE AND ACETAMINOPHEN 7.5; 325 MG/1; MG/1
1 TABLET ORAL EVERY 6 HOURS PRN
Qty: 90 TABLET | Refills: 0 | Status: SHIPPED | OUTPATIENT
Start: 2023-03-24

## 2023-03-24 RX ORDER — AZITHROMYCIN 250 MG/1
TABLET, FILM COATED ORAL
Qty: 6 TABLET | Refills: 0 | Status: SHIPPED | OUTPATIENT
Start: 2023-03-24 | End: 2023-03-29

## 2023-03-24 RX ORDER — MIRTAZAPINE 45 MG/1
45 TABLET, FILM COATED ORAL
Qty: 30 TABLET | Refills: 2 | Status: SHIPPED | OUTPATIENT
Start: 2023-03-24

## 2023-03-24 RX ORDER — DEXTROMETHORPHAN HYDROBROMIDE AND PROMETHAZINE HYDROCHLORIDE 15; 6.25 MG/5ML; MG/5ML
5 SOLUTION ORAL 4 TIMES DAILY PRN
Qty: 180 ML | Refills: 1 | Status: SHIPPED | OUTPATIENT
Start: 2023-03-24

## 2023-03-24 RX ORDER — METHYLPREDNISOLONE 4 MG/1
TABLET ORAL
Qty: 21 EACH | Refills: 0 | Status: SHIPPED | OUTPATIENT
Start: 2023-03-24

## 2023-03-24 NOTE — PROGRESS NOTES
Assessment/Plan: Acute bronchitis we will provide a Zithromax Z-EFRAIN Medrol Dosepak and Promethazine DM for symptomatic control    Chronic neck and low back pain with bilateral knee pain and bilateral foot pain treated with opioid therapy  Opioid therapy is used in conjunction with Voltaren 75 mg twice daily  The PDMP has been reviewed no issues found no evidence of diversion's or abuse  Severe COPD Trelegy is used as a maintenance inhaler albuterol is used as rescue inhaler the patient has cut back on tobacco use to 1 pack/day has been a nicotine replacement failure  Chronic smoker's cough is treated with Tessalon Perles    Peripheral neuropathy B6 50 mg daily improves    Patient has bruising of her right arm that she does not know of any trauma we will check a count with platelets  Recurrent major depression disorder in partial remission we will renew Remeron    Problem List Items Addressed This Visit    None  Visit Diagnoses     Recurrent major depressive disorder, in partial remission (HCC)        Chronic neck and back pain        Chronic obstructive pulmonary disease, unspecified [facility] type ([facility] )               Diagnoses and all orders for this visit:    Recurrent major depressive disorder, in partial remission ([facility] [address] )    Chronic neck and back pain    Chronic obstructive pulmonary disease, unspecified COPD type ([facility] [address] )        No problem-specific Assessment & Plan notes found for this encounter  PHQ-2/9 Depression Screening            Body mass index is 23 41 kg/m²  BMI Counseling: Body mass index is 23 41 kg/m²  The BMI   Subjective:      Patient ID: Lucina Thakur is a 47 y o  female      Patient presents for 1 month checkup      The following portions of the patient's history were reviewed and updated as appropriate:   She has a past medical history of Allergies, Anxiety, Asthma, Bereavement, Bipolar disorder ([address] ), Carpal tunnel syndrome, bilateral, Chronic bronchitis ([address] ), Chronic constipation, Chronic low back pain, Chronic neck pain, COPD (chronic obstructive pulmonary disease) (HCC), Costochondritis, DDD (degenerative disc disease), cervical, Degenerative joint disease of left hip, Depression, Diverticulitis, DJD (degenerative joint disease) of knee, DJD (degenerative joint disease), ankle and foot, Edema, Fatigue, GABE (generalized anxiety disorder), GERD (gastroesophageal reflux disease), Hernia, inguinal, Hernia, umbilical, Hyperlipidemia, Hypertension, IBS (irritable bowel syndrome), Insomnia, Lower GI bleed, Migraine, Neuropathy, PTSD (post-traumatic stress disorder), Raynaud disease, Scalp psoriasis, Situational depression, and Tobacco dependence  ,  does not have any pertinent problems on file  ,   has a past surgical history that includes Chest tube insertion; DXA procedure(historical) (09/04/2009 & 8/24/2005); Mammo (historical) (02/14/2014); Hernia repair; and Cryotherapy  ,  family history includes Anxiety disorder in her other; Arthritis in her other; Cancer in her other; Depression in her other; Diabetes in her other; Heart disease in her other; No Known Problems in her maternal uncle, paternal uncle, and paternal uncle; Pulmonary fibrosis in her maternal aunt; Rheum arthritis in her maternal aunt; Stomach cancer in her father  ,   reports that she has been smoking  She started smoking about 41 years ago  She has been smoking an average of 2 packs per day  She has never used smokeless tobacco  She reports current alcohol use  She reports that she does not currently use drugs  ,  is allergic to erythromycin     Current Outpatient Medications   Medication Sig Dispense Refill   • atorvastatin (LIPITOR) 10 mg tablet Take 1 tablet (10 mg total) by mouth daily 90 tablet 1   • benzonatate (TESSALON) 200 MG capsule Take 1 capsule (200 mg total) by mouth 3 (three) times a day as needed for cough 30 capsule 2   • Calcium Carbonate 1500 (600 Ca) MG TABS Take 1 tablet by mouth 2 (two) times a day with meals 180 tablet 1   • diclofenac (VOLTAREN) 75 mg EC tablet Take 1 tablet (75 mg total) by mouth 2 (two) times a day 60 tablet 5   • fluticasone-umeclidinium-vilanterol (Trelegy Ellipta) 200-62 5-25 mcg/actuation AEPB inhaler Inhale 1 puff daily Rinse mouth after use   60 blister 5   • HYDROcodone-acetaminophen (NORCO) 7 5-325 mg per tablet Take 1 tablet by mouth every 6 (six) hours as needed for pain Max Daily Amount: 3 tablets 90 tablet 0   • hydrOXYzine pamoate (VISTARIL) 50 mg capsule Take 1 capsule (50 mg total) by mouth 3 (three) times a day as needed for itching 30 capsule 5   • loratadine (CLARITIN) 10 mg tablet Take 1 tablet (10 mg total) by mouth daily 90 tablet 1   • methocarbamol (ROBAXIN) 750 mg tablet take 1 tablet by mouth four times a day 120 tablet 3   • methylPREDNISolone 4 MG tablet therapy pack Use as directed on package (Patient not taking: Reported on 8/9/2022) 21 each 0   • methylPREDNISolone 4 MG tablet therapy pack Use as directed on package (Patient not taking: Reported on 2/24/2023) 21 each 0   • mirtazapine (REMERON) 45 MG tablet Take 1 tablet (45 mg total) by mouth daily at bedtime 30 tablet 2   • Multiple Vitamins-Minerals (MULTIVITAMIN ADULT PO) Take by mouth     • nicotine (NICODERM CQ) 21 mg/24 hr TD 24 hr patch Place 1 patch on the skin every 24 hours (Patient taking differently: Place 1 patch on the skin every 24 hours Uses off and on ) 28 patch 11   • Promethazine-DM (PHENERGAN-DM) 6 25-15 mg/5 mL oral syrup Take 5 mL by mouth 4 (four) times a day as needed for cough (Patient not taking: Reported on 3/24/2023) 180 mL 1   • pyridoxine (VITAMIN B6) 50 mg tablet take 1 tablet by mouth three times a day 90 tablet 1   • QUEtiapine (SEROquel) 100 mg tablet Take 1 tablet (100 mg total) by mouth daily at bedtime 90 tablet 1   • topiramate (TOPAMAX) 50 MG tablet Take 50 mg by mouth 2 (two) times a day (Patient not taking: Reported on 3/11/2022 )     • triamcinolone (KENALOG) 0 1 % cream Apply topically 2 (two) times a day 30 g 0   • TURMERIC PO Take 1 tablet by mouth       No current facility-administered medications for this visit  Review of Systems   Constitutional: Negative for chills and fever  HENT: Positive for sore throat  Negative for ear pain  Eyes: Negative for pain and visual disturbance  Respiratory: Positive for cough and wheezing  Negative for shortness of breath  Cardiovascular: Negative for chest pain and palpitations  Gastrointestinal: Negative for abdominal pain and vomiting  Genitourinary: Negative for dysuria and hematuria  Musculoskeletal: Negative for arthralgias and back pain  Skin: Negative for color change and rash  Neurological: Negative for seizures and syncope  All other systems reviewed and are negative  Objective:    /68   Pulse 68   Temp 98 2 °F (36 8 °C)   Resp 16   Ht 5' 2" (1 575 m)   Wt 58 1 kg (128 lb)   BMI 23 41 kg/m²   Body mass index is 23 41 kg/m²  Physical Exam  Constitutional:       Appearance: Normal appearance  She is well-developed  HENT:      Head: Normocephalic and atraumatic  Right Ear: Tympanic membrane, ear canal and external ear normal       Left Ear: Tympanic membrane, ear canal and external ear normal       Nose: Nose normal       Mouth/Throat:      Mouth: Mucous membranes are moist       Pharynx: Oropharynx is clear  Eyes:      Extraocular Movements: Extraocular movements intact  Conjunctiva/sclera: Conjunctivae normal       Pupils: Pupils are equal, round, and reactive to light  Cardiovascular:      Rate and Rhythm: Normal rate and regular rhythm  Pulses: Normal pulses  Heart sounds: Normal heart sounds  Pulmonary:      Effort: Pulmonary effort is normal       Breath sounds: Normal breath sounds  Abdominal:      General: Abdomen is flat  Bowel sounds are normal       Palpations: Abdomen is soft  Tenderness: There is no abdominal tenderness     Musculoskeletal: General: Normal range of motion  Cervical back: Normal range of motion and neck supple  Skin:     General: Skin is warm and dry  Capillary Refill: Capillary refill takes less than 2 seconds  Findings: Bruising present  Neurological:      General: No focal deficit present  Mental Status: She is alert and oriented to person, place, and time  Psychiatric:         Mood and Affect: Mood normal          Behavior: Behavior normal          Thought Content:  Thought content normal          Judgment: Judgment normal

## 2023-04-24 ENCOUNTER — OFFICE VISIT (OUTPATIENT)
Dept: FAMILY MEDICINE CLINIC | Facility: CLINIC | Age: 53
End: 2023-04-24

## 2023-04-24 VITALS
BODY MASS INDEX: 24.29 KG/M2 | TEMPERATURE: 97.9 F | SYSTOLIC BLOOD PRESSURE: 124 MMHG | DIASTOLIC BLOOD PRESSURE: 72 MMHG | WEIGHT: 132 LBS | RESPIRATION RATE: 16 BRPM | HEART RATE: 76 BPM | HEIGHT: 62 IN

## 2023-04-24 DIAGNOSIS — G89.29 CHRONIC NECK AND BACK PAIN: ICD-10-CM

## 2023-04-24 DIAGNOSIS — G62.9 NEUROPATHY: ICD-10-CM

## 2023-04-24 DIAGNOSIS — M54.2 CHRONIC NECK AND BACK PAIN: ICD-10-CM

## 2023-04-24 DIAGNOSIS — F33.41 RECURRENT MAJOR DEPRESSIVE DISORDER, IN PARTIAL REMISSION (HCC): ICD-10-CM

## 2023-04-24 DIAGNOSIS — R49.0 HOARSENESS OF VOICE: Primary | ICD-10-CM

## 2023-04-24 DIAGNOSIS — M54.9 CHRONIC NECK AND BACK PAIN: ICD-10-CM

## 2023-04-24 DIAGNOSIS — F17.200 TOBACCO USE DISORDER: ICD-10-CM

## 2023-04-24 DIAGNOSIS — E78.2 MIXED HYPERLIPIDEMIA: ICD-10-CM

## 2023-04-24 DIAGNOSIS — J44.9 CHRONIC OBSTRUCTIVE PULMONARY DISEASE, UNSPECIFIED COPD TYPE (HCC): ICD-10-CM

## 2023-04-24 RX ORDER — HYDROCODONE BITARTRATE AND ACETAMINOPHEN 7.5; 325 MG/1; MG/1
1 TABLET ORAL EVERY 6 HOURS PRN
Qty: 90 TABLET | Refills: 0 | Status: SHIPPED | OUTPATIENT
Start: 2023-04-24

## 2023-04-24 RX ORDER — ATORVASTATIN CALCIUM 10 MG/1
10 TABLET, FILM COATED ORAL DAILY
Qty: 90 TABLET | Refills: 1 | Status: SHIPPED | OUTPATIENT
Start: 2023-04-24

## 2023-04-24 NOTE — PROGRESS NOTES
Assessment/Plan:    Patient was seen recently with bronchitis continues with hoarseness of voice offered the patient a referral to ENT the patient defers at this time  Chronic neck and low back pain with bilateral knee pain and bilateral foot pain treated with opioid therapy  Opioid therapy is used in conjunction with Voltaren 75 mg twice daily  The PDMP has been reviewed no issues found no evidence of diversion's or abuse  Chronic smoker's cough is treated with Tessalon Perles    Peripheral neuropathy B6 improves function    Recurrent major depressive disorder in partial remission on Remeron    Tobacco use disorder the patient is smoking approximately a pack and 1/2 cigarettes daily she states she will reinstitute the nicotine patch    Problem List Items Addressed This Visit        Other    Tobacco use disorder    Mixed hyperlipidemia    Relevant Medications    atorvastatin (LIPITOR) 10 mg tablet   Other Visit Diagnoses     Hoarseness of voice    -  Primary    Chronic neck and back pain        Relevant Medications    HYDROcodone-acetaminophen (NORCO) 7 5-325 mg per tablet    Chronic obstructive pulmonary disease, unspecified COPD type ([facility] [address] )        Recurrent major depressive disorder, in partial remission ([facility] )        Neuropathy               Diagnoses and all orders for this visit:    Hoarseness of voice    Chronic neck and back pain  -     HYDROcodone-acetaminophen (NORCO) 7 5-325 mg per tablet; Take 1 tablet by mouth every 6 (six) hours as needed for pain Max Daily Amount: 3 tablets    Chronic obstructive pulmonary disease, unspecified COPD type (HCC)    Mixed hyperlipidemia  -     atorvastatin (LIPITOR) 10 mg tablet; Take 1 tablet (10 mg total) by mouth daily    Recurrent major depressive disorder, in partial remission (HCC)    Tobacco use disorder    Neuropathy      No problem-specific Assessment & Plan notes found for this encounter        PHQ-2/9 Depression Screening            Body mass index is 24 14 kg/m²  BMI Counseling: Body mass index is 24 14 kg/m²  The BMI     Subjective:      Patient ID: Naila Mayo is a 47 y o  female  Patient presents for checkup on chronic neck and low back pain      The following portions of the patient's history were reviewed and updated as appropriate:   She has a past medical history of Allergies, Anxiety, Asthma, Bereavement, Bipolar disorder ([address] ), Carpal tunnel syndrome, bilateral, Chronic bronchitis ([facility] [address] ), Chronic constipation, Chronic low back pain, Chronic neck pain, COPD (chronic obstructive pulmonary disease) ([address] ), Costochondritis, DDD (degenerative disc disease), cervical, Degenerative joint disease of left hip, Depression, Diverticulitis, DJD (degenerative joint disease) of knee, DJD (degenerative joint disease), ankle and foot, Edema, Fatigue, GABE (generalized anxiety disorder), GERD (gastroesophageal reflux disease), Hernia, inguinal, Hernia, umbilical, Hyperlipidemia, Hypertension, IBS (irritable bowel syndrome), Insomnia, Lower GI bleed, Migraine, Neuropathy, PTSD (post-traumatic stress disorder), Raynaud disease, Scalp psoriasis, Situational depression, and Tobacco dependence  ,  does not have any pertinent problems on file  ,   has a past surgical history that includes Chest tube insertion; DXA procedure(historical) (09/04/2009 & 8/24/2005); Mammo (historical) (02/14/2014); Hernia repair; and Cryotherapy  ,  family history includes Anxiety disorder in her other; Arthritis in her other; Cancer in her other; Depression in her other; Diabetes in her other; Heart disease in her other; No Known Problems in her maternal uncle, paternal uncle, and paternal uncle; Pulmonary fibrosis in her maternal aunt; Rheum arthritis in her maternal aunt; Stomach cancer in her father  ,   reports that she has been smoking  She started smoking about 41 years ago  She has been smoking an average of 2 packs per day  She has never used smokeless tobacco  She reports current alcohol use  She reports that she does not currently use drugs  ,  is allergic to erythromycin     Current Outpatient Medications   Medication Sig Dispense Refill   • atorvastatin (LIPITOR) 10 mg tablet Take 1 tablet (10 mg total) by mouth daily 90 tablet 1   • HYDROcodone-acetaminophen (NORCO) 7 5-325 mg per tablet Take 1 tablet by mouth every 6 (six) hours as needed for pain Max Daily Amount: 3 tablets 90 tablet 0   • benzonatate (TESSALON) 200 MG capsule Take 1 capsule (200 mg total) by mouth 3 (three) times a day as needed for cough 30 capsule 2   • Calcium Carbonate 1500 (600 Ca) MG TABS Take 1 tablet by mouth 2 (two) times a day with meals 180 tablet 1   • diclofenac (VOLTAREN) 75 mg EC tablet Take 1 tablet (75 mg total) by mouth 2 (two) times a day 60 tablet 5   • fluticasone-umeclidinium-vilanterol (Trelegy Ellipta) 200-62 5-25 mcg/actuation AEPB inhaler Inhale 1 puff daily Rinse mouth after use   60 blister 5   • hydrOXYzine pamoate (VISTARIL) 50 mg capsule Take 1 capsule (50 mg total) by mouth 3 (three) times a day as needed for itching 30 capsule 5   • loratadine (CLARITIN) 10 mg tablet Take 1 tablet (10 mg total) by mouth daily 90 tablet 1   • methocarbamol (ROBAXIN) 750 mg tablet take 1 tablet by mouth four times a day 120 tablet 3   • methylPREDNISolone 4 MG tablet therapy pack Use as directed on package 21 each 0   • mirtazapine (REMERON) 45 MG tablet Take 1 tablet (45 mg total) by mouth daily at bedtime 30 tablet 2   • Multiple Vitamins-Minerals (MULTIVITAMIN ADULT PO) Take by mouth     • nicotine (NICODERM CQ) 21 mg/24 hr TD 24 hr patch Place 1 patch on the skin every 24 hours (Patient taking differently: Place 1 patch on the skin every 24 hours Uses off and on ) 28 patch 11   • Promethazine-DM (PHENERGAN-DM) 6 25-15 mg/5 mL oral syrup Take 5 mL by mouth 4 (four) times a day as needed for cough 180 mL 1   • pyridoxine (VITAMIN B6) 50 mg tablet take 1 tablet by mouth three times a day 90 tablet 1   • QUEtiapine "(SEROquel) 100 mg tablet Take 1 tablet (100 mg total) by mouth daily at bedtime 90 tablet 1   • topiramate (TOPAMAX) 50 MG tablet Take 50 mg by mouth 2 (two) times a day (Patient not taking: Reported on 3/11/2022 )     • triamcinolone (KENALOG) 0 1 % cream Apply topically 2 (two) times a day 30 g 0   • TURMERIC PO Take 1 tablet by mouth       No current facility-administered medications for this visit  Review of Systems   Constitutional: Negative for chills and fever  HENT: Positive for voice change  Negative for ear pain and sore throat  Eyes: Negative for pain and visual disturbance  Respiratory: Positive for shortness of breath and wheezing  Negative for cough  Cardiovascular: Negative for chest pain and palpitations  Gastrointestinal: Negative for abdominal pain and vomiting  Genitourinary: Negative for dysuria and hematuria  Musculoskeletal: Positive for back pain and neck pain  Negative for arthralgias  Skin: Negative for color change and rash  Neurological: Negative for seizures and syncope  All other systems reviewed and are negative  Objective:    /72   Pulse 76   Temp 97 9 °F (36 6 °C)   Resp 16   Ht 5' 2\" (1 575 m)   Wt 59 9 kg (132 lb)   BMI 24 14 kg/m²   Body mass index is 24 14 kg/m²  Physical Exam  Constitutional:       Appearance: Normal appearance  She is well-developed  HENT:      Head: Normocephalic and atraumatic  Right Ear: Tympanic membrane, ear canal and external ear normal       Left Ear: Tympanic membrane, ear canal and external ear normal       Nose: Nose normal       Mouth/Throat:      Mouth: Mucous membranes are moist       Pharynx: Oropharynx is clear  Eyes:      Extraocular Movements: Extraocular movements intact  Conjunctiva/sclera: Conjunctivae normal       Pupils: Pupils are equal, round, and reactive to light  Cardiovascular:      Rate and Rhythm: Normal rate and regular rhythm  Pulses: Normal pulses        Heart " sounds: Normal heart sounds  Pulmonary:      Effort: Pulmonary effort is normal       Breath sounds: Rhonchi present  Abdominal:      General: Abdomen is flat  Bowel sounds are normal       Palpations: Abdomen is soft  Tenderness: There is no abdominal tenderness  Musculoskeletal:         General: Normal range of motion  Cervical back: Normal range of motion and neck supple  Comments: Cervical paraspinal spasm lumbar paraspinal spasm bilateral SI tenderness and negative leg raising   Skin:     General: Skin is warm and dry  Capillary Refill: Capillary refill takes less than 2 seconds  Neurological:      General: No focal deficit present  Mental Status: She is alert and oriented to person, place, and time  Psychiatric:         Mood and Affect: Mood normal          Behavior: Behavior normal          Thought Content:  Thought content normal          Judgment: Judgment normal

## 2023-05-22 ENCOUNTER — OFFICE VISIT (OUTPATIENT)
Dept: FAMILY MEDICINE CLINIC | Facility: CLINIC | Age: 53
End: 2023-05-22

## 2023-05-22 VITALS
WEIGHT: 129 LBS | TEMPERATURE: 98.6 F | BODY MASS INDEX: 23.74 KG/M2 | RESPIRATION RATE: 20 BRPM | SYSTOLIC BLOOD PRESSURE: 134 MMHG | HEART RATE: 84 BPM | HEIGHT: 62 IN | DIASTOLIC BLOOD PRESSURE: 80 MMHG

## 2023-05-22 DIAGNOSIS — J44.9 CHRONIC OBSTRUCTIVE PULMONARY DISEASE, UNSPECIFIED COPD TYPE (HCC): ICD-10-CM

## 2023-05-22 DIAGNOSIS — G62.9 NEUROPATHY: ICD-10-CM

## 2023-05-22 DIAGNOSIS — M54.2 CHRONIC NECK AND BACK PAIN: ICD-10-CM

## 2023-05-22 DIAGNOSIS — F33.41 RECURRENT MAJOR DEPRESSIVE DISORDER, IN PARTIAL REMISSION (HCC): ICD-10-CM

## 2023-05-22 DIAGNOSIS — M54.9 CHRONIC NECK AND BACK PAIN: ICD-10-CM

## 2023-05-22 DIAGNOSIS — G89.29 CHRONIC BILATERAL LOW BACK PAIN WITHOUT SCIATICA: Primary | ICD-10-CM

## 2023-05-22 DIAGNOSIS — M54.50 CHRONIC BILATERAL LOW BACK PAIN WITHOUT SCIATICA: Primary | ICD-10-CM

## 2023-05-22 DIAGNOSIS — R05.3 CHRONIC COUGH: ICD-10-CM

## 2023-05-22 DIAGNOSIS — F17.200 TOBACCO USE DISORDER: ICD-10-CM

## 2023-05-22 DIAGNOSIS — G89.29 CHRONIC NECK AND BACK PAIN: ICD-10-CM

## 2023-05-22 RX ORDER — METHOCARBAMOL 750 MG/1
750 TABLET, FILM COATED ORAL 4 TIMES DAILY
Qty: 120 TABLET | Refills: 3 | Status: SHIPPED | OUTPATIENT
Start: 2023-05-22

## 2023-05-22 RX ORDER — HYDROCODONE BITARTRATE AND ACETAMINOPHEN 7.5; 325 MG/1; MG/1
1 TABLET ORAL EVERY 6 HOURS PRN
Qty: 90 TABLET | Refills: 0 | Status: SHIPPED | OUTPATIENT
Start: 2023-05-22

## 2023-05-22 RX ORDER — BENZONATATE 200 MG/1
200 CAPSULE ORAL 3 TIMES DAILY PRN
Qty: 30 CAPSULE | Refills: 2 | Status: SHIPPED | OUTPATIENT
Start: 2023-05-22

## 2023-05-22 NOTE — PROGRESS NOTES
Assessment/Plan: Chronic neck and low back pain with bilateral knee pain and bilateral foot pain treated with opioid therapy  Opioid therapy is used in conjunction with Voltaren 75 mg twice daily  The PDMP has been reviewed and was found no evidence of divergence or abuse    Chronic smoker's cough treated with Tessalon Perles    Peripheral neuropathy B6 improves function    Recurrent major depression disorder in partial remission on Remeron    COPD with asthmatic component the patient uses Trelegy as a maintenance inhaler and albuterol as a rescue inhaler    Tobacco use disorder the patient is smoking approximately a pack and half of cigarettes daily        Problem List Items Addressed This Visit        Other    Chronic bilateral low back pain without sciatica   Other Visit Diagnoses     Chronic obstructive pulmonary disease, unspecified COPD type ([facility] [address] )        Chronic neck and back pain               Diagnoses and all orders for this visit:    Chronic obstructive pulmonary disease, unspecified COPD type ([facility] [address] )    Chronic bilateral low back pain without sciatica    Chronic neck and back pain        No problem-specific Assessment & Plan notes found for this encounter  PHQ-2/9 Depression Screening            Body mass index is 23 59 kg/m²  BMI Counseling: Body mass index is 23 59 kg/m²  The BMI   Subjective:      Patient ID: Marguerite Lloyd is a 47 y o  female      HPI    The following portions of the patient's history were reviewed and updated as appropriate:   She has a past medical history of Allergies, Anxiety, Asthma, Bereavement, Bipolar disorder ([address] ), Carpal tunnel syndrome, bilateral, Chronic bronchitis ([address] ), Chronic constipation, Chronic low back pain, Chronic neck pain, COPD (chronic obstructive pulmonary disease) ([address] ), Costochondritis, DDD (degenerative disc disease), cervical, Degenerative joint disease of left hip, Depression, Diverticulitis, DJD (degenerative joint disease) of knee, DJD (degenerative joint disease), ankle and foot, Edema, Fatigue, GABE (generalized anxiety disorder), GERD (gastroesophageal reflux disease), Hernia, inguinal, Hernia, umbilical, Hyperlipidemia, Hypertension, IBS (irritable bowel syndrome), Insomnia, Lower GI bleed, Migraine, Neuropathy, PTSD (post-traumatic stress disorder), Raynaud disease, Scalp psoriasis, Situational depression, and Tobacco dependence  ,  does not have any pertinent problems on file  ,   has a past surgical history that includes Chest tube insertion; DXA procedure(historical) (09/04/2009 & 8/24/2005); Mammo (historical) (02/14/2014); Hernia repair; and Cryotherapy  ,  family history includes Anxiety disorder in her other; Arthritis in her other; Cancer in her other; Depression in her other; Diabetes in her other; Heart disease in her other; No Known Problems in her maternal uncle, paternal uncle, and paternal uncle; Pulmonary fibrosis in her maternal aunt; Rheum arthritis in her maternal aunt; Stomach cancer in her father  ,   reports that she has been smoking  She started smoking about 41 years ago  She has been smoking an average of 2 packs per day  She has never used smokeless tobacco  She reports current alcohol use  She reports that she does not currently use drugs  ,  is allergic to erythromycin     Current Outpatient Medications   Medication Sig Dispense Refill   • atorvastatin (LIPITOR) 10 mg tablet Take 1 tablet (10 mg total) by mouth daily 90 tablet 1   • benzonatate (TESSALON) 200 MG capsule Take 1 capsule (200 mg total) by mouth 3 (three) times a day as needed for cough 30 capsule 2   • Calcium Carbonate 1500 (600 Ca) MG TABS Take 1 tablet by mouth 2 (two) times a day with meals 180 tablet 1   • diclofenac (VOLTAREN) 75 mg EC tablet Take 1 tablet (75 mg total) by mouth 2 (two) times a day 60 tablet 5   • fluticasone-umeclidinium-vilanterol (Trelegy Ellipta) 200-62 5-25 mcg/actuation AEPB inhaler Inhale 1 puff daily Rinse mouth after use   61 blister 5   • HYDROcodone-acetaminophen (NORCO) 7 5-325 mg per tablet Take 1 tablet by mouth every 6 (six) hours as needed for pain Max Daily Amount: 3 tablets 90 tablet 0   • hydrOXYzine pamoate (VISTARIL) 50 mg capsule Take 1 capsule (50 mg total) by mouth 3 (three) times a day as needed for itching 30 capsule 5   • loratadine (CLARITIN) 10 mg tablet Take 1 tablet (10 mg total) by mouth daily 90 tablet 1   • methocarbamol (ROBAXIN) 750 mg tablet take 1 tablet by mouth four times a day 120 tablet 3   • methylPREDNISolone 4 MG tablet therapy pack Use as directed on package (Patient not taking: Reported on 5/22/2023) 21 each 0   • mirtazapine (REMERON) 45 MG tablet Take 1 tablet (45 mg total) by mouth daily at bedtime 30 tablet 2   • Multiple Vitamins-Minerals (MULTIVITAMIN ADULT PO) Take by mouth     • nicotine (NICODERM CQ) 21 mg/24 hr TD 24 hr patch Place 1 patch on the skin every 24 hours (Patient taking differently: Place 1 patch on the skin every 24 hours Uses off and on ) 28 patch 11   • Promethazine-DM (PHENERGAN-DM) 6 25-15 mg/5 mL oral syrup Take 5 mL by mouth 4 (four) times a day as needed for cough (Patient not taking: Reported on 5/22/2023) 180 mL 1   • pyridoxine (VITAMIN B6) 50 mg tablet take 1 tablet by mouth three times a day 90 tablet 1   • QUEtiapine (SEROquel) 100 mg tablet Take 1 tablet (100 mg total) by mouth daily at bedtime 90 tablet 1   • topiramate (TOPAMAX) 50 MG tablet Take 50 mg by mouth 2 (two) times a day (Patient not taking: Reported on 3/11/2022 )     • triamcinolone (KENALOG) 0 1 % cream Apply topically 2 (two) times a day 30 g 0   • TURMERIC PO Take 1 tablet by mouth       No current facility-administered medications for this visit  Review of Systems   Constitutional: Negative for chills and fever  HENT: Negative for ear pain and sore throat  Eyes: Negative for pain and visual disturbance  Respiratory: Positive for cough, shortness of breath and wheezing  "  Cardiovascular: Negative for chest pain and palpitations  Gastrointestinal: Negative for abdominal pain and vomiting  Genitourinary: Negative for dysuria and hematuria  Musculoskeletal: Positive for arthralgias, back pain, neck pain and neck stiffness  Skin: Negative for color change and rash  Neurological: Negative for seizures and syncope  All other systems reviewed and are negative  Objective:    /80   Pulse 84   Temp 98 6 °F (37 °C)   Resp 20   Ht 5' 2\" (1 575 m)   Wt 58 5 kg (129 lb)   BMI 23 59 kg/m²   Body mass index is 23 59 kg/m²  Physical Exam  Constitutional:       Appearance: Normal appearance  She is well-developed  HENT:      Head: Normocephalic and atraumatic  Right Ear: Tympanic membrane, ear canal and external ear normal       Left Ear: Tympanic membrane, ear canal and external ear normal       Nose: Nose normal       Mouth/Throat:      Mouth: Mucous membranes are moist       Pharynx: Oropharynx is clear  Eyes:      Extraocular Movements: Extraocular movements intact  Conjunctiva/sclera: Conjunctivae normal       Pupils: Pupils are equal, round, and reactive to light  Cardiovascular:      Rate and Rhythm: Normal rate and regular rhythm  Pulses: Normal pulses  Heart sounds: Normal heart sounds  Pulmonary:      Effort: Pulmonary effort is normal       Breath sounds: Normal breath sounds  Abdominal:      General: Abdomen is flat  Bowel sounds are normal       Palpations: Abdomen is soft  Tenderness: There is no abdominal tenderness  Musculoskeletal:         General: Normal range of motion  Cervical back: Normal range of motion and neck supple  Comments: Lumbar paraspinal spasm multiple trigger points of the cervical spine mild joint effusion of the knees and ankles bilaterally   Skin:     General: Skin is warm and dry  Capillary Refill: Capillary refill takes less than 2 seconds     Neurological:      General: No " focal deficit present  Mental Status: She is alert and oriented to person, place, and time  Psychiatric:         Mood and Affect: Mood normal          Behavior: Behavior normal          Thought Content:  Thought content normal          Judgment: Judgment normal

## 2023-06-20 ENCOUNTER — OFFICE VISIT (OUTPATIENT)
Dept: FAMILY MEDICINE CLINIC | Facility: CLINIC | Age: 53
End: 2023-06-20
Payer: COMMERCIAL

## 2023-06-20 VITALS
HEART RATE: 84 BPM | DIASTOLIC BLOOD PRESSURE: 64 MMHG | TEMPERATURE: 97.7 F | SYSTOLIC BLOOD PRESSURE: 120 MMHG | RESPIRATION RATE: 20 BRPM | HEIGHT: 62 IN | WEIGHT: 134 LBS | BODY MASS INDEX: 24.66 KG/M2

## 2023-06-20 DIAGNOSIS — G89.29 CHRONIC NECK AND BACK PAIN: Primary | ICD-10-CM

## 2023-06-20 DIAGNOSIS — Z79.891 OPIOID USE AGREEMENT EXISTS: ICD-10-CM

## 2023-06-20 DIAGNOSIS — M54.2 CHRONIC NECK AND BACK PAIN: Primary | ICD-10-CM

## 2023-06-20 DIAGNOSIS — F32.A ANXIETY AND DEPRESSION: ICD-10-CM

## 2023-06-20 DIAGNOSIS — J44.9 CHRONIC OBSTRUCTIVE PULMONARY DISEASE, UNSPECIFIED COPD TYPE (HCC): ICD-10-CM

## 2023-06-20 DIAGNOSIS — F33.41 RECURRENT MAJOR DEPRESSIVE DISORDER, IN PARTIAL REMISSION (HCC): ICD-10-CM

## 2023-06-20 DIAGNOSIS — M54.9 CHRONIC NECK AND BACK PAIN: Primary | ICD-10-CM

## 2023-06-20 DIAGNOSIS — Z00.00 ANNUAL PHYSICAL EXAM: ICD-10-CM

## 2023-06-20 DIAGNOSIS — Z88.9 HISTORY OF SEASONAL ALLERGIES: ICD-10-CM

## 2023-06-20 DIAGNOSIS — J30.89 ENVIRONMENTAL AND SEASONAL ALLERGIES: ICD-10-CM

## 2023-06-20 DIAGNOSIS — F11.20 OPIOID TYPE DEPENDENCE, CONTINUOUS (HCC): ICD-10-CM

## 2023-06-20 DIAGNOSIS — F41.9 ANXIETY AND DEPRESSION: ICD-10-CM

## 2023-06-20 DIAGNOSIS — R05.3 CHRONIC COUGH: ICD-10-CM

## 2023-06-20 PROCEDURE — 99214 OFFICE O/P EST MOD 30 MIN: CPT | Performed by: FAMILY MEDICINE

## 2023-06-20 PROCEDURE — 99396 PREV VISIT EST AGE 40-64: CPT | Performed by: FAMILY MEDICINE

## 2023-06-20 RX ORDER — MONTELUKAST SODIUM 10 MG/1
10 TABLET ORAL
Qty: 90 TABLET | Refills: 3 | Status: SHIPPED | OUTPATIENT
Start: 2023-06-20

## 2023-06-20 RX ORDER — MIRTAZAPINE 45 MG/1
45 TABLET, FILM COATED ORAL
Qty: 30 TABLET | Refills: 2 | Status: SHIPPED | OUTPATIENT
Start: 2023-06-20

## 2023-06-20 RX ORDER — HYDROCODONE BITARTRATE AND ACETAMINOPHEN 7.5; 325 MG/1; MG/1
1 TABLET ORAL EVERY 6 HOURS PRN
Qty: 90 TABLET | Refills: 0 | Status: SHIPPED | OUTPATIENT
Start: 2023-06-20

## 2023-06-20 RX ORDER — LORATADINE 10 MG/1
10 TABLET ORAL DAILY
Qty: 90 TABLET | Refills: 1 | Status: SHIPPED | OUTPATIENT
Start: 2023-06-20

## 2023-06-20 RX ORDER — ALBUTEROL SULFATE 2.5 MG/3ML
2.5 SOLUTION RESPIRATORY (INHALATION) EVERY 6 HOURS PRN
Qty: 180 ML | Refills: 5 | Status: SHIPPED | OUTPATIENT
Start: 2023-06-20

## 2023-06-20 RX ORDER — OLOPATADINE HYDROCHLORIDE 1 MG/ML
1 SOLUTION/ DROPS OPHTHALMIC 2 TIMES DAILY
Qty: 5 ML | Refills: 3 | Status: SHIPPED | OUTPATIENT
Start: 2023-06-20

## 2023-06-20 NOTE — PROGRESS NOTES
ADULT ANNUAL [id number] [address] PRIMARY CARE    NAME: Leobardo Mariano  AGE: 47 y o  SEX: female  : 1970     DATE: 2023     Assessment and Plan: Current allergies not controlled with Claritin we will add Singulair and Patanol eyedrops    Chronic neck and low back pain with bilateral knee pain and bilateral foot pain treated with opioid therapy  He will  Therapy is used in conjunction with Voltaren 75 mg twice daily  The PDMP has been reviewed no evidence of diversion's or abuse found no issues found      COPD mixed type with chronic smoker's cough treated with Tessalon Perles Trelegy is the maintenance inhaler and albuterol the resque inhaler    Tobacco use disorder the patient is smoking approximately a pack and half of cigarettes per day does have a nicotine patch at home and uses it on occasion    Recurrent major depressive disorder treated in partial remission    With Remeron 45 mg     Problem List Items Addressed This Visit        Other    Opioid use agreement exists    Relevant Orders    Toxicology screen, urine    Hydrocodone and Metabolites, Urine   Other Visit Diagnoses     Chronic neck and back pain    -  Primary    Relevant Medications    HYDROcodone-acetaminophen (NORCO) 7 5-325 mg per tablet    Opioid type dependence, continuous ([facility] [address] )        Relevant Orders    Toxicology screen, urine    Hydrocodone and Metabolites, Urine    Recurrent major depressive disorder, in partial remission (HCC)        Relevant Medications    mirtazapine (REMERON) 45 MG tablet    History of seasonal allergies        Relevant Medications    loratadine (CLARITIN) 10 mg tablet    Chronic obstructive pulmonary disease, unspecified COPD type (HCC)        Relevant Medications    loratadine (CLARITIN) 10 mg tablet    montelukast (SINGULAIR) 10 mg tablet    albuterol (2 5 mg/3 mL) 0 083 % nebulizer solution    Chronic cough        Anxiety and depression        Relevant Medications    mirtazapine (REMERON) 45 MG tablet    Annual physical exam        Environmental and seasonal allergies        Relevant Medications    montelukast (SINGULAIR) 10 mg tablet    olopatadine (PATANOL) 0 1 % ophthalmic solution          Immunizations and preventive care screenings were discussed with patient today  Appropriate education was printed on patient's after visit summary  Counseling:  • Alcohol/drug use: discussed moderation in alcohol intake, the recommendations for healthy alcohol use, and avoidance of illicit drug use  • Dental Health: discussed importance of regular tooth brushing, flossing, and dental visits  • Injury prevention: discussed safety/seat belts, safety helmets, smoke detectors, carbon dioxide detectors, and smoking near bedding or upholstery  • Sexual health: discussed sexually transmitted diseases, partner selection, use of condoms, avoidance of unintended pregnancy, and contraceptive alternatives  · Exercise: the importance of regular exercise/physical activity was discussed  Recommend exercise 3-5 times per week for at least 30 minutes  Tobacco Cessation Counseling: Tobacco cessation counseling was provided  The patient is sincerely urged to quit consumption of tobacco  She is not ready to quit tobacco  Medication options and side effects of medication discussed  Patient agreed to medication  The patient has been a nicotine replacement failure and is not a candidate for Chantix or Wellbutrin        Return in about 4 weeks (around 7/18/2023)  Chief Complaint:     Chief Complaint   Patient presents with   • Annual Exam   • Follow-up     One month FOLLOW UP    • Medication Refill      History of Present Illness:     Adult Annual Physical   Patient here for a comprehensive physical exam  The patient reports no problems  Diet and Physical Activity  · Diet/Nutrition: poor diet  · Exercise: walking, 5-7 times a week on average and 30-60 minutes on average  Depression Screening  PHQ-2/9 Depression Screening         General Health  · Sleep: sleeps poorly and gets 4-6 hours of sleep on average  · Hearing: normal - bilateral   · Vision: vision problems: allergies  · Dental: does not floss and dentures  /GYN Health  · Patient is: perimenopausal  · Last menstrual period:yrs  · Contraceptive method: menopause  Review of Systems:     Review of Systems   Constitutional: Negative for chills and fever  HENT: Positive for rhinorrhea, sinus pressure and sneezing  Negative for ear pain and sore throat  Eyes: Positive for discharge and itching  Negative for pain and visual disturbance  Respiratory: Positive for shortness of breath  Negative for cough  Cardiovascular: Negative for chest pain and palpitations  Gastrointestinal: Negative for abdominal pain and vomiting  Genitourinary: Negative for dysuria and hematuria  Musculoskeletal: Positive for back pain and neck pain  Negative for arthralgias  Skin: Negative for color change and rash  Neurological: Negative for seizures and syncope  Psychiatric/Behavioral: Positive for dysphoric mood  All other systems reviewed and are negative       Past Medical History:     Past Medical History:   Diagnosis Date   • Allergies    • Anxiety    • Asthma    • Bereavement    • Bipolar disorder ([facility] [address] )    • Carpal tunnel syndrome, bilateral    • Chronic bronchitis (HCC)    • Chronic constipation    • Chronic low back pain    • Chronic neck pain    • COPD (chronic obstructive pulmonary disease) ([facility])    • Costochondritis    • DDD (degenerative disc disease), cervical    • Degenerative joint disease of left hip    • Depression    • Diverticulitis    • DJD (degenerative joint disease) of knee    • DJD (degenerative joint disease), ankle and foot    • Edema    • Fatigue    • GABE (generalized anxiety disorder)    • GERD (gastroesophageal reflux disease)    • Hernia, inguinal    • Hernia, umbilical    • Hyperlipidemia • Hypertension    • IBS (irritable bowel syndrome)    • Insomnia    • Lower GI bleed    • Migraine    • Neuropathy    • PTSD (post-traumatic stress disorder)    • Raynaud disease    • Scalp psoriasis    • Situational depression    • Tobacco dependence       Past Surgical History:     Past Surgical History:   Procedure Laterality Date   • CHEST TUBE INSERTION     • CRYOTHERAPY      Plantar wart   • DXA PROCEDURE (HISTORICAL)  09/04/2009 & 8/24/2005     [facility]   • HERNIA REPAIR      Umbilical   • MAMMO (HISTORICAL)  02/14/2014    [facility]      Social History:     Social History     Socioeconomic History   • Marital status: Single     Spouse name: None   • Number of children: None   • Years of education: None   • Highest education level: None   Occupational History   • None   Tobacco Use   • Smoking status: Every Day     Packs/day: 2 00     Types: Cigarettes     Start date: 1982     Passive exposure: Current   • Smokeless tobacco: Never   Vaping Use   • Vaping Use: Never used   Substance and Sexual Activity   • Alcohol use: Yes     Comment: Occasionally   • Drug use: Not Currently   • Sexual activity: None   Other Topics Concern   • None   Social History Narrative    Alcohol: No    Per paper chart     Social Determinants of Health     Financial Resource Strain: Not on file   Food Insecurity: Not on file   Transportation Needs: Not on file   Physical Activity: Not on file   Stress: Not on file   Social Connections: Not on file   Intimate Partner Violence: Not on file   Housing Stability: Not on file      Family History:     Family History   Problem Relation Age of Onset   • Stomach cancer Father    • Pulmonary fibrosis Maternal Aunt    • Rheum arthritis Maternal Aunt    • Heart disease Other    • Diabetes Other    • Arthritis Other    • Anxiety disorder Other    • Depression Other    • Cancer Other    • No Known Problems Maternal Uncle    • No Known Problems Paternal Uncle    • No Known Problems Paternal Uncle Current Medications:     Current Outpatient Medications   Medication Sig Dispense Refill   • albuterol (2 5 mg/3 mL) 0 083 % nebulizer solution Take 3 mL (2 5 mg total) by nebulization every 6 (six) hours as needed for wheezing or shortness of breath 180 mL 5   • HYDROcodone-acetaminophen (NORCO) 7 5-325 mg per tablet Take 1 tablet by mouth every 6 (six) hours as needed for pain Max Daily Amount: 3 tablets 90 tablet 0   • loratadine (CLARITIN) 10 mg tablet Take 1 tablet (10 mg total) by mouth daily 90 tablet 1   • mirtazapine (REMERON) 45 MG tablet Take 1 tablet (45 mg total) by mouth daily at bedtime 30 tablet 2   • montelukast (SINGULAIR) 10 mg tablet Take 1 tablet (10 mg total) by mouth daily at bedtime 90 tablet 3   • olopatadine (PATANOL) 0 1 % ophthalmic solution Administer 1 drop to both eyes 2 (two) times a day 5 mL 3   • atorvastatin (LIPITOR) 10 mg tablet Take 1 tablet (10 mg total) by mouth daily 90 tablet 1   • benzonatate (TESSALON) 200 MG capsule Take 1 capsule (200 mg total) by mouth 3 (three) times a day as needed for cough 30 capsule 2   • Calcium Carbonate 1500 (600 Ca) MG TABS Take 1 tablet by mouth 2 (two) times a day with meals 180 tablet 1   • diclofenac (VOLTAREN) 75 mg EC tablet Take 1 tablet (75 mg total) by mouth 2 (two) times a day 60 tablet 5   • fluticasone-umeclidinium-vilanterol (Trelegy Ellipta) 200-62 5-25 mcg/actuation AEPB inhaler Inhale 1 puff daily Rinse mouth after use   60 blister 5   • hydrOXYzine pamoate (VISTARIL) 50 mg capsule Take 1 capsule (50 mg total) by mouth 3 (three) times a day as needed for itching 30 capsule 5   • methocarbamol (ROBAXIN) 750 mg tablet Take 1 tablet (750 mg total) by mouth 4 (four) times a day 120 tablet 3   • methylPREDNISolone 4 MG tablet therapy pack Use as directed on package (Patient not taking: Reported on 5/22/2023) 21 each 0   • Multiple Vitamins-Minerals (MULTIVITAMIN ADULT PO) Take by mouth     • nicotine (NICODERM CQ) 21 mg/24 hr TD "24 hr patch Place 1 patch on the skin every 24 hours (Patient taking differently: Place 1 patch on the skin every 24 hours Uses off and on ) 28 patch 11   • Promethazine-DM (PHENERGAN-DM) 6 25-15 mg/5 mL oral syrup Take 5 mL by mouth 4 (four) times a day as needed for cough (Patient not taking: Reported on 5/22/2023) 180 mL 1   • pyridoxine (VITAMIN B6) 50 mg tablet take 1 tablet by mouth three times a day 90 tablet 1   • QUEtiapine (SEROquel) 100 mg tablet Take 1 tablet (100 mg total) by mouth daily at bedtime 90 tablet 1   • topiramate (TOPAMAX) 50 MG tablet Take 50 mg by mouth 2 (two) times a day (Patient not taking: Reported on 3/11/2022 )     • triamcinolone (KENALOG) 0 1 % cream Apply topically 2 (two) times a day 30 g 0   • TURMERIC PO Take 1 tablet by mouth       No current facility-administered medications for this visit  Allergies: Allergies   Allergen Reactions   • Erythromycin GI Intolerance      Physical Exam:     /64   Pulse 84   Temp 97 7 °F (36 5 °C)   Resp 20   Ht 5' 2\" (1 575 m)   Wt 60 8 kg (134 lb)   BMI 24 51 kg/m²     Physical Exam  Vitals and nursing note reviewed  Constitutional:       General: She is not in acute distress  Appearance: She is well-developed  HENT:      Head: Normocephalic and atraumatic  Right Ear: Tympanic membrane, ear canal and external ear normal       Left Ear: Tympanic membrane, ear canal and external ear normal       Nose: Congestion and rhinorrhea present  Mouth/Throat:      Mouth: Mucous membranes are moist       Dentition: Has dentures  Pharynx: Oropharynx is clear  Eyes:      Extraocular Movements: Extraocular movements intact  Conjunctiva/sclera:      Right eye: Right conjunctiva is injected  Left eye: Left conjunctiva is injected  Pupils: Pupils are equal, round, and reactive to light  Cardiovascular:      Rate and Rhythm: Normal rate and regular rhythm  Pulses: Normal pulses        Heart sounds: " Normal heart sounds  No murmur heard  Pulmonary:      Effort: Pulmonary effort is normal  No respiratory distress  Breath sounds: Rhonchi present  Abdominal:      General: Abdomen is flat  Bowel sounds are normal       Palpations: Abdomen is soft  Tenderness: There is no abdominal tenderness  Musculoskeletal:         General: Tenderness present  No swelling  Cervical back: Normal range of motion and neck supple  Comments: Decreased range of motion of the cervical spine with multiple trigger points lumbar paraspinal spasm with bilateral SI tenderness   Skin:     General: Skin is warm and dry  Capillary Refill: Capillary refill takes less than 2 seconds  Neurological:      General: No focal deficit present  Mental Status: She is alert and oriented to person, place, and time  Psychiatric:         Mood and Affect: Mood normal          Behavior: Behavior normal          Thought Content:  Thought content normal          Judgment: Judgment normal           Boyd Barron DO  [facility] PRIMARY CARE

## 2023-06-24 NOTE — PROGRESS NOTES
Assessment/Plan:  Acute bronchitis will treat with a Medrol Dosepak and doxycycline 100 mg b i d  And promethazine DM    Moderate persistent asthma the patient is using rescue inhaler daily    Chronic obstructive pulmonary disease with Trelegy b i d  As maintenance inhaler    Chronic neck and low back pain  The patient is treated with Knute Decent improves function  The PDMP has been reviewed no evidence of abuse or divergence  Norco was used in conjunction with Voltaren 75 b i d  Tobacco use disorder patient is not interested in quitting at this time    Problem List Items Addressed This Visit     None      Visit Diagnoses     Chronic obstructive pulmonary disease, unspecified COPD type ([address] )        Chronic neck and back pain               Diagnoses and all orders for this visit:    Chronic obstructive pulmonary disease, unspecified COPD type ([address] )    Chronic neck and back pain        No problem-specific Assessment & Plan notes found for this encounter  PHQ-2/9 Depression Screening            Body mass index is 24 33 kg/m²  BMI Counseling: Body mass index is 24 33 kg/m²  The BMI   Subjective:      Patient ID: Juliana Amaro is a 49 y o  female  Patient presents with a chief complaint of coughing up of yellowish phlegm with increasing shortness of breath and wheezing    Also presents for chronic neck and back pain      The following portions of the patient's history were reviewed and updated as appropriate:   She has a past medical history of Allergies, Anxiety, Asthma, Bereavement, Bipolar disorder ([address] ), Carpal tunnel syndrome, bilateral, Chronic bronchitis ([address] ), Chronic constipation, Chronic low back pain, Chronic neck pain, COPD (chronic obstructive pulmonary disease) ([address] ), Costochondritis, DDD (degenerative disc disease), cervical, Degenerative joint disease of left hip, Depression, Diverticulitis, DJD (degenerative joint disease) of knee, DJD (degenerative joint disease), ankle and foot, Edema, Fatigue, GABE (generalized anxiety disorder), GERD (gastroesophageal reflux disease), Hernia, inguinal, Hernia, umbilical, Hyperlipidemia, Hypertension, IBS (irritable bowel syndrome), Insomnia, Lower GI bleed, Migraine, Neuropathy, PTSD (post-traumatic stress disorder), Raynaud disease, Scalp psoriasis, Situational depression, and Tobacco dependence  ,  does not have any pertinent problems on file  ,   has a past surgical history that includes Chest tube insertion; DXA procedure(historical) (09/04/2009 & 8/24/2005); Mammo (historical) (02/14/2014); Hernia repair; and Cryotherapy  ,  family history includes Anxiety disorder in her other; Arthritis in her other; Cancer in her other; Depression in her other; Diabetes in her other; Heart disease in her other  ,   reports that she has been smoking  She has never used smokeless tobacco  She reports current alcohol use  She reports previous drug use ,  is allergic to erythromycin     Current Outpatient Medications   Medication Sig Dispense Refill    atorvastatin (LIPITOR) 10 mg tablet Take 1 tablet (10 mg total) by mouth daily 90 tablet 1    benzonatate (TESSALON) 200 MG capsule Take 1 capsule (200 mg total) by mouth 3 (three) times a day as needed for cough 30 capsule 2    calcium carbonate (OS-ONELIA) 600 MG tablet Take 1 tablet (600 mg total) by mouth 2 (two) times a day with meals 180 tablet 1    diclofenac (VOLTAREN) 75 mg EC tablet Take 1 tablet (75 mg total) by mouth 2 (two) times a day 60 tablet 5    fluticasone-umeclidinium-vilanterol (Trelegy Ellipta) 100-62 5-25 MCG/INH inhaler Inhale 1 puff daily Rinse mouth after use  1 each 0    fluticasone-umeclidinium-vilanterol (Trelegy Ellipta) 200-62 5-25 MCG/INH AEPB inhaler Inhale 1 puff daily Rinse mouth after use   1 each 5    HYDROcodone-acetaminophen (NORCO) 7 5-325 mg per tablet Take 1 tablet by mouth every 6 (six) hours as needed for pain Max Daily Amount: 3 tablets 90 tablet 0    hydrOXYzine pamoate (VISTARIL) 50 mg capsule Take 1 capsule (50 mg total) by mouth 3 (three) times a day as needed for itching 30 capsule 5    loratadine (CLARITIN) 10 mg tablet Take 1 tablet (10 mg total) by mouth daily 90 tablet 1    methocarbamol (ROBAXIN) 750 mg tablet Take 1 tablet (750 mg total) by mouth 4 (four) times a day 120 tablet 3    methylPREDNISolone 4 MG tablet therapy pack Use as directed on package 21 each 0    mirtazapine (REMERON) 45 MG tablet Take 1 tablet (45 mg total) by mouth daily at bedtime 30 tablet 2    Multiple Vitamins-Minerals (MULTIVITAMIN ADULT PO) Take by mouth      nicotine (NICODERM CQ) 21 mg/24 hr TD 24 hr patch Place 1 patch on the skin every 24 hours (Patient taking differently: Place 1 patch on the skin every 24 hours Uses off and on ) 28 patch 11    pyridoxine (VITAMIN B6) 50 mg tablet Take 1 tablet (50 mg total) by mouth 3 (three) times a day 90 tablet 5    QUEtiapine (SEROquel) 100 mg tablet Take 1 tablet (100 mg total) by mouth daily at bedtime 90 tablet 1    topiramate (TOPAMAX) 50 MG tablet Take 50 mg by mouth 2 (two) times a day (Patient not taking: Reported on 3/11/2022 )      TURMERIC PO Take 1 tablet by mouth       No current facility-administered medications for this visit  Review of Systems   Constitutional: Negative for chills and fever  HENT: Negative for ear pain and sore throat  Eyes: Negative for pain and visual disturbance  Respiratory: Positive for cough, shortness of breath and wheezing  Cardiovascular: Negative for chest pain and palpitations  Gastrointestinal: Negative for abdominal pain and vomiting  Genitourinary: Negative for dysuria and hematuria  Musculoskeletal: Positive for back pain and neck pain  Negative for arthralgias  Skin: Negative for color change and rash  Neurological: Negative for seizures and syncope  All other systems reviewed and are negative          Objective:    /74   Pulse 84   Temp 98 °F (36 7 °C)   Resp 20   Ht 5' 2" (1 575 m)   Wt 60 3 kg (133 lb)   BMI 24 33 kg/m²   Body mass index is 24 33 kg/m²  Physical Exam  Constitutional:       Appearance: She is well-developed  HENT:      Head: Normocephalic  Eyes:      Pupils: Pupils are equal, round, and reactive to light  Cardiovascular:      Rate and Rhythm: Normal rate and regular rhythm  Heart sounds: Normal heart sounds  Pulmonary:      Effort: Pulmonary effort is normal       Breath sounds: Wheezing and rhonchi present  Abdominal:      General: Bowel sounds are normal       Palpations: Abdomen is soft  Tenderness: There is no abdominal tenderness  Musculoskeletal:      Cervical back: Normal range of motion  Comments: Decreased range of motion of the cervical spine with paraspinal tenderness lumbar paraspinal spasm with bilateral SI tenderness   Skin:     General: Skin is warm  Neurological:      Mental Status: She is alert and oriented to person, place, and time  acute bowel obstruction or ileus. Multiple scattered diverticula are seen in sigmoid, descending and distal transverse colon without evidence of acute diverticulitis. Appendix is normal. No free fluid or free intraperitoneal air is noted. No evidence of grossly enlarged lymphadenopathy. Atheromatous sclerotic changes seen in the abdominal aorta. . Minimal grade I retrolisthesis of L5 relative to S1 is noted. IMPRESSION: Cystic density area in right renal sinus with thin wall enhancement but surrounding fat stranding and adjacent trace of perinephric free fluid. The differentials include renal sinus/dino pelvic cyst with probable infection/inflammation or complex cyst such as abscess would need consideration in appropriate clinical context. Less likley to be a solid non enhancing mass but cant be excluded. Ultrasound correlation would be helpful. Scattered colonic diverticulosis without evidence of acute diverticulitis. Patient is status postcholecystectomy. Moderate sized duodenal diverticulum. _________________________________________ Electronically Signed by: Mae Kaur MD. (06/23/2023 23:18:48 CST) _____________________________________     No results found. PROCEDURES   Unless otherwise noted below, none     Procedures     CRITICAL CARE TIME   Unless otherwise noted below, none    ***      EMERGENCYDEPARTMENT COURSE/MDM:     Vitals:    Vitals:    06/24/23 1601   BP: 119/78   Pulse: 71   Resp: 20   Temp: 98 °F (36.7 °C)   SpO2: 96%         Patient was seen and evaluated by myself. Patient here for concerns for anxiety abdominal pain. Patient reports that he was recently seen at [facility] for abdominal pain and anxiety. He reports that he was offered admission but Declined. Patient presents to the ED today for increased anxiety. Patient denied history of anxiety has not taken medications. Patient reports that he had images and labs done at [facility].   On exam today he is awake alert he was

## 2023-07-10 ENCOUNTER — TELEPHONE (OUTPATIENT)
Dept: FAMILY MEDICINE CLINIC | Facility: CLINIC | Age: 53
End: 2023-07-10

## 2023-07-10 DIAGNOSIS — F33.41 RECURRENT MAJOR DEPRESSIVE DISORDER, IN PARTIAL REMISSION (HCC): ICD-10-CM

## 2023-07-10 RX ORDER — QUETIAPINE FUMARATE 100 MG/1
100 TABLET, FILM COATED ORAL
Qty: 90 TABLET | Refills: 1 | Status: SHIPPED | OUTPATIENT
Start: 2023-07-10

## 2023-07-15 ENCOUNTER — APPOINTMENT (OUTPATIENT)
Dept: LAB | Facility: HOSPITAL | Age: 53
End: 2023-07-15
Payer: COMMERCIAL

## 2023-07-18 ENCOUNTER — OFFICE VISIT (OUTPATIENT)
Dept: FAMILY MEDICINE CLINIC | Facility: CLINIC | Age: 53
End: 2023-07-18
Payer: COMMERCIAL

## 2023-07-18 VITALS
HEART RATE: 76 BPM | RESPIRATION RATE: 16 BRPM | HEIGHT: 62 IN | TEMPERATURE: 98.1 F | DIASTOLIC BLOOD PRESSURE: 74 MMHG | BODY MASS INDEX: 25.03 KG/M2 | WEIGHT: 136 LBS | SYSTOLIC BLOOD PRESSURE: 136 MMHG

## 2023-07-18 DIAGNOSIS — M54.9 CHRONIC NECK AND BACK PAIN: ICD-10-CM

## 2023-07-18 DIAGNOSIS — F33.41 RECURRENT MAJOR DEPRESSIVE DISORDER, IN PARTIAL REMISSION (HCC): ICD-10-CM

## 2023-07-18 DIAGNOSIS — G89.29 CHRONIC NECK AND BACK PAIN: ICD-10-CM

## 2023-07-18 DIAGNOSIS — M54.50 CHRONIC BILATERAL LOW BACK PAIN WITHOUT SCIATICA: ICD-10-CM

## 2023-07-18 DIAGNOSIS — G89.29 CHRONIC BILATERAL LOW BACK PAIN WITHOUT SCIATICA: ICD-10-CM

## 2023-07-18 DIAGNOSIS — F41.9 ANXIETY: Primary | ICD-10-CM

## 2023-07-18 DIAGNOSIS — J44.9 CHRONIC OBSTRUCTIVE PULMONARY DISEASE, UNSPECIFIED COPD TYPE (HCC): ICD-10-CM

## 2023-07-18 DIAGNOSIS — F17.200 TOBACCO USE DISORDER: ICD-10-CM

## 2023-07-18 DIAGNOSIS — M54.2 CHRONIC NECK AND BACK PAIN: ICD-10-CM

## 2023-07-18 PROCEDURE — 99213 OFFICE O/P EST LOW 20 MIN: CPT | Performed by: FAMILY MEDICINE

## 2023-07-18 RX ORDER — HYDROXYZINE PAMOATE 50 MG/1
50 CAPSULE ORAL 3 TIMES DAILY PRN
Qty: 30 CAPSULE | Refills: 5 | Status: SHIPPED | OUTPATIENT
Start: 2023-07-18

## 2023-07-18 RX ORDER — DICLOFENAC SODIUM 75 MG/1
75 TABLET, DELAYED RELEASE ORAL 2 TIMES DAILY
Qty: 60 TABLET | Refills: 5 | Status: SHIPPED | OUTPATIENT
Start: 2023-07-18

## 2023-07-18 RX ORDER — HYDROCODONE BITARTRATE AND ACETAMINOPHEN 7.5; 325 MG/1; MG/1
1 TABLET ORAL EVERY 6 HOURS PRN
Qty: 90 TABLET | Refills: 0 | Status: SHIPPED | OUTPATIENT
Start: 2023-07-18

## 2023-07-18 NOTE — PROGRESS NOTES
Assessment/Plan: Anxiety is treated with Vistaril 50 mg 3 times daily as needed    Recurrent major depression is treated with Seroquel 100 mg and Remeron 45 mg    Chronic neck and low back pain treated with hydrocodone PDMP reviewed no issues found no evidence of divergence or abuse    COPD with continued tobacco use disorder patient has cut down from 2 packs/day to 1 pack/day has not been on nicotine replacement failure is not a candidate for Chantix or Wellbutrin    Problem List Items Addressed This Visit        Other    Chronic bilateral low back pain without sciatica    Relevant Medications    diclofenac (VOLTAREN) 75 mg EC tablet    Tobacco use disorder   Other Visit Diagnoses     Anxiety    -  Primary    Relevant Medications    hydrOXYzine pamoate (VISTARIL) 50 mg capsule    Recurrent major depressive disorder, in partial remission (HCC)        Relevant Medications    hydrOXYzine pamoate (VISTARIL) 50 mg capsule    Chronic neck and back pain        Relevant Medications    HYDROcodone-acetaminophen (NORCO) 7.5-325 mg per tablet    Chronic obstructive pulmonary disease, unspecified COPD type ([address])               Diagnoses and all orders for this visit:    Anxiety  -     hydrOXYzine pamoate (VISTARIL) 50 mg capsule; Take 1 capsule (50 mg total) by mouth 3 (three) times a day as needed for itching    Recurrent major depressive disorder, in partial remission (HCC)    Chronic neck and back pain  -     HYDROcodone-acetaminophen (NORCO) 7.5-325 mg per tablet; Take 1 tablet by mouth every 6 (six) hours as needed for pain Max Daily Amount: 3 tablets    Chronic bilateral low back pain without sciatica  -     diclofenac (VOLTAREN) 75 mg EC tablet; Take 1 tablet (75 mg total) by mouth 2 (two) times a day    Chronic obstructive pulmonary disease, unspecified COPD type ([address])    Tobacco use disorder        No problem-specific Assessment & Plan notes found for this encounter.       PHQ-2/9 Depression Screening            Body mass index is 24.87 kg/m². BMI Counseling: Body mass index is 24.87 kg/m². The BMI     Subjective:      Patient ID: Elizabeth Dennison is a 47 y.o. female. Patient presents for checkup on chronic neck and back pain. Patient also complaining of left knee pain and has chronic anxiety and COPD      The following portions of the patient's history were reviewed and updated as appropriate:   She has a past medical history of Allergies, Anxiety, Asthma, Bereavement, Bipolar disorder ([address]), Carpal tunnel syndrome, bilateral, Chronic bronchitis ([address]), Chronic constipation, Chronic low back pain, Chronic neck pain, COPD (chronic obstructive pulmonary disease) ([address]), Costochondritis, DDD (degenerative disc disease), cervical, Degenerative joint disease of left hip, Depression, Diverticulitis, DJD (degenerative joint disease) of knee, DJD (degenerative joint disease), ankle and foot, Edema, Fatigue, GABE (generalized anxiety disorder), GERD (gastroesophageal reflux disease), Hernia, inguinal, Hernia, umbilical, Hyperlipidemia, Hypertension, IBS (irritable bowel syndrome), Insomnia, Lower GI bleed, Migraine, Neuropathy, PTSD (post-traumatic stress disorder), Raynaud disease, Scalp psoriasis, Situational depression, and Tobacco dependence. ,  does not have any pertinent problems on file. ,   has a past surgical history that includes Chest tube insertion; DXA procedure(historical) (09/04/2009 & 8/24/2005); Mammo (historical) (02/14/2014); Hernia repair; and Cryotherapy. ,  family history includes Anxiety disorder in her other; Arthritis in her other; Cancer in her other; Depression in her other; Diabetes in her other; Heart disease in her other; No Known Problems in her maternal uncle, paternal uncle, and paternal uncle; Pulmonary fibrosis in her maternal aunt; Rheum arthritis in her maternal aunt; Stomach cancer in her father. ,   reports that she has been smoking cigarettes. She started smoking about 41 years ago.  She has been smoking an average of 2 packs per day. She has been exposed to tobacco smoke. She has never used smokeless tobacco. She reports current alcohol use. She reports that she does not currently use drugs. ,  is allergic to erythromycin. .  Current Outpatient Medications   Medication Sig Dispense Refill   • diclofenac (VOLTAREN) 75 mg EC tablet Take 1 tablet (75 mg total) by mouth 2 (two) times a day 60 tablet 5   • HYDROcodone-acetaminophen (NORCO) 7.5-325 mg per tablet Take 1 tablet by mouth every 6 (six) hours as needed for pain Max Daily Amount: 3 tablets 90 tablet 0   • hydrOXYzine pamoate (VISTARIL) 50 mg capsule Take 1 capsule (50 mg total) by mouth 3 (three) times a day as needed for itching 30 capsule 5   • albuterol (2.5 mg/3 mL) 0.083 % nebulizer solution Take 3 mL (2.5 mg total) by nebulization every 6 (six) hours as needed for wheezing or shortness of breath 180 mL 5   • atorvastatin (LIPITOR) 10 mg tablet Take 1 tablet (10 mg total) by mouth daily 90 tablet 1   • benzonatate (TESSALON) 200 MG capsule Take 1 capsule (200 mg total) by mouth 3 (three) times a day as needed for cough 30 capsule 2   • Calcium Carbonate 1500 (600 Ca) MG TABS Take 1 tablet by mouth 2 (two) times a day with meals 180 tablet 1   • fluticasone-umeclidinium-vilanterol (Trelegy Ellipta) 200-62.5-25 mcg/actuation AEPB inhaler Inhale 1 puff daily Rinse mouth after use.  60 blister 5   • loratadine (CLARITIN) 10 mg tablet Take 1 tablet (10 mg total) by mouth daily 90 tablet 1   • methocarbamol (ROBAXIN) 750 mg tablet Take 1 tablet (750 mg total) by mouth 4 (four) times a day 120 tablet 3   • methylPREDNISolone 4 MG tablet therapy pack Use as directed on package (Patient not taking: Reported on 5/22/2023) 21 each 0   • mirtazapine (REMERON) 45 MG tablet Take 1 tablet (45 mg total) by mouth daily at bedtime 30 tablet 2   • montelukast (SINGULAIR) 10 mg tablet Take 1 tablet (10 mg total) by mouth daily at bedtime 90 tablet 3   • Multiple Vitamins-Minerals (MULTIVITAMIN ADULT PO) Take by mouth     • nicotine (NICODERM CQ) 21 mg/24 hr TD 24 hr patch Place 1 patch on the skin every 24 hours (Patient taking differently: Place 1 patch on the skin every 24 hours Uses off and on ) 28 patch 11   • olopatadine (PATANOL) 0.1 % ophthalmic solution Administer 1 drop to both eyes 2 (two) times a day 5 mL 3   • Promethazine-DM (PHENERGAN-DM) 6.25-15 mg/5 mL oral syrup Take 5 mL by mouth 4 (four) times a day as needed for cough (Patient not taking: Reported on 5/22/2023) 180 mL 1   • pyridoxine (VITAMIN B6) 50 mg tablet take 1 tablet by mouth three times a day 90 tablet 1   • QUEtiapine (SEROquel) 100 mg tablet Take 1 tablet (100 mg total) by mouth daily at bedtime 90 tablet 1   • topiramate (TOPAMAX) 50 MG tablet Take 50 mg by mouth 2 (two) times a day (Patient not taking: Reported on 3/11/2022 )     • triamcinolone (KENALOG) 0.1 % cream Apply topically 2 (two) times a day 30 g 0   • TURMERIC PO Take 1 tablet by mouth       No current facility-administered medications for this visit. Review of Systems   Constitutional: Negative for chills and fever. HENT: Negative for ear pain and sore throat. Eyes: Negative for pain and visual disturbance. Respiratory: Positive for shortness of breath and wheezing. Negative for cough. Cardiovascular: Negative for chest pain and palpitations. Gastrointestinal: Negative for abdominal pain and vomiting. Genitourinary: Negative for dysuria and hematuria. Musculoskeletal: Negative for arthralgias and back pain. Neck back and left knee pain   Skin: Negative for color change and rash. Neurological: Negative for seizures and syncope. All other systems reviewed and are negative. Objective:    /74   Pulse 76   Temp 98.1 °F (36.7 °C)   Resp 16   Ht 5' 2" (1.575 m)   Wt 61.7 kg (136 lb)   BMI 24.87 kg/m²   Body mass index is 24.87 kg/m².      Physical Exam  Constitutional:       Appearance: Normal appearance. She is well-developed. HENT:      Head: Normocephalic and atraumatic. Right Ear: Tympanic membrane, ear canal and external ear normal.      Left Ear: Tympanic membrane, ear canal and external ear normal.      Nose: Nose normal.      Mouth/Throat:      Mouth: Mucous membranes are moist.      Pharynx: Oropharynx is clear. Eyes:      Extraocular Movements: Extraocular movements intact. Conjunctiva/sclera: Conjunctivae normal.      Pupils: Pupils are equal, round, and reactive to light. Cardiovascular:      Rate and Rhythm: Normal rate and regular rhythm. Pulses: Normal pulses. Heart sounds: Normal heart sounds. Pulmonary:      Effort: Pulmonary effort is normal.      Breath sounds: Normal breath sounds. Abdominal:      General: Abdomen is flat. Bowel sounds are normal.      Palpations: Abdomen is soft. Tenderness: There is no abdominal tenderness. Musculoskeletal:         General: Normal range of motion. Cervical back: Normal range of motion and neck supple. Comments: Decreased range of motion of the cervical spine with multiple trigger points lumbar paraspinal spasm with SI tenderness bilaterally small Baker's cyst of the left knee   Skin:     General: Skin is warm and dry. Capillary Refill: Capillary refill takes less than 2 seconds. Neurological:      General: No focal deficit present. Mental Status: She is alert and oriented to person, place, and time. Psychiatric:         Mood and Affect: Mood normal.         Behavior: Behavior normal.         Thought Content:  Thought content normal.         Judgment: Judgment normal.

## 2023-07-21 LAB
HYDROCODONE UR QL: 825 NG/ML
HYDROMORPHONE UR QL: 277 NG/ML

## 2023-08-18 ENCOUNTER — OFFICE VISIT (OUTPATIENT)
Dept: FAMILY MEDICINE CLINIC | Facility: CLINIC | Age: 53
End: 2023-08-18
Payer: COMMERCIAL

## 2023-08-18 VITALS
SYSTOLIC BLOOD PRESSURE: 128 MMHG | BODY MASS INDEX: 24.4 KG/M2 | OXYGEN SATURATION: 95 % | DIASTOLIC BLOOD PRESSURE: 78 MMHG | HEART RATE: 109 BPM | WEIGHT: 133.4 LBS

## 2023-08-18 DIAGNOSIS — M54.2 CHRONIC NECK AND BACK PAIN: Primary | ICD-10-CM

## 2023-08-18 DIAGNOSIS — J44.9 CHRONIC OBSTRUCTIVE PULMONARY DISEASE, UNSPECIFIED COPD TYPE (HCC): ICD-10-CM

## 2023-08-18 DIAGNOSIS — G89.29 CHRONIC NECK AND BACK PAIN: Primary | ICD-10-CM

## 2023-08-18 DIAGNOSIS — G62.9 NEUROPATHY: ICD-10-CM

## 2023-08-18 DIAGNOSIS — J45.40 MODERATE PERSISTENT ASTHMA, UNSPECIFIED WHETHER COMPLICATED: ICD-10-CM

## 2023-08-18 DIAGNOSIS — Z88.9 HISTORY OF SEASONAL ALLERGIES: ICD-10-CM

## 2023-08-18 DIAGNOSIS — F32.A ANXIETY AND DEPRESSION: ICD-10-CM

## 2023-08-18 DIAGNOSIS — F41.9 ANXIETY AND DEPRESSION: ICD-10-CM

## 2023-08-18 DIAGNOSIS — M54.9 CHRONIC NECK AND BACK PAIN: Primary | ICD-10-CM

## 2023-08-18 PROCEDURE — 99214 OFFICE O/P EST MOD 30 MIN: CPT | Performed by: FAMILY MEDICINE

## 2023-08-18 RX ORDER — BENZONATATE 200 MG/1
200 CAPSULE ORAL 3 TIMES DAILY PRN
Qty: 30 CAPSULE | Refills: 2 | Status: SHIPPED | OUTPATIENT
Start: 2023-08-18

## 2023-08-18 RX ORDER — HYDROCODONE BITARTRATE AND ACETAMINOPHEN 7.5; 325 MG/1; MG/1
1 TABLET ORAL EVERY 6 HOURS PRN
Qty: 90 TABLET | Refills: 0 | Status: SHIPPED | OUTPATIENT
Start: 2023-08-18

## 2023-08-18 RX ORDER — BENZONATATE 200 MG/1
200 CAPSULE ORAL 3 TIMES DAILY PRN
Qty: 30 CAPSULE | Refills: 2 | OUTPATIENT
Start: 2023-08-18

## 2023-08-18 RX ORDER — METHYLPREDNISOLONE 4 MG/1
TABLET ORAL
Qty: 21 EACH | Refills: 0 | Status: SHIPPED | OUTPATIENT
Start: 2023-08-18

## 2023-08-18 RX ORDER — LORATADINE 10 MG/1
10 TABLET ORAL DAILY
Qty: 90 TABLET | Refills: 1 | Status: SHIPPED | OUTPATIENT
Start: 2023-08-18

## 2023-08-18 RX ORDER — LANOLIN ALCOHOL/MO/W.PET/CERES
50 CREAM (GRAM) TOPICAL DAILY
Qty: 90 TABLET | Refills: 1 | Status: SHIPPED | OUTPATIENT
Start: 2023-08-18

## 2023-08-18 NOTE — PROGRESS NOTES
Assessment/Plan: Acute on chronic left-sided neck pain with worsening of pain the plan will be to supply a Medrol Dosepak if that is ineffective to obtain an updated x-ray of the cervical spine. Chronic neck and low back pain treated with hydrocodone the PDMP has been reviewed no issues found no evidence of diversion's or abuse    COPD with asthmatic component with continued tobacco use disorder. The patient has cut down from 2 packs a day to 1 pack/day the patient has been a nicotine replacement failure and is not a candidate for Chantix or Wellbutrin    Anxiety is treated with Vistaril 50 mg 3 times daily as needed    Recurrent major depression is treated with Seroquel 100 mg of Remeron 45 mg    Seasonal allergies Claritin is effective therapy    Peripheral neuropathy treated with B6 50 mg daily    Problem List Items Addressed This Visit    None  Visit Diagnoses     Chronic neck and back pain    -  Primary    Relevant Medications    HYDROcodone-acetaminophen (NORCO) 7.5-325 mg per tablet    methylPREDNISolone 4 MG tablet therapy pack    Other Relevant Orders    XR spine cervical 2 or 3 vw injury    Chronic obstructive pulmonary disease, unspecified COPD type (HCC)        Relevant Medications    loratadine (CLARITIN) 10 mg tablet    benzonatate (TESSALON) 200 MG capsule    methylPREDNISolone 4 MG tablet therapy pack    Moderate persistent asthma, unspecified whether complicated        Anxiety and depression        History of seasonal allergies        Relevant Medications    loratadine (CLARITIN) 10 mg tablet    Neuropathy        Relevant Medications    pyridoxine (VITAMIN B6) 50 mg tablet           Diagnoses and all orders for this visit:    Chronic neck and back pain  -     HYDROcodone-acetaminophen (NORCO) 7.5-325 mg per tablet; Take 1 tablet by mouth every 6 (six) hours as needed for pain Max Daily Amount: 3 tablets  -     XR spine cervical 2 or 3 vw injury;  Future  -     methylPREDNISolone 4 MG tablet therapy pack; Use as directed on package    Chronic obstructive pulmonary disease, unspecified COPD type ([address])  -     benzonatate (TESSALON) 200 MG capsule; Take 1 capsule (200 mg total) by mouth 3 (three) times a day as needed for cough    Moderate persistent asthma, unspecified whether complicated    Anxiety and depression    History of seasonal allergies  -     loratadine (CLARITIN) 10 mg tablet; Take 1 tablet (10 mg total) by mouth daily    Neuropathy  -     pyridoxine (VITAMIN B6) 50 mg tablet; Take 1 tablet (50 mg total) by mouth daily        No problem-specific Assessment & Plan notes found for this encounter. PHQ-2/9 Depression Screening            Body mass index is 24.4 kg/m². BMI Counseling: Body mass index is 24.4 kg/m². The BMI   Subjective:      Patient ID: Sarah  is a 47 y.o. female. Patient complains of increased left-sided neck pain states the pain radiates into her head. Has chronic back pain and neck pain      The following portions of the patient's history were reviewed and updated as appropriate:   She has a past medical history of Allergies, Anxiety, Asthma, Bereavement, Bipolar disorder ([address]), Carpal tunnel syndrome, bilateral, Chronic bronchitis ([address]), Chronic constipation, Chronic low back pain, Chronic neck pain, COPD (chronic obstructive pulmonary disease) ([address]), Costochondritis, DDD (degenerative disc disease), cervical, Degenerative joint disease of left hip, Depression, Diverticulitis, DJD (degenerative joint disease) of knee, DJD (degenerative joint disease), ankle and foot, Edema, Fatigue, GABE (generalized anxiety disorder), GERD (gastroesophageal reflux disease), Hernia, inguinal, Hernia, umbilical, Hyperlipidemia, Hypertension, IBS (irritable bowel syndrome), Insomnia, Lower GI bleed, Migraine, Neuropathy, PTSD (post-traumatic stress disorder), Raynaud disease, Scalp psoriasis, Situational depression, and Tobacco dependence. ,  does not have any pertinent problems on file. ,   has a past surgical history that includes Chest tube insertion; DXA procedure(historical) (09/04/2009 & 8/24/2005); Mammo (historical) (02/14/2014); Hernia repair; and Cryotherapy. ,  family history includes Anxiety disorder in her other; Arthritis in her other; Cancer in her other; Depression in her other; Diabetes in her other; Heart disease in her other; No Known Problems in her maternal uncle, paternal uncle, and paternal uncle; Pulmonary fibrosis in her maternal aunt; Rheum arthritis in her maternal aunt; Stomach cancer in her father. ,   reports that she has been smoking cigarettes. She started smoking about 41 years ago. She has been smoking an average of 2 packs per day. She has been exposed to tobacco smoke. She has never used smokeless tobacco. She reports current alcohol use. She reports that she does not currently use drugs. ,  is allergic to erythromycin. .  Current Outpatient Medications   Medication Sig Dispense Refill   • benzonatate (TESSALON) 200 MG capsule Take 1 capsule (200 mg total) by mouth 3 (three) times a day as needed for cough 30 capsule 2   • HYDROcodone-acetaminophen (NORCO) 7.5-325 mg per tablet Take 1 tablet by mouth every 6 (six) hours as needed for pain Max Daily Amount: 3 tablets 90 tablet 0   • loratadine (CLARITIN) 10 mg tablet Take 1 tablet (10 mg total) by mouth daily 90 tablet 1   • methylPREDNISolone 4 MG tablet therapy pack Use as directed on package 21 each 0   • pyridoxine (VITAMIN B6) 50 mg tablet Take 1 tablet (50 mg total) by mouth daily 90 tablet 1   • albuterol (2.5 mg/3 mL) 0.083 % nebulizer solution Take 3 mL (2.5 mg total) by nebulization every 6 (six) hours as needed for wheezing or shortness of breath 180 mL 5   • atorvastatin (LIPITOR) 10 mg tablet Take 1 tablet (10 mg total) by mouth daily 90 tablet 1   • Calcium Carbonate 1500 (600 Ca) MG TABS Take 1 tablet by mouth 2 (two) times a day with meals 180 tablet 1   • diclofenac (VOLTAREN) 75 mg EC tablet Take 1 tablet (75 mg total) by mouth 2 (two) times a day 60 tablet 5   • fluticasone-umeclidinium-vilanterol (Trelegy Ellipta) 200-62.5-25 mcg/actuation AEPB inhaler Inhale 1 puff daily Rinse mouth after use. 60 blister 5   • hydrOXYzine pamoate (VISTARIL) 50 mg capsule Take 1 capsule (50 mg total) by mouth 3 (three) times a day as needed for itching 30 capsule 5   • methocarbamol (ROBAXIN) 750 mg tablet Take 1 tablet (750 mg total) by mouth 4 (four) times a day 120 tablet 3   • mirtazapine (REMERON) 45 MG tablet Take 1 tablet (45 mg total) by mouth daily at bedtime 30 tablet 2   • montelukast (SINGULAIR) 10 mg tablet Take 1 tablet (10 mg total) by mouth daily at bedtime 90 tablet 3   • Multiple Vitamins-Minerals (MULTIVITAMIN ADULT PO) Take by mouth     • nicotine (NICODERM CQ) 21 mg/24 hr TD 24 hr patch Place 1 patch on the skin every 24 hours (Patient taking differently: Place 1 patch on the skin every 24 hours Uses off and on ) 28 patch 11   • olopatadine (PATANOL) 0.1 % ophthalmic solution Administer 1 drop to both eyes 2 (two) times a day 5 mL 3   • Promethazine-DM (PHENERGAN-DM) 6.25-15 mg/5 mL oral syrup Take 5 mL by mouth 4 (four) times a day as needed for cough (Patient not taking: Reported on 5/22/2023) 180 mL 1   • QUEtiapine (SEROquel) 100 mg tablet Take 1 tablet (100 mg total) by mouth daily at bedtime 90 tablet 1   • topiramate (TOPAMAX) 50 MG tablet Take 50 mg by mouth 2 (two) times a day (Patient not taking: Reported on 3/11/2022 )     • triamcinolone (KENALOG) 0.1 % cream Apply topically 2 (two) times a day 30 g 0   • TURMERIC PO Take 1 tablet by mouth       No current facility-administered medications for this visit. Review of Systems   Constitutional: Negative for chills and fever. HENT: Negative for ear pain and sore throat. Eyes: Negative for pain and visual disturbance. Respiratory: Negative for cough and shortness of breath.     Cardiovascular: Negative for chest pain and palpitations. Gastrointestinal: Negative for abdominal pain and vomiting. Genitourinary: Negative for dysuria and hematuria. Musculoskeletal: Negative for arthralgias and back pain. Worsening left-sided neck pain chronic neck pain and low back pain   Skin: Negative for color change and rash. Neurological: Negative for seizures and syncope. All other systems reviewed and are negative. Objective:    /78 (BP Location: Left arm, Patient Position: Sitting, Cuff Size: Standard)   Pulse (!) 109   Wt 60.5 kg (133 lb 6.4 oz)   SpO2 95%   BMI 24.40 kg/m²   Body mass index is 24.4 kg/m².      Physical Exam Neurological:      General: No focal deficit present. Mental Status: She is alert and oriented to person, place, and time. Psychiatric:         Mood and Affect: Mood normal.         Behavior: Behavior normal.         Thought Content:  Thought content normal.         Judgment: Judgment normal.

## 2023-08-28 DIAGNOSIS — G62.9 NEUROPATHY: ICD-10-CM

## 2023-08-28 RX ORDER — LANOLIN ALCOHOL/MO/W.PET/CERES
50 CREAM (GRAM) TOPICAL DAILY
Qty: 90 TABLET | Refills: 1 | OUTPATIENT
Start: 2023-08-28

## 2023-09-15 DIAGNOSIS — M54.50 CHRONIC BILATERAL LOW BACK PAIN WITHOUT SCIATICA: ICD-10-CM

## 2023-09-15 DIAGNOSIS — G89.29 CHRONIC BILATERAL LOW BACK PAIN WITHOUT SCIATICA: ICD-10-CM

## 2023-09-22 ENCOUNTER — OFFICE VISIT (OUTPATIENT)
Dept: FAMILY MEDICINE CLINIC | Facility: CLINIC | Age: 53
End: 2023-09-22
Payer: COMMERCIAL

## 2023-09-22 VITALS
RESPIRATION RATE: 20 BRPM | HEART RATE: 84 BPM | WEIGHT: 132 LBS | SYSTOLIC BLOOD PRESSURE: 134 MMHG | TEMPERATURE: 97.7 F | DIASTOLIC BLOOD PRESSURE: 80 MMHG | BODY MASS INDEX: 24.29 KG/M2 | HEIGHT: 62 IN

## 2023-09-22 DIAGNOSIS — J40 BRONCHITIS: Primary | ICD-10-CM

## 2023-09-22 DIAGNOSIS — G89.29 CHRONIC NECK AND BACK PAIN: ICD-10-CM

## 2023-09-22 DIAGNOSIS — J44.9 CHRONIC OBSTRUCTIVE PULMONARY DISEASE, UNSPECIFIED COPD TYPE (HCC): ICD-10-CM

## 2023-09-22 DIAGNOSIS — F17.200 TOBACCO USE DISORDER: ICD-10-CM

## 2023-09-22 DIAGNOSIS — G62.9 NEUROPATHY: ICD-10-CM

## 2023-09-22 DIAGNOSIS — M54.9 CHRONIC NECK AND BACK PAIN: ICD-10-CM

## 2023-09-22 DIAGNOSIS — Z12.31 ENCOUNTER FOR SCREENING MAMMOGRAM FOR MALIGNANT NEOPLASM OF BREAST: ICD-10-CM

## 2023-09-22 DIAGNOSIS — M54.2 CHRONIC NECK AND BACK PAIN: ICD-10-CM

## 2023-09-22 DIAGNOSIS — F33.41 RECURRENT MAJOR DEPRESSIVE DISORDER, IN PARTIAL REMISSION (HCC): ICD-10-CM

## 2023-09-22 PROCEDURE — 99214 OFFICE O/P EST MOD 30 MIN: CPT | Performed by: FAMILY MEDICINE

## 2023-09-22 RX ORDER — LANOLIN ALCOHOL/MO/W.PET/CERES
50 CREAM (GRAM) TOPICAL DAILY
Qty: 90 TABLET | Refills: 1 | Status: SHIPPED | OUTPATIENT
Start: 2023-09-22

## 2023-09-22 RX ORDER — DOXYCYCLINE HYCLATE 100 MG
100 TABLET ORAL 2 TIMES DAILY
Qty: 28 TABLET | Refills: 0 | Status: SHIPPED | OUTPATIENT
Start: 2023-09-22 | End: 2023-10-06

## 2023-09-22 RX ORDER — DEXTROMETHORPHAN HYDROBROMIDE AND PROMETHAZINE HYDROCHLORIDE 15; 6.25 MG/5ML; MG/5ML
5 SYRUP ORAL 4 TIMES DAILY PRN
Qty: 180 ML | Refills: 1 | Status: SHIPPED | OUTPATIENT
Start: 2023-09-22

## 2023-09-22 RX ORDER — HYDROCODONE BITARTRATE AND ACETAMINOPHEN 7.5; 325 MG/1; MG/1
1 TABLET ORAL EVERY 6 HOURS PRN
Qty: 90 TABLET | Refills: 0 | Status: SHIPPED | OUTPATIENT
Start: 2023-09-22

## 2023-09-22 RX ORDER — MIRTAZAPINE 45 MG/1
45 TABLET, FILM COATED ORAL
Qty: 30 TABLET | Refills: 2 | Status: SHIPPED | OUTPATIENT
Start: 2023-09-22

## 2023-09-22 NOTE — PROGRESS NOTES
Assessment/Plan:    Problem List Items Addressed This Visit    None  Visit Diagnoses     Encounter for screening mammogram for malignant neoplasm of breast    -  Primary    Recurrent major depressive disorder, in partial remission ([address])        Chronic obstructive pulmonary disease, unspecified COPD type ([address])        Chronic neck and back pain        Neuropathy               Diagnoses and all orders for this visit:    Encounter for screening mammogram for malignant neoplasm of breast    Recurrent major depressive disorder, in partial remission ([address])    Chronic obstructive pulmonary disease, unspecified COPD type ([address])    Chronic neck and back pain    Neuropathy        No problem-specific Assessment & Plan notes found for this encounter. PHQ-2/9 Depression Screening            Body mass index is 24.14 kg/m². BMI Counseling: Body mass index is 24.14 kg/m². The BMI     Subjective:      Patient ID: Tamika Sanchez is a 47 y.o. female. HPI    The following portions of the patient's history were reviewed and updated as appropriate:   She has a past medical history of Allergies, Anxiety, Asthma, Bereavement, Bipolar disorder ([address]), Carpal tunnel syndrome, bilateral, Chronic bronchitis ([address]), Chronic constipation, Chronic low back pain, Chronic neck pain, COPD (chronic obstructive pulmonary disease) ([address]), Costochondritis, DDD (degenerative disc disease), cervical, Degenerative joint disease of left hip, Depression, Diverticulitis, DJD (degenerative joint disease) of knee, DJD (degenerative joint disease), ankle and foot, Edema, Fatigue, GABE (generalized anxiety disorder), GERD (gastroesophageal reflux disease), Hernia, inguinal, Hernia, umbilical, Hyperlipidemia, Hypertension, IBS (irritable bowel syndrome), Insomnia, Lower GI bleed, Migraine, Neuropathy, PTSD (post-traumatic stress disorder), Raynaud disease, Scalp psoriasis, Situational depression, and Tobacco dependence. ,  does not have any pertinent problems on file.,   has a past surgical history that includes Chest tube insertion; DXA procedure(historical) (09/04/2009 & 8/24/2005); Mammo (historical) (02/14/2014); Hernia repair; and Cryotherapy. ,  family history includes Anxiety disorder in her other; Arthritis in her other; Cancer in her other; Depression in her other; Diabetes in her other; Heart disease in her other; No Known Problems in her maternal uncle, paternal uncle, and paternal uncle; Pulmonary fibrosis in her maternal aunt; Rheum arthritis in her maternal aunt; Stomach cancer in her father. ,   reports that she has been smoking cigarettes. She started smoking about 41 years ago. She has been smoking an average of 2 packs per day. She has been exposed to tobacco smoke. She has never used smokeless tobacco. She reports current alcohol use. She reports that she does not currently use drugs. ,  is allergic to erythromycin. .  Current Outpatient Medications   Medication Sig Dispense Refill   • albuterol (2.5 mg/3 mL) 0.083 % nebulizer solution Take 3 mL (2.5 mg total) by nebulization every 6 (six) hours as needed for wheezing or shortness of breath 180 mL 5   • atorvastatin (LIPITOR) 10 mg tablet Take 1 tablet (10 mg total) by mouth daily 90 tablet 1   • benzonatate (TESSALON) 200 MG capsule Take 1 capsule (200 mg total) by mouth 3 (three) times a day as needed for cough 30 capsule 2   • Calcium Carbonate 1500 (600 Ca) MG TABS take 1 tablet by mouth twice a day with meals 180 tablet 1   • diclofenac (VOLTAREN) 75 mg EC tablet Take 1 tablet (75 mg total) by mouth 2 (two) times a day 60 tablet 5   • fluticasone-umeclidinium-vilanterol (Trelegy Ellipta) 200-62.5-25 mcg/actuation AEPB inhaler Inhale 1 puff daily Rinse mouth after use.  60 blister 5   • HYDROcodone-acetaminophen (NORCO) 7.5-325 mg per tablet Take 1 tablet by mouth every 6 (six) hours as needed for pain Max Daily Amount: 3 tablets 90 tablet 0   • hydrOXYzine pamoate (VISTARIL) 50 mg capsule Take 1 capsule (50 mg total) by mouth 3 (three) times a day as needed for itching 30 capsule 5   • loratadine (CLARITIN) 10 mg tablet Take 1 tablet (10 mg total) by mouth daily 90 tablet 1   • methocarbamol (ROBAXIN) 750 mg tablet Take 1 tablet (750 mg total) by mouth 4 (four) times a day 120 tablet 3   • methylPREDNISolone 4 MG tablet therapy pack Use as directed on package 21 each 0   • mirtazapine (REMERON) 45 MG tablet Take 1 tablet (45 mg total) by mouth daily at bedtime 30 tablet 2   • montelukast (SINGULAIR) 10 mg tablet Take 1 tablet (10 mg total) by mouth daily at bedtime 90 tablet 3   • Multiple Vitamins-Minerals (MULTIVITAMIN ADULT PO) Take by mouth     • nicotine (NICODERM CQ) 21 mg/24 hr TD 24 hr patch Place 1 patch on the skin every 24 hours (Patient taking differently: Place 1 patch on the skin every 24 hours Uses off and on ) 28 patch 11   • olopatadine (PATANOL) 0.1 % ophthalmic solution Administer 1 drop to both eyes 2 (two) times a day 5 mL 3   • Promethazine-DM (PHENERGAN-DM) 6.25-15 mg/5 mL oral syrup Take 5 mL by mouth 4 (four) times a day as needed for cough (Patient not taking: Reported on 5/22/2023) 180 mL 1   • pyridoxine (VITAMIN B6) 50 mg tablet Take 1 tablet (50 mg total) by mouth daily 90 tablet 1   • QUEtiapine (SEROquel) 100 mg tablet Take 1 tablet (100 mg total) by mouth daily at bedtime 90 tablet 1   • topiramate (TOPAMAX) 50 MG tablet Take 50 mg by mouth 2 (two) times a day (Patient not taking: Reported on 3/11/2022 )     • triamcinolone (KENALOG) 0.1 % cream Apply topically 2 (two) times a day 30 g 0   • TURMERIC PO Take 1 tablet by mouth       No current facility-administered medications for this visit. Review of Systems   Constitutional: Negative for chills and fever. HENT: Negative for ear pain and sore throat. Eyes: Negative for pain and visual disturbance. Respiratory: Positive for cough and wheezing. Negative for shortness of breath.     Cardiovascular: Negative for chest pain and palpitations. Gastrointestinal: Negative for abdominal pain and vomiting. Genitourinary: Negative for dysuria and hematuria. Musculoskeletal: Positive for arthralgias, back pain and neck pain. Skin: Negative for color change and rash. Neurological: Negative for seizures and syncope. All other systems reviewed and are negative. Objective:    /80   Pulse 84   Temp 97.7 °F (36.5 °C)   Resp 20   Ht 5' 2" (1.575 m)   Wt 59.9 kg (132 lb)   BMI 24.14 kg/m²   Body mass index is 24.14 kg/m². Physical Exam  Constitutional:       Appearance: Normal appearance. She is well-developed. HENT:      Head: Normocephalic and atraumatic. Right Ear: Tympanic membrane, ear canal and external ear normal.      Left Ear: Tympanic membrane, ear canal and external ear normal.      Nose: Nose normal.      Mouth/Throat:      Mouth: Mucous membranes are moist.      Pharynx: Oropharynx is clear. Eyes:      Extraocular Movements: Extraocular movements intact. Conjunctiva/sclera: Conjunctivae normal.      Pupils: Pupils are equal, round, and reactive to light. Cardiovascular:      Rate and Rhythm: Normal rate and regular rhythm. Pulses: Normal pulses. Heart sounds: Normal heart sounds. Pulmonary:      Effort: Pulmonary effort is normal.      Breath sounds: Normal breath sounds. Abdominal:      General: Abdomen is flat. Bowel sounds are normal.      Palpations: Abdomen is soft. Tenderness: There is no abdominal tenderness. Musculoskeletal:         General: Normal range of motion. Cervical back: Normal range of motion and neck supple. Skin:     General: Skin is warm and dry. Capillary Refill: Capillary refill takes less than 2 seconds. Neurological:      General: No focal deficit present. Mental Status: She is alert and oriented to person, place, and time.    Psychiatric:         Mood and Affect: Mood normal.         Behavior: Behavior normal. Thought Content:  Thought content normal.         Judgment: Judgment normal.

## 2023-09-22 NOTE — PROGRESS NOTES
Assessment/Plan: Acute bronchitis in the setting of severe COPD we will provide doxycycline 100 mg twice daily and Promethazine DM    Left-sided neck pain with with thoracic or lumbar pain treated with hydrocodone PDMP has been reviewed no issues found no evidence of divergence or abuse    COPD with asthmatic component with continued tobacco use disorder. The patient has been a nicotine replacement failure and is not a candidate for Chantix or Wellbutrin    Anxiety is treated with Vistaril 50 mg 3 times daily as needed    Recurrent major depression is treated with Seroquel and Remeron    Peripheral neuropathy treated with B6 daily    Problem List Items Addressed This Visit        Other    Tobacco use disorder   Other Visit Diagnoses     Bronchitis    -  Primary    Relevant Medications    fluticasone-umeclidinium-vilanterol (Trelegy Ellipta) 200-62.5-25 mcg/actuation AEPB inhaler    doxycycline hyclate (VIBRA-TABS) 100 mg tablet    promethazine-dextromethorphan (PHENERGAN-DM) 6.25-15 mg/5 mL oral syrup    Chronic neck and back pain        Relevant Medications    HYDROcodone-acetaminophen (NORCO) 7.5-325 mg per tablet    Chronic obstructive pulmonary disease, unspecified COPD type ([address])        Relevant Medications    fluticasone-umeclidinium-vilanterol (Trelegy Ellipta) 200-62.5-25 mcg/actuation AEPB inhaler    promethazine-dextromethorphan (PHENERGAN-DM) 6.25-15 mg/5 mL oral syrup    Recurrent major depressive disorder, in partial remission ([facility])        Relevant Medications    mirtazapine (REMERON) 45 MG tablet    Neuropathy        Relevant Medications    pyridoxine (VITAMIN B6) 50 mg tablet    Encounter for screening mammogram for malignant neoplasm of breast        Relevant Orders    Mammo screening bilateral w 3d & cad           Diagnoses and all orders for this visit:    Bronchitis  -     doxycycline hyclate (VIBRA-TABS) 100 mg tablet;  Take 1 tablet (100 mg total) by mouth 2 (two) times a day for 14 days    Chronic neck and back pain  -     HYDROcodone-acetaminophen (NORCO) 7.5-325 mg per tablet; Take 1 tablet by mouth every 6 (six) hours as needed for pain Max Daily Amount: 3 tablets    Chronic obstructive pulmonary disease, unspecified COPD type ([address])  -     fluticasone-umeclidinium-vilanterol (Trelegy Ellipta) 200-62.5-25 mcg/actuation AEPB inhaler; Inhale 1 puff daily Rinse mouth after use. -     promethazine-dextromethorphan (PHENERGAN-DM) 6.25-15 mg/5 mL oral syrup; Take 5 mL by mouth 4 (four) times a day as needed for cough    Tobacco use disorder    Recurrent major depressive disorder, in partial remission (HCC)  -     mirtazapine (REMERON) 45 MG tablet; Take 1 tablet (45 mg total) by mouth daily at bedtime    Neuropathy  -     pyridoxine (VITAMIN B6) 50 mg tablet; Take 1 tablet (50 mg total) by mouth daily    Encounter for screening mammogram for malignant neoplasm of breast  -     Mammo screening bilateral w 3d & cad; Future        No problem-specific Assessment & Plan notes found for this encounter. PHQ-2/9 Depression Screening            Body mass index is 24.14 kg/m². BMI Counseling: Body mass index is 24.14 kg/m². The BMI   Subjective:      Patient ID: Tamika Sanchez is a 47 y.o. female.     Patient presents for checkup with complaint of cough productive of yellowish phlegm shortness of breath and wheezing      The following portions of the patient's history were reviewed and updated as appropriate:   She has a past medical history of Allergies, Anxiety, Asthma, Bereavement, Bipolar disorder ([address]), Carpal tunnel syndrome, bilateral, Chronic bronchitis ([address]), Chronic constipation, Chronic low back pain, Chronic neck pain, COPD (chronic obstructive pulmonary disease) ([address]), Costochondritis, DDD (degenerative disc disease), cervical, Degenerative joint disease of left hip, Depression, Diverticulitis, DJD (degenerative joint disease) of knee, DJD (degenerative joint disease), ankle and foot, Edema, Fatigue, GAEB (generalized anxiety disorder), GERD (gastroesophageal reflux disease), Hernia, inguinal, Hernia, umbilical, Hyperlipidemia, Hypertension, IBS (irritable bowel syndrome), Insomnia, Lower GI bleed, Migraine, Neuropathy, PTSD (post-traumatic stress disorder), Raynaud disease, Scalp psoriasis, Situational depression, and Tobacco dependence. ,  does not have any pertinent problems on file. ,   has a past surgical history that includes Chest tube insertion; DXA procedure(historical) (09/04/2009 & 8/24/2005); Mammo (historical) (02/14/2014); Hernia repair; and Cryotherapy. ,  family history includes Anxiety disorder in her other; Arthritis in her other; Cancer in her other; Depression in her other; Diabetes in her other; Heart disease in her other; No Known Problems in her maternal uncle, paternal uncle, and paternal uncle; Pulmonary fibrosis in her maternal aunt; Rheum arthritis in her maternal aunt; Stomach cancer in her father. ,   reports that she has been smoking cigarettes. She started smoking about 41 years ago. She has been smoking an average of 2 packs per day. She has been exposed to tobacco smoke. She has never used smokeless tobacco. She reports current alcohol use. She reports that she does not currently use drugs. ,  is allergic to erythromycin. .  Current Outpatient Medications   Medication Sig Dispense Refill   • doxycycline hyclate (VIBRA-TABS) 100 mg tablet Take 1 tablet (100 mg total) by mouth 2 (two) times a day for 14 days 28 tablet 0   • fluticasone-umeclidinium-vilanterol (Trelegy Ellipta) 200-62.5-25 mcg/actuation AEPB inhaler Inhale 1 puff daily Rinse mouth after use.  60 blister 5   • HYDROcodone-acetaminophen (NORCO) 7.5-325 mg per tablet Take 1 tablet by mouth every 6 (six) hours as needed for pain Max Daily Amount: 3 tablets 90 tablet 0   • mirtazapine (REMERON) 45 MG tablet Take 1 tablet (45 mg total) by mouth daily at bedtime 30 tablet 2   • promethazine-dextromethorphan (PHENERGAN-DM) 6.25-15 mg/5 mL oral syrup Take 5 mL by mouth 4 (four) times a day as needed for cough 180 mL 1   • pyridoxine (VITAMIN B6) 50 mg tablet Take 1 tablet (50 mg total) by mouth daily 90 tablet 1   • albuterol (2.5 mg/3 mL) 0.083 % nebulizer solution Take 3 mL (2.5 mg total) by nebulization every 6 (six) hours as needed for wheezing or shortness of breath 180 mL 5   • atorvastatin (LIPITOR) 10 mg tablet Take 1 tablet (10 mg total) by mouth daily 90 tablet 1   • benzonatate (TESSALON) 200 MG capsule Take 1 capsule (200 mg total) by mouth 3 (three) times a day as needed for cough 30 capsule 2   • Calcium Carbonate 1500 (600 Ca) MG TABS take 1 tablet by mouth twice a day with meals 180 tablet 1   • diclofenac (VOLTAREN) 75 mg EC tablet Take 1 tablet (75 mg total) by mouth 2 (two) times a day 60 tablet 5   • hydrOXYzine pamoate (VISTARIL) 50 mg capsule Take 1 capsule (50 mg total) by mouth 3 (three) times a day as needed for itching 30 capsule 5   • loratadine (CLARITIN) 10 mg tablet Take 1 tablet (10 mg total) by mouth daily 90 tablet 1   • methocarbamol (ROBAXIN) 750 mg tablet Take 1 tablet (750 mg total) by mouth 4 (four) times a day 120 tablet 3   • methylPREDNISolone 4 MG tablet therapy pack Use as directed on package 21 each 0   • montelukast (SINGULAIR) 10 mg tablet Take 1 tablet (10 mg total) by mouth daily at bedtime 90 tablet 3   • Multiple Vitamins-Minerals (MULTIVITAMIN ADULT PO) Take by mouth     • nicotine (NICODERM CQ) 21 mg/24 hr TD 24 hr patch Place 1 patch on the skin every 24 hours (Patient taking differently: Place 1 patch on the skin every 24 hours Uses off and on ) 28 patch 11   • olopatadine (PATANOL) 0.1 % ophthalmic solution Administer 1 drop to both eyes 2 (two) times a day 5 mL 3   • QUEtiapine (SEROquel) 100 mg tablet Take 1 tablet (100 mg total) by mouth daily at bedtime 90 tablet 1   • topiramate (TOPAMAX) 50 MG tablet Take 50 mg by mouth 2 (two) times a day (Patient not taking: Reported on 3/11/2022 )     • triamcinolone (KENALOG) 0.1 % cream Apply topically 2 (two) times a day 30 g 0   • TURMERIC PO Take 1 tablet by mouth       No current facility-administered medications for this visit. Review of Systems      Objective:    /80   Pulse 84   Temp 97.7 °F (36.5 °C)   Resp 20   Ht 5' 2" (1.575 m)   Wt 59.9 kg (132 lb)   BMI 24.14 kg/m²   Body mass index is 24.14 kg/m². Physical Exam  Constitutional:       Appearance: Normal appearance. She is well-developed. HENT:      Head: Normocephalic and atraumatic. Right Ear: Tympanic membrane, ear canal and external ear normal.      Left Ear: Tympanic membrane, ear canal and external ear normal.      Nose: Nose normal.      Mouth/Throat:      Mouth: Mucous membranes are moist.      Pharynx: Oropharynx is clear. Eyes:      Extraocular Movements: Extraocular movements intact. Conjunctiva/sclera: Conjunctivae normal.      Pupils: Pupils are equal, round, and reactive to light. Cardiovascular:      Rate and Rhythm: Normal rate and regular rhythm. Pulses: Normal pulses. Heart sounds: Normal heart sounds. Pulmonary:      Effort: Pulmonary effort is normal.      Breath sounds: Rhonchi present. Abdominal:      General: Abdomen is flat. Bowel sounds are normal.      Palpations: Abdomen is soft. Tenderness: There is no abdominal tenderness. Musculoskeletal:         General: Normal range of motion. Cervical back: Normal range of motion and neck supple. Comments: Thoracolumbar paraspinal spasm with bilateral SI tenderness decreased range of motion of the cervical spine with multiple trigger points   Skin:     General: Skin is warm and dry. Capillary Refill: Capillary refill takes less than 2 seconds. Neurological:      General: No focal deficit present. Mental Status: She is alert and oriented to person, place, and time.    Psychiatric:         Mood and Affect: Mood normal. Behavior: Behavior normal.         Thought Content:  Thought content normal.         Judgment: Judgment normal.

## 2023-10-23 ENCOUNTER — TELEPHONE (OUTPATIENT)
Dept: FAMILY MEDICINE CLINIC | Facility: CLINIC | Age: 53
End: 2023-10-23

## 2023-10-23 ENCOUNTER — OFFICE VISIT (OUTPATIENT)
Dept: FAMILY MEDICINE CLINIC | Facility: CLINIC | Age: 53
End: 2023-10-23
Payer: COMMERCIAL

## 2023-10-23 VITALS
HEIGHT: 62 IN | SYSTOLIC BLOOD PRESSURE: 124 MMHG | TEMPERATURE: 98.4 F | HEART RATE: 84 BPM | WEIGHT: 136 LBS | DIASTOLIC BLOOD PRESSURE: 84 MMHG | RESPIRATION RATE: 20 BRPM | BODY MASS INDEX: 25.03 KG/M2

## 2023-10-23 DIAGNOSIS — T78.40XD ALLERGY, SUBSEQUENT ENCOUNTER: ICD-10-CM

## 2023-10-23 DIAGNOSIS — G89.29 CHRONIC NECK AND BACK PAIN: Primary | Chronic | ICD-10-CM

## 2023-10-23 DIAGNOSIS — M54.2 CHRONIC NECK AND BACK PAIN: Primary | Chronic | ICD-10-CM

## 2023-10-23 DIAGNOSIS — J45.40 MODERATE PERSISTENT ASTHMA, UNSPECIFIED WHETHER COMPLICATED: ICD-10-CM

## 2023-10-23 DIAGNOSIS — R05.3 CHRONIC COUGH: ICD-10-CM

## 2023-10-23 DIAGNOSIS — M54.9 CHRONIC NECK AND BACK PAIN: Primary | Chronic | ICD-10-CM

## 2023-10-23 DIAGNOSIS — F17.200 TOBACCO USE DISORDER: ICD-10-CM

## 2023-10-23 DIAGNOSIS — J44.9 CHRONIC OBSTRUCTIVE PULMONARY DISEASE, UNSPECIFIED COPD TYPE (HCC): ICD-10-CM

## 2023-10-23 PROCEDURE — 99214 OFFICE O/P EST MOD 30 MIN: CPT | Performed by: FAMILY MEDICINE

## 2023-10-23 RX ORDER — METHYLPREDNISOLONE 4 MG/1
TABLET ORAL
Qty: 21 EACH | Refills: 0 | Status: SHIPPED | OUTPATIENT
Start: 2023-10-23

## 2023-10-23 RX ORDER — HYDROCODONE BITARTRATE AND ACETAMINOPHEN 7.5; 325 MG/1; MG/1
1 TABLET ORAL EVERY 6 HOURS PRN
Qty: 90 TABLET | Refills: 0 | Status: SHIPPED | OUTPATIENT
Start: 2023-10-23

## 2023-10-23 RX ORDER — KETOTIFEN FUMARATE 0.35 MG/ML
1 SOLUTION/ DROPS OPHTHALMIC 2 TIMES DAILY
Qty: 5 ML | Refills: 5 | Status: SHIPPED | OUTPATIENT
Start: 2023-10-23

## 2023-10-23 RX ORDER — BENZONATATE 200 MG/1
200 CAPSULE ORAL 3 TIMES DAILY PRN
Qty: 30 CAPSULE | Refills: 2 | Status: SHIPPED | OUTPATIENT
Start: 2023-10-23

## 2023-10-23 NOTE — PROGRESS NOTES
Assessment/Plan: Chronic neck and back pain treated with Voltaren 75 mg twice daily Robaxin-750 twice daily and hydrocodone 3 times daily as needed. PDMP reviewed no issues found no evidence of diversion's or abuse    Chronic obstructive pulmonary disease. The patient uses Trelegy as a maintenance inhaler and albuterol as rescue inhaler patient has productive cough we will renew Tessalon Perles    Tobacco use disorder with chronic cough he is trying to cut down by using a nicotine she is wondering when to the office visit    I allergies the patient currently is on Claritin and Singulair will add zatador eyedrops    Problem List Items Addressed This Visit       Tobacco use disorder     Other Visit Diagnoses       Chronic neck and back pain  (Chronic)   -  Primary    Relevant Medications    HYDROcodone-acetaminophen (NORCO) 7.5-325 mg per tablet    methylPREDNISolone 4 MG tablet therapy pack    Chronic cough        Chronic obstructive pulmonary disease, unspecified COPD type ([address])        Relevant Medications    benzonatate (TESSALON) 200 MG capsule    methylPREDNISolone 4 MG tablet therapy pack    Moderate persistent asthma, unspecified whether complicated        Allergy, subsequent encounter        Relevant Medications    Ketotifen Fumarate (ZADITOR) 0.035 % ophthalmic solution             Diagnoses and all orders for this visit:    Chronic neck and back pain  -     HYDROcodone-acetaminophen (NORCO) 7.5-325 mg per tablet; Take 1 tablet by mouth every 6 (six) hours as needed for pain Max Daily Amount: 3 tablets  -     methylPREDNISolone 4 MG tablet therapy pack; Use as directed on package    Chronic cough    Chronic obstructive pulmonary disease, unspecified COPD type ([facility])  -     benzonatate (TESSALON) 200 MG capsule;  Take 1 capsule (200 mg total) by mouth 3 (three) times a day as needed for cough    Moderate persistent asthma, unspecified whether complicated    Tobacco use disorder    Allergy, subsequent encounter  -     Ketotifen Fumarate (ZADITOR) 0.035 % ophthalmic solution; Administer 1 drop to both eyes 2 (two) times a day        No problem-specific Assessment & Plan notes found for this encounter. PHQ-2/9 Depression Screening              Body mass index is 24.87 kg/m². BMI Counseling: Body mass index is 24.87 kg/m². The BMI     Subjective:      Patient ID: Kelvin Aaron is a 47 y.o. female. Patient presents for checkup        The following portions of the patient's history were reviewed and updated as appropriate:   She has a past medical history of Allergies, Anxiety, Asthma, Bereavement, Bipolar disorder ([address]), Carpal tunnel syndrome, bilateral, Chronic bronchitis ([address]), Chronic constipation, Chronic low back pain, Chronic neck pain, COPD (chronic obstructive pulmonary disease) ([address]), Costochondritis, DDD (degenerative disc disease), cervical, Degenerative joint disease of left hip, Depression, Diverticulitis, DJD (degenerative joint disease) of knee, DJD (degenerative joint disease), ankle and foot, Edema, Fatigue, GABE (generalized anxiety disorder), GERD (gastroesophageal reflux disease), Hernia, inguinal, Hernia, umbilical, Hyperlipidemia, Hypertension, IBS (irritable bowel syndrome), Insomnia, Lower GI bleed, Migraine, Neuropathy, PTSD (post-traumatic stress disorder), Raynaud disease, Scalp psoriasis, Situational depression, and Tobacco dependence. ,  does not have any pertinent problems on file. ,   has a past surgical history that includes Chest tube insertion; DXA procedure(historical) (09/04/2009 & 8/24/2005); Mammo (historical) (02/14/2014); Hernia repair; and Cryotherapy. ,  family history includes Anxiety disorder in her other; Arthritis in her other; Cancer in her other; Depression in her other; Diabetes in her other; Heart disease in her other; No Known Problems in her maternal uncle, paternal uncle, and paternal uncle; Pulmonary fibrosis in her maternal aunt;  Rheum arthritis in her maternal aunt; Stomach cancer in her father. ,   reports that she has been smoking cigarettes. She started smoking about 41 years ago. She has been smoking an average of 2 packs per day. She has been exposed to tobacco smoke. She has never used smokeless tobacco. She reports current alcohol use. She reports that she does not currently use drugs. ,  is allergic to erythromycin. .  Current Outpatient Medications   Medication Sig Dispense Refill    benzonatate (TESSALON) 200 MG capsule Take 1 capsule (200 mg total) by mouth 3 (three) times a day as needed for cough 30 capsule 2    HYDROcodone-acetaminophen (NORCO) 7.5-325 mg per tablet Take 1 tablet by mouth every 6 (six) hours as needed for pain Max Daily Amount: 3 tablets 90 tablet 0    Ketotifen Fumarate (ZADITOR) 0.035 % ophthalmic solution Administer 1 drop to both eyes 2 (two) times a day 5 mL 5    methylPREDNISolone 4 MG tablet therapy pack Use as directed on package 21 each 0    albuterol (2.5 mg/3 mL) 0.083 % nebulizer solution Take 3 mL (2.5 mg total) by nebulization every 6 (six) hours as needed for wheezing or shortness of breath 180 mL 5    atorvastatin (LIPITOR) 10 mg tablet Take 1 tablet (10 mg total) by mouth daily 90 tablet 1    Calcium Carbonate 1500 (600 Ca) MG TABS take 1 tablet by mouth twice a day with meals 180 tablet 1    diclofenac (VOLTAREN) 75 mg EC tablet Take 1 tablet (75 mg total) by mouth 2 (two) times a day 60 tablet 5    fluticasone-umeclidinium-vilanterol (Trelegy Ellipta) 200-62.5-25 mcg/actuation AEPB inhaler Inhale 1 puff daily Rinse mouth after use.  60 blister 5    hydrOXYzine pamoate (VISTARIL) 50 mg capsule Take 1 capsule (50 mg total) by mouth 3 (three) times a day as needed for itching 30 capsule 5    loratadine (CLARITIN) 10 mg tablet Take 1 tablet (10 mg total) by mouth daily 90 tablet 1    methocarbamol (ROBAXIN) 750 mg tablet Take 1 tablet (750 mg total) by mouth 4 (four) times a day 120 tablet 3    mirtazapine (REMERON) 45 MG tablet Take 1 tablet (45 mg total) by mouth daily at bedtime 30 tablet 2    montelukast (SINGULAIR) 10 mg tablet Take 1 tablet (10 mg total) by mouth daily at bedtime 90 tablet 3    Multiple Vitamins-Minerals (MULTIVITAMIN ADULT PO) Take by mouth      nicotine (NICODERM CQ) 21 mg/24 hr TD 24 hr patch Place 1 patch on the skin every 24 hours (Patient taking differently: Place 1 patch on the skin every 24 hours Uses off and on ) 28 patch 11    promethazine-dextromethorphan (PHENERGAN-DM) 6.25-15 mg/5 mL oral syrup Take 5 mL by mouth 4 (four) times a day as needed for cough 180 mL 1    pyridoxine (VITAMIN B6) 50 mg tablet Take 1 tablet (50 mg total) by mouth daily 90 tablet 1    QUEtiapine (SEROquel) 100 mg tablet Take 1 tablet (100 mg total) by mouth daily at bedtime 90 tablet 1    topiramate (TOPAMAX) 50 MG tablet Take 50 mg by mouth 2 (two) times a day (Patient not taking: Reported on 3/11/2022 )      triamcinolone (KENALOG) 0.1 % cream Apply topically 2 (two) times a day 30 g 0    TURMERIC PO Take 1 tablet by mouth       No current facility-administered medications for this visit. Review of Systems   Constitutional:  Negative for chills and fever. HENT:  Negative for ear pain and sore throat. Eyes:  Positive for redness and itching. Negative for pain and visual disturbance. Respiratory:  Positive for shortness of breath and wheezing. Negative for cough. Cardiovascular:  Negative for chest pain and palpitations. Gastrointestinal:  Negative for abdominal pain and vomiting. Genitourinary:  Negative for dysuria and hematuria. Musculoskeletal:  Positive for back pain and neck pain. Negative for arthralgias. Skin:  Negative for color change and rash. Neurological:  Negative for seizures and syncope. All other systems reviewed and are negative.         Objective:    /84   Pulse 84   Temp 98.4 °F (36.9 °C)   Resp 20   Ht 5' 2" (1.575 m)   Wt 61.7 kg (136 lb)   BMI 24.87 kg/m²   Body mass index is 24.87 kg/m². Physical Exam  Constitutional:       Appearance: Normal appearance. She is well-developed. HENT:      Head: Normocephalic and atraumatic. Right Ear: Tympanic membrane, ear canal and external ear normal.      Left Ear: Tympanic membrane, ear canal and external ear normal.      Nose: Nose normal.      Mouth/Throat:      Mouth: Mucous membranes are moist.      Pharynx: Oropharynx is clear. Eyes:      General: Allergic shiner present. Extraocular Movements: Extraocular movements intact. Conjunctiva/sclera:      Right eye: Right conjunctiva is injected. Left eye: Left conjunctiva is injected. Pupils: Pupils are equal, round, and reactive to light. Cardiovascular:      Rate and Rhythm: Normal rate and regular rhythm. Pulses: Normal pulses. Heart sounds: Normal heart sounds. Pulmonary:      Effort: Pulmonary effort is normal.      Breath sounds: Rhonchi present. Abdominal:      General: Abdomen is flat. Bowel sounds are normal.      Palpations: Abdomen is soft. Tenderness: There is no abdominal tenderness. Musculoskeletal:         General: Normal range of motion. Cervical back: Normal range of motion and neck supple. Skin:     General: Skin is warm and dry. Capillary Refill: Capillary refill takes less than 2 seconds. Neurological:      General: No focal deficit present. Mental Status: She is alert and oriented to person, place, and time. Psychiatric:         Mood and Affect: Mood normal.         Behavior: Behavior normal.         Thought Content:  Thought content normal.         Judgment: Judgment normal.

## 2023-11-10 DIAGNOSIS — F17.200 TOBACCO USE DISORDER: ICD-10-CM

## 2023-11-13 RX ORDER — NICOTINE 21 MG/24HR
PATCH, TRANSDERMAL 24 HOURS TRANSDERMAL
Qty: 28 PATCH | Refills: 11 | Status: SHIPPED | OUTPATIENT
Start: 2023-11-13

## 2023-11-20 ENCOUNTER — OFFICE VISIT (OUTPATIENT)
Dept: FAMILY MEDICINE CLINIC | Facility: CLINIC | Age: 53
End: 2023-11-20
Payer: COMMERCIAL

## 2023-11-20 VITALS
TEMPERATURE: 98.4 F | SYSTOLIC BLOOD PRESSURE: 134 MMHG | HEIGHT: 62 IN | BODY MASS INDEX: 25.21 KG/M2 | RESPIRATION RATE: 20 BRPM | WEIGHT: 137 LBS | HEART RATE: 84 BPM | DIASTOLIC BLOOD PRESSURE: 68 MMHG

## 2023-11-20 DIAGNOSIS — M54.50 CHRONIC BILATERAL LOW BACK PAIN WITHOUT SCIATICA: ICD-10-CM

## 2023-11-20 DIAGNOSIS — F33.41 RECURRENT MAJOR DEPRESSIVE DISORDER, IN PARTIAL REMISSION (HCC): ICD-10-CM

## 2023-11-20 DIAGNOSIS — G89.29 CHRONIC BILATERAL LOW BACK PAIN WITHOUT SCIATICA: ICD-10-CM

## 2023-11-20 DIAGNOSIS — M54.2 CHRONIC NECK AND BACK PAIN: Chronic | ICD-10-CM

## 2023-11-20 DIAGNOSIS — M54.9 CHRONIC NECK AND BACK PAIN: Chronic | ICD-10-CM

## 2023-11-20 DIAGNOSIS — E78.2 MIXED HYPERLIPIDEMIA: ICD-10-CM

## 2023-11-20 DIAGNOSIS — G89.29 CHRONIC NECK AND BACK PAIN: Chronic | ICD-10-CM

## 2023-11-20 PROCEDURE — 99214 OFFICE O/P EST MOD 30 MIN: CPT | Performed by: FAMILY MEDICINE

## 2023-11-20 RX ORDER — ATORVASTATIN CALCIUM 10 MG/1
10 TABLET, FILM COATED ORAL DAILY
Qty: 90 TABLET | Refills: 1 | Status: SHIPPED | OUTPATIENT
Start: 2023-11-20

## 2023-11-20 RX ORDER — MIRTAZAPINE 45 MG/1
45 TABLET, FILM COATED ORAL
Qty: 30 TABLET | Refills: 2 | Status: SHIPPED | OUTPATIENT
Start: 2023-11-20

## 2023-11-20 RX ORDER — BACLOFEN 10 MG/1
10 TABLET ORAL 3 TIMES DAILY
Qty: 90 TABLET | Refills: 4 | Status: SHIPPED | OUTPATIENT
Start: 2023-11-20

## 2023-11-20 RX ORDER — PREDNISONE 10 MG/1
40 TABLET ORAL 2 TIMES DAILY WITH MEALS
Qty: 40 TABLET | Refills: 1 | Status: SHIPPED | OUTPATIENT
Start: 2023-11-20

## 2023-11-20 RX ORDER — HYDROCODONE BITARTRATE AND ACETAMINOPHEN 7.5; 325 MG/1; MG/1
1 TABLET ORAL EVERY 6 HOURS PRN
Qty: 90 TABLET | Refills: 0 | Status: SHIPPED | OUTPATIENT
Start: 2023-11-20

## 2023-11-20 NOTE — PROGRESS NOTES
Assessment/Plan: Chronic neck and back pain treated with Voltaren 75 mg twice daily. Robaxin has been less than effective we will discontinue Robaxin and begin baclofen 10 mg 3 times daily. Hydrocodone is taken 3 times daily as needed. PDMP reviewed no issues found no evidence of diversion or abuse    Chronic obstructive pulmonary disease the patient uses Trelegy as a maintenance inhaler and albuterol as a rescue inhaler    Tobacco use disorder with chronic cough the patient is trying to cut down by using a nicotine patch      Recurrent major depression Remeron improve his function    Mixed hyperlipidemia patient currently is on atorvastatin 10 mg daily laboratory is pending  Problem List Items Addressed This Visit     Chronic bilateral low back pain without sciatica    Relevant Medications    Calcium Carbonate 1500 (600 Ca) MG TABS    Mixed hyperlipidemia    Relevant Medications    atorvastatin (LIPITOR) 10 mg tablet   Other Visit Diagnoses     Chronic neck and back pain  (Chronic)       Relevant Medications    HYDROcodone-acetaminophen (NORCO) 7.5-325 mg per tablet    predniSONE 10 mg tablet    baclofen 10 mg tablet    Recurrent major depressive disorder, in partial remission (HCC)        Relevant Medications    mirtazapine (REMERON) 45 MG tablet           Diagnoses and all orders for this visit:    Chronic neck and back pain  -     HYDROcodone-acetaminophen (NORCO) 7.5-325 mg per tablet; Take 1 tablet by mouth every 6 (six) hours as needed for pain Max Daily Amount: 3 tablets  -     predniSONE 10 mg tablet; Take 4 tablets (40 mg total) by mouth 2 (two) times a day with meals  -     baclofen 10 mg tablet;  Take 1 tablet (10 mg total) by mouth 3 (three) times a day    Chronic bilateral low back pain without sciatica  -     Calcium Carbonate 1500 (600 Ca) MG TABS; Take 1,500 mg by mouth 2 (two) times a day with meals    Recurrent major depressive disorder, in partial remission (HCC)  -     mirtazapine (REMERON) 45 MG tablet; Take 1 tablet (45 mg total) by mouth daily at bedtime    Mixed hyperlipidemia  -     atorvastatin (LIPITOR) 10 mg tablet; Take 1 tablet (10 mg total) by mouth daily        No problem-specific Assessment & Plan notes found for this encounter. PHQ-2/9 Depression Screening            Body mass index is 25.06 kg/m². BMI Counseling: Body mass index is 25.06 kg/m². The BMI     Subjective:      Patient ID: Angelo Donaldson is a 47 y.o. female. Patient presents for 1 month checkup with complaint that her neck has been going out frequently and that Robaxin is not helping    Medication Refill  Associated symptoms include arthralgias and neck pain. Pertinent negatives include no abdominal pain, chest pain, chills, coughing, fever, rash, sore throat or vomiting. The following portions of the patient's history were reviewed and updated as appropriate:   She has a past medical history of Allergies, Anxiety, Asthma, Bereavement, Bipolar disorder ([address]), Carpal tunnel syndrome, bilateral, Chronic bronchitis ([address]), Chronic constipation, Chronic low back pain, Chronic neck pain, COPD (chronic obstructive pulmonary disease) ([address]), Costochondritis, DDD (degenerative disc disease), cervical, Degenerative joint disease of left hip, Depression, Diverticulitis, DJD (degenerative joint disease) of knee, DJD (degenerative joint disease), ankle and foot, Edema, Fatigue, GABE (generalized anxiety disorder), GERD (gastroesophageal reflux disease), Hernia, inguinal, Hernia, umbilical, Hyperlipidemia, Hypertension, IBS (irritable bowel syndrome), Insomnia, Lower GI bleed, Migraine, Neuropathy, PTSD (post-traumatic stress disorder), Raynaud disease, Scalp psoriasis, Situational depression, and Tobacco dependence. ,  does not have any pertinent problems on file. ,   has a past surgical history that includes Chest tube insertion; DXA procedure(historical) (09/04/2009 & 8/24/2005); Mammo (historical) (02/14/2014);  Hernia repair; and Cryotherapy. ,  family history includes Anxiety disorder in her other; Arthritis in her other; Cancer in her other; Depression in her other; Diabetes in her other; Heart disease in her other; No Known Problems in her maternal uncle, paternal uncle, and paternal uncle; Pulmonary fibrosis in her maternal aunt; Rheum arthritis in her maternal aunt; Stomach cancer in her father. ,   reports that she has been smoking cigarettes. She started smoking about 41 years ago. She has been smoking an average of 2 packs per day. She has been exposed to tobacco smoke. She has never used smokeless tobacco. She reports current alcohol use. She reports that she does not currently use drugs. ,  is allergic to erythromycin. .  Current Outpatient Medications   Medication Sig Dispense Refill   • atorvastatin (LIPITOR) 10 mg tablet Take 1 tablet (10 mg total) by mouth daily 90 tablet 1   • baclofen 10 mg tablet Take 1 tablet (10 mg total) by mouth 3 (three) times a day 90 tablet 4   • Calcium Carbonate 1500 (600 Ca) MG TABS Take 1,500 mg by mouth 2 (two) times a day with meals 180 tablet 1   • HYDROcodone-acetaminophen (NORCO) 7.5-325 mg per tablet Take 1 tablet by mouth every 6 (six) hours as needed for pain Max Daily Amount: 3 tablets 90 tablet 0   • mirtazapine (REMERON) 45 MG tablet Take 1 tablet (45 mg total) by mouth daily at bedtime 30 tablet 2   • predniSONE 10 mg tablet Take 4 tablets (40 mg total) by mouth 2 (two) times a day with meals 40 tablet 1   • albuterol (2.5 mg/3 mL) 0.083 % nebulizer solution Take 3 mL (2.5 mg total) by nebulization every 6 (six) hours as needed for wheezing or shortness of breath 180 mL 5   • benzonatate (TESSALON) 200 MG capsule Take 1 capsule (200 mg total) by mouth 3 (three) times a day as needed for cough 30 capsule 2   • diclofenac (VOLTAREN) 75 mg EC tablet Take 1 tablet (75 mg total) by mouth 2 (two) times a day 60 tablet 5   • fluticasone-umeclidinium-vilanterol (Trelegy Ellipta) 200-62.5-25 mcg/actuation AEPB inhaler Inhale 1 puff daily Rinse mouth after use. 60 blister 5   • hydrOXYzine pamoate (VISTARIL) 50 mg capsule Take 1 capsule (50 mg total) by mouth 3 (three) times a day as needed for itching 30 capsule 5   • Ketotifen Fumarate (ZADITOR) 0.035 % ophthalmic solution Administer 1 drop to both eyes 2 (two) times a day 5 mL 5   • loratadine (CLARITIN) 10 mg tablet Take 1 tablet (10 mg total) by mouth daily 90 tablet 1   • methylPREDNISolone 4 MG tablet therapy pack Use as directed on package 21 each 0   • montelukast (SINGULAIR) 10 mg tablet Take 1 tablet (10 mg total) by mouth daily at bedtime 90 tablet 3   • Multiple Vitamins-Minerals (MULTIVITAMIN ADULT PO) Take by mouth     • nicotine (NICODERM CQ) 21 mg/24 hr TD 24 hr patch apply 1 patch to CLEAN, DRY, AND INTACT SKIN REMOVE AND REPLACE every 24 hours 28 patch 11   • promethazine-dextromethorphan (PHENERGAN-DM) 6.25-15 mg/5 mL oral syrup Take 5 mL by mouth 4 (four) times a day as needed for cough (Patient not taking: Reported on 11/20/2023) 180 mL 1   • pyridoxine (VITAMIN B6) 50 mg tablet Take 1 tablet (50 mg total) by mouth daily 90 tablet 1   • QUEtiapine (SEROquel) 100 mg tablet Take 1 tablet (100 mg total) by mouth daily at bedtime 90 tablet 1   • topiramate (TOPAMAX) 50 MG tablet Take 50 mg by mouth 2 (two) times a day (Patient not taking: Reported on 3/11/2022 )     • triamcinolone (KENALOG) 0.1 % cream Apply topically 2 (two) times a day 30 g 0   • TURMERIC PO Take 1 tablet by mouth       No current facility-administered medications for this visit. Review of Systems   Constitutional:  Negative for chills and fever. HENT:  Negative for ear pain and sore throat. Eyes:  Negative for pain and visual disturbance. Respiratory:  Negative for cough and shortness of breath. Cardiovascular:  Negative for chest pain and palpitations. Gastrointestinal:  Negative for abdominal pain and vomiting.    Genitourinary:  Negative for dysuria and hematuria. Musculoskeletal:  Positive for arthralgias, neck pain and neck stiffness. Skin:  Negative for color change and rash. Neurological:  Negative for seizures and syncope. All other systems reviewed and are negative. Objective:    /68   Pulse 84   Temp 98.4 °F (36.9 °C)   Resp 20   Ht 5' 2" (1.575 m)   Wt 62.1 kg (137 lb)   BMI 25.06 kg/m²   Body mass index is 25.06 kg/m². Physical Exam  Constitutional:       Appearance: Normal appearance. She is well-developed. HENT:      Head: Normocephalic and atraumatic. Right Ear: Tympanic membrane, ear canal and external ear normal.      Left Ear: Tympanic membrane, ear canal and external ear normal.      Nose: Nose normal.      Mouth/Throat:      Mouth: Mucous membranes are moist.      Pharynx: Oropharynx is clear. Eyes:      Extraocular Movements: Extraocular movements intact. Conjunctiva/sclera: Conjunctivae normal.      Pupils: Pupils are equal, round, and reactive to light. Cardiovascular:      Rate and Rhythm: Normal rate and regular rhythm. Pulses: Normal pulses. Heart sounds: Normal heart sounds. Pulmonary:      Effort: Pulmonary effort is normal.      Breath sounds: Normal breath sounds. Abdominal:      General: Abdomen is flat. Bowel sounds are normal.      Palpations: Abdomen is soft. Tenderness: There is no abdominal tenderness. Musculoskeletal:         General: Normal range of motion. Cervical back: Normal range of motion and neck supple. Skin:     General: Skin is warm and dry. Capillary Refill: Capillary refill takes less than 2 seconds. Neurological:      General: No focal deficit present. Mental Status: She is alert and oriented to person, place, and time. Psychiatric:         Mood and Affect: Mood normal.         Behavior: Behavior normal.         Thought Content:  Thought content normal.         Judgment: Judgment normal.

## 2023-11-21 ENCOUNTER — TELEPHONE (OUTPATIENT)
Dept: FAMILY MEDICINE CLINIC | Facility: CLINIC | Age: 53
End: 2023-11-21

## 2023-11-21 NOTE — TELEPHONE ENCOUNTER
PA requested for Norco 7.5/325mg - TID #[id number] - sent through [facility]

## 2023-11-29 ENCOUNTER — TELEPHONE (OUTPATIENT)
Dept: FAMILY MEDICINE CLINIC | Facility: CLINIC | Age: 53
End: 2023-11-29

## 2023-11-29 DIAGNOSIS — M54.50 CHRONIC BILATERAL LOW BACK PAIN WITHOUT SCIATICA: ICD-10-CM

## 2023-11-29 DIAGNOSIS — G89.29 CHRONIC BILATERAL LOW BACK PAIN WITHOUT SCIATICA: ICD-10-CM

## 2023-12-18 ENCOUNTER — OFFICE VISIT (OUTPATIENT)
Dept: FAMILY MEDICINE CLINIC | Facility: CLINIC | Age: 53
End: 2023-12-18
Payer: COMMERCIAL

## 2023-12-18 VITALS
HEIGHT: 62 IN | SYSTOLIC BLOOD PRESSURE: 126 MMHG | RESPIRATION RATE: 20 BRPM | DIASTOLIC BLOOD PRESSURE: 76 MMHG | TEMPERATURE: 97.7 F | BODY MASS INDEX: 24.84 KG/M2 | WEIGHT: 135 LBS | HEART RATE: 84 BPM

## 2023-12-18 DIAGNOSIS — E55.9 VITAMIN D DEFICIENCY: ICD-10-CM

## 2023-12-18 DIAGNOSIS — J44.9 CHRONIC OBSTRUCTIVE PULMONARY DISEASE, UNSPECIFIED COPD TYPE (HCC): Primary | ICD-10-CM

## 2023-12-18 DIAGNOSIS — E78.2 MIXED HYPERLIPIDEMIA: ICD-10-CM

## 2023-12-18 DIAGNOSIS — F41.9 ANXIETY: ICD-10-CM

## 2023-12-18 DIAGNOSIS — G89.29 CHRONIC BILATERAL LOW BACK PAIN WITHOUT SCIATICA: ICD-10-CM

## 2023-12-18 DIAGNOSIS — J45.40 MODERATE PERSISTENT ASTHMA, UNSPECIFIED WHETHER COMPLICATED: ICD-10-CM

## 2023-12-18 DIAGNOSIS — F17.200 TOBACCO USE DISORDER: ICD-10-CM

## 2023-12-18 DIAGNOSIS — M54.50 CHRONIC BILATERAL LOW BACK PAIN WITHOUT SCIATICA: ICD-10-CM

## 2023-12-18 DIAGNOSIS — F33.41 RECURRENT MAJOR DEPRESSIVE DISORDER, IN PARTIAL REMISSION (HCC): ICD-10-CM

## 2023-12-18 DIAGNOSIS — G62.9 NEUROPATHY: ICD-10-CM

## 2023-12-18 DIAGNOSIS — M54.2 CHRONIC NECK AND BACK PAIN: Chronic | ICD-10-CM

## 2023-12-18 DIAGNOSIS — M54.9 CHRONIC NECK AND BACK PAIN: Chronic | ICD-10-CM

## 2023-12-18 DIAGNOSIS — G89.29 CHRONIC NECK AND BACK PAIN: Chronic | ICD-10-CM

## 2023-12-18 PROCEDURE — 99214 OFFICE O/P EST MOD 30 MIN: CPT | Performed by: FAMILY MEDICINE

## 2023-12-18 RX ORDER — HYDROXYZINE PAMOATE 50 MG/1
50 CAPSULE ORAL 3 TIMES DAILY PRN
Qty: 30 CAPSULE | Refills: 5 | Status: SHIPPED | OUTPATIENT
Start: 2023-12-18

## 2023-12-18 RX ORDER — QUETIAPINE FUMARATE 100 MG/1
100 TABLET, FILM COATED ORAL
Qty: 90 TABLET | Refills: 1 | Status: SHIPPED | OUTPATIENT
Start: 2023-12-18

## 2023-12-18 RX ORDER — HYDROCODONE BITARTRATE AND ACETAMINOPHEN 7.5; 325 MG/1; MG/1
1 TABLET ORAL EVERY 6 HOURS PRN
Qty: 90 TABLET | Refills: 0 | Status: SHIPPED | OUTPATIENT
Start: 2023-12-18

## 2023-12-18 NOTE — PROGRESS NOTES
Assessment/Plan: Anxiety is treated with hydroxyzine 50 mg 3 times daily    Depression is treated with Remeron and Seroquel    Severe COPD with asthmatic component the patient uses Trelegy as a maintenance inhaler and albuterol as a rescue inhaler.  The patient does continue to smoke she is down to 1 pack a day with a nicotine patch.  The patient had been a 2 pack a day smoker    Chronic neck and low back pain hydrocodone improves function the PDMP has been reviewed no issues found no evidence of diversion's or abuse    Peripheral neuropathy treated with B6 50 mg daily  Problem List Items Addressed This Visit     Chronic bilateral low back pain without sciatica    Tobacco use disorder    Mixed hyperlipidemia    Relevant Orders    CBC and differential    Comprehensive metabolic panel    Lipid Panel with Direct LDL reflex    TSH, 3rd generation   Other Visit Diagnoses     Chronic obstructive pulmonary disease, unspecified COPD type (HCC)    -  Primary    Anxiety        Relevant Medications    hydrOXYzine pamoate (VISTARIL) 50 mg capsule    Other Relevant Orders    TSH, 3rd generation    Moderate persistent asthma, unspecified whether complicated        Recurrent major depressive disorder, in partial remission (HCC)        Relevant Medications    hydrOXYzine pamoate (VISTARIL) 50 mg capsule    QUEtiapine (SEROquel) 100 mg tablet    Chronic neck and back pain  (Chronic)       Relevant Medications    HYDROcodone-acetaminophen (NORCO) 7.5-325 mg per tablet    Neuropathy        Vitamin D deficiency        Relevant Orders    Vitamin D 25 hydroxy           Diagnoses and all orders for this visit:    Chronic obstructive pulmonary disease, unspecified COPD type (HCC)    Anxiety  -     hydrOXYzine pamoate (VISTARIL) 50 mg capsule; Take 1 capsule (50 mg total) by mouth 3 (three) times a day as needed for itching  -     TSH, 3rd generation; Future    Moderate persistent asthma, unspecified whether complicated    Tobacco use  disorder    Recurrent major depressive disorder, in partial remission (HCC)  -     QUEtiapine (SEROquel) 100 mg tablet; Take 1 tablet (100 mg total) by mouth daily at bedtime    Chronic bilateral low back pain without sciatica    Chronic neck and back pain  -     HYDROcodone-acetaminophen (NORCO) 7.5-325 mg per tablet; Take 1 tablet by mouth every 6 (six) hours as needed for pain Max Daily Amount: 3 tablets    Neuropathy    Mixed hyperlipidemia  -     CBC and differential; Future  -     Comprehensive metabolic panel; Future  -     Lipid Panel with Direct LDL reflex; Future  -     TSH, 3rd generation; Future    Vitamin D deficiency  -     Vitamin D 25 hydroxy; Future        No problem-specific Assessment & Plan notes found for this encounter.      PHQ-2/9 Depression Screening    Little interest or pleasure in doing things: 0 - not at all  Feeling down, depressed, or hopeless: 0 - not at all  PHQ-2 Score: 0  PHQ-2 Interpretation: Negative depression screen          Body mass index is 24.69 kg/m².    BMI Counseling: Body mass index is 24.69 kg/m². The BMI     Subjective:      Patient ID: Ana Hernandez is a 53 y.o. female.    Patient presents for recheck on chronic neck and low back pain        The following portions of the patient's history were reviewed and updated as appropriate:   She has a past medical history of Allergies, Anxiety, Asthma, Bereavement, Bipolar disorder ([facility]), Carpal tunnel syndrome, bilateral, Chronic bronchitis ([facility]), Chronic constipation, Chronic low back pain, Chronic neck pain, COPD (chronic obstructive pulmonary disease) ([facility]), Costochondritis, DDD (degenerative disc disease), cervical, Degenerative joint disease of left hip, Depression, Diverticulitis, DJD (degenerative joint disease) of knee, DJD (degenerative joint disease), ankle and foot, Edema, Fatigue, GABE (generalized anxiety disorder), GERD (gastroesophageal reflux disease), Hernia, inguinal, Hernia, umbilical, Hyperlipidemia,  Hypertension, IBS (irritable bowel syndrome), Insomnia, Lower GI bleed, Migraine, Neuropathy, PTSD (post-traumatic stress disorder), Raynaud disease, Scalp psoriasis, Situational depression, and Tobacco dependence.,  does not have any pertinent problems on file.,   has a past surgical history that includes Chest tube insertion; DXA procedure(historical) (09/04/2009 & 8/24/2005); Mammo (historical) (02/14/2014); Hernia repair; and Cryotherapy.,  family history includes Anxiety disorder in her other; Arthritis in her other; Cancer in her other; Depression in her other; Diabetes in her other; Heart disease in her other; No Known Problems in her maternal uncle, paternal uncle, and paternal uncle; Pulmonary fibrosis in her maternal aunt; Rheum arthritis in her maternal aunt; Stomach cancer in her father.,   reports that she has been smoking cigarettes. She started smoking about 41 years ago. She has a 83.9 pack-year smoking history. She has been exposed to tobacco smoke. She has never used smokeless tobacco. She reports current alcohol use. She reports that she does not currently use drugs.,  is allergic to erythromycin..  Current Outpatient Medications   Medication Sig Dispense Refill   • HYDROcodone-acetaminophen (NORCO) 7.5-325 mg per tablet Take 1 tablet by mouth every 6 (six) hours as needed for pain Max Daily Amount: 3 tablets 90 tablet 0   • hydrOXYzine pamoate (VISTARIL) 50 mg capsule Take 1 capsule (50 mg total) by mouth 3 (three) times a day as needed for itching 30 capsule 5   • QUEtiapine (SEROquel) 100 mg tablet Take 1 tablet (100 mg total) by mouth daily at bedtime 90 tablet 1   • albuterol (2.5 mg/3 mL) 0.083 % nebulizer solution Take 3 mL (2.5 mg total) by nebulization every 6 (six) hours as needed for wheezing or shortness of breath 180 mL 5   • atorvastatin (LIPITOR) 10 mg tablet Take 1 tablet (10 mg total) by mouth daily 90 tablet 1   • baclofen 10 mg tablet Take 1 tablet (10 mg total) by mouth 3  (three) times a day (Patient not taking: Reported on 12/18/2023) 90 tablet 4   • benzonatate (TESSALON) 200 MG capsule Take 1 capsule (200 mg total) by mouth 3 (three) times a day as needed for cough 30 capsule 2   • Calcium Carbonate 1500 (600 Ca) MG TABS Take one tablet by mouth twice a day. 180 tablet 1   • diclofenac (VOLTAREN) 75 mg EC tablet Take 1 tablet (75 mg total) by mouth 2 (two) times a day 60 tablet 5   • fluticasone-umeclidinium-vilanterol (Trelegy Ellipta) 200-62.5-25 mcg/actuation AEPB inhaler Inhale 1 puff daily Rinse mouth after use. 60 blister 5   • Ketotifen Fumarate (ZADITOR) 0.035 % ophthalmic solution Administer 1 drop to both eyes 2 (two) times a day 5 mL 5   • loratadine (CLARITIN) 10 mg tablet Take 1 tablet (10 mg total) by mouth daily 90 tablet 1   • methylPREDNISolone 4 MG tablet therapy pack Use as directed on package (Patient not taking: Reported on 12/18/2023) 21 each 0   • mirtazapine (REMERON) 45 MG tablet Take 1 tablet (45 mg total) by mouth daily at bedtime 30 tablet 2   • montelukast (SINGULAIR) 10 mg tablet Take 1 tablet (10 mg total) by mouth daily at bedtime 90 tablet 3   • Multiple Vitamins-Minerals (MULTIVITAMIN ADULT PO) Take by mouth     • nicotine (NICODERM CQ) 21 mg/24 hr TD 24 hr patch apply 1 patch to CLEAN, DRY, AND INTACT SKIN REMOVE AND REPLACE every 24 hours 28 patch 11   • predniSONE 10 mg tablet Take 4 tablets (40 mg total) by mouth 2 (two) times a day with meals 40 tablet 1   • promethazine-dextromethorphan (PHENERGAN-DM) 6.25-15 mg/5 mL oral syrup Take 5 mL by mouth 4 (four) times a day as needed for cough (Patient not taking: Reported on 11/20/2023) 180 mL 1   • pyridoxine (VITAMIN B6) 50 mg tablet Take 1 tablet (50 mg total) by mouth daily 90 tablet 1   • topiramate (TOPAMAX) 50 MG tablet Take 50 mg by mouth 2 (two) times a day (Patient not taking: Reported on 3/11/2022 )     • triamcinolone (KENALOG) 0.1 % cream Apply topically 2 (two) times a day 30 g 0   •  "TURMERIC PO Take 1 tablet by mouth       No current facility-administered medications for this visit.       Review of Systems   Constitutional:  Negative for chills and fever.   HENT:  Negative for ear pain and sore throat.    Eyes:  Negative for pain and visual disturbance.   Respiratory:  Positive for wheezing. Negative for cough and shortness of breath.    Cardiovascular:  Negative for chest pain and palpitations.   Gastrointestinal:  Negative for abdominal pain and vomiting.   Genitourinary:  Negative for dysuria and hematuria.   Musculoskeletal:  Positive for back pain, myalgias and neck stiffness. Negative for arthralgias.   Skin:  Negative for color change and rash.   Neurological:  Negative for seizures and syncope.   Psychiatric/Behavioral:  Positive for dysphoric mood. The patient is nervous/anxious.    All other systems reviewed and are negative.        Objective:    /76   Pulse 84   Temp 97.7 °F (36.5 °C)   Resp 20   Ht 5' 2\" (1.575 m)   Wt 61.2 kg (135 lb)   BMI 24.69 kg/m²   Body mass index is 24.69 kg/m².     Physical Exam  Constitutional:       Appearance: Normal appearance. She is well-developed.   HENT:      Head: Normocephalic and atraumatic.      Right Ear: Tympanic membrane, ear canal and external ear normal.      Left Ear: Tympanic membrane, ear canal and external ear normal.      Nose: Nose normal.      Mouth/Throat:      Mouth: Mucous membranes are moist.      Pharynx: Oropharynx is clear.   Eyes:      Extraocular Movements: Extraocular movements intact.      Conjunctiva/sclera: Conjunctivae normal.      Pupils: Pupils are equal, round, and reactive to light.   Cardiovascular:      Rate and Rhythm: Normal rate and regular rhythm.      Pulses: Normal pulses.      Heart sounds: Normal heart sounds.   Pulmonary:      Effort: Pulmonary effort is normal.      Breath sounds: Normal breath sounds.   Abdominal:      General: Abdomen is flat. Bowel sounds are normal.      Palpations: " Abdomen is soft.      Tenderness: There is no abdominal tenderness.   Musculoskeletal:         General: Normal range of motion.      Cervical back: Normal range of motion and neck supple.      Comments: Decreased range of motion of the cervical spine with multiple trigger points bilateral SI tenderness with paraspinal spasm   Skin:     General: Skin is warm and dry.      Capillary Refill: Capillary refill takes less than 2 seconds.   Neurological:      General: No focal deficit present.      Mental Status: She is alert and oriented to person, place, and time.   Psychiatric:         Mood and Affect: Mood normal.         Behavior: Behavior normal.         Thought Content: Thought content normal.         Judgment: Judgment normal.

## 2024-01-23 ENCOUNTER — OFFICE VISIT (OUTPATIENT)
Dept: FAMILY MEDICINE CLINIC | Facility: CLINIC | Age: 54
End: 2024-01-23
Payer: COMMERCIAL

## 2024-01-23 VITALS
RESPIRATION RATE: 20 BRPM | HEART RATE: 84 BPM | HEIGHT: 62 IN | WEIGHT: 133 LBS | TEMPERATURE: 97.5 F | DIASTOLIC BLOOD PRESSURE: 76 MMHG | SYSTOLIC BLOOD PRESSURE: 130 MMHG | BODY MASS INDEX: 24.48 KG/M2

## 2024-01-23 DIAGNOSIS — G89.29 CHRONIC BILATERAL LOW BACK PAIN WITHOUT SCIATICA: ICD-10-CM

## 2024-01-23 DIAGNOSIS — M54.50 CHRONIC BILATERAL LOW BACK PAIN WITHOUT SCIATICA: ICD-10-CM

## 2024-01-23 DIAGNOSIS — Z88.9 HISTORY OF SEASONAL ALLERGIES: ICD-10-CM

## 2024-01-23 DIAGNOSIS — G89.29 CHRONIC NECK AND BACK PAIN: Chronic | ICD-10-CM

## 2024-01-23 DIAGNOSIS — G62.9 NEUROPATHY: ICD-10-CM

## 2024-01-23 DIAGNOSIS — J30.89 ENVIRONMENTAL AND SEASONAL ALLERGIES: ICD-10-CM

## 2024-01-23 DIAGNOSIS — M54.2 CHRONIC NECK AND BACK PAIN: Chronic | ICD-10-CM

## 2024-01-23 DIAGNOSIS — M54.9 CHRONIC NECK AND BACK PAIN: Chronic | ICD-10-CM

## 2024-01-23 DIAGNOSIS — J44.9 CHRONIC OBSTRUCTIVE PULMONARY DISEASE, UNSPECIFIED COPD TYPE (HCC): ICD-10-CM

## 2024-01-23 DIAGNOSIS — F17.200 TOBACCO USE DISORDER: ICD-10-CM

## 2024-01-23 DIAGNOSIS — F33.41 RECURRENT MAJOR DEPRESSIVE DISORDER, IN PARTIAL REMISSION (HCC): Primary | ICD-10-CM

## 2024-01-23 PROCEDURE — 99214 OFFICE O/P EST MOD 30 MIN: CPT | Performed by: FAMILY MEDICINE

## 2024-01-23 RX ORDER — MONTELUKAST SODIUM 10 MG/1
10 TABLET ORAL
Qty: 90 TABLET | Refills: 1 | Status: SHIPPED | OUTPATIENT
Start: 2024-01-23

## 2024-01-23 RX ORDER — MIRTAZAPINE 45 MG/1
45 TABLET, FILM COATED ORAL
Qty: 30 TABLET | Refills: 2 | Status: SHIPPED | OUTPATIENT
Start: 2024-01-23

## 2024-01-23 RX ORDER — BENZONATATE 200 MG/1
200 CAPSULE ORAL 3 TIMES DAILY PRN
Qty: 30 CAPSULE | Refills: 2 | Status: SHIPPED | OUTPATIENT
Start: 2024-01-23

## 2024-01-23 RX ORDER — LORATADINE 10 MG/1
10 TABLET ORAL DAILY
Qty: 90 TABLET | Refills: 1 | Status: SHIPPED | OUTPATIENT
Start: 2024-01-23

## 2024-01-23 RX ORDER — HYDROCODONE BITARTRATE AND ACETAMINOPHEN 7.5; 325 MG/1; MG/1
1 TABLET ORAL EVERY 6 HOURS PRN
Qty: 90 TABLET | Refills: 0 | Status: SHIPPED | OUTPATIENT
Start: 2024-01-23

## 2024-01-23 NOTE — PROGRESS NOTES
Assessment/Plan: Depressive symptoms improved with Remeron and Seroquel    Environmental and seasonal allergies Singulair is effective therapy    Severe COPD with asthmatic component the patient uses Trelegy as a maintenance inhaler and albuterol as a rescue inhaler.  The patient does continue to smoke she is down to 1 pack/day with use of nicotine patch.  The patient has been a 2 pack a day smoker in the past    Chronic neck and low back pain hydrocodone improves function the PDMP has been reviewed no issues found no evidence of diversion's or abuse    Peripheral neuropathy is treated with vitamin B6 daily    Problem List Items Addressed This Visit       Chronic bilateral low back pain without sciatica    Tobacco use disorder     Other Visit Diagnoses       Recurrent major depressive disorder, in partial remission (HCC)    -  Primary    Relevant Medications    mirtazapine (REMERON) 45 MG tablet    History of seasonal allergies        Relevant Medications    loratadine (CLARITIN) 10 mg tablet    Environmental and seasonal allergies        Relevant Medications    montelukast (SINGULAIR) 10 mg tablet    Chronic obstructive pulmonary disease, unspecified COPD type (HCC)        Relevant Medications    montelukast (SINGULAIR) 10 mg tablet    benzonatate (TESSALON) 200 MG capsule    loratadine (CLARITIN) 10 mg tablet    Chronic neck and back pain  (Chronic)       Relevant Medications    HYDROcodone-acetaminophen (NORCO) 7.5-325 mg per tablet    Neuropathy                 Diagnoses and all orders for this visit:    Recurrent major depressive disorder, in partial remission (HCC)  -     mirtazapine (REMERON) 45 MG tablet; Take 1 tablet (45 mg total) by mouth daily at bedtime    History of seasonal allergies  -     loratadine (CLARITIN) 10 mg tablet; Take 1 tablet (10 mg total) by mouth daily    Environmental and seasonal allergies  -     montelukast (SINGULAIR) 10 mg tablet; Take 1 tablet (10 mg total) by mouth daily at  bedtime    Chronic obstructive pulmonary disease, unspecified COPD type ([facility])  -     benzonatate (TESSALON) 200 MG capsule; Take 1 capsule (200 mg total) by mouth 3 (three) times a day as needed for cough    Chronic neck and back pain  -     HYDROcodone-acetaminophen (NORCO) 7.5-325 mg per tablet; Take 1 tablet by mouth every 6 (six) hours as needed for pain Max Daily Amount: 3 tablets    Chronic bilateral low back pain without sciatica    Tobacco use disorder    Neuropathy        No problem-specific Assessment & Plan notes found for this encounter.      PHQ-2/9 Depression Screening              Body mass index is 24.33 kg/m².    BMI Counseling: Body mass index is 24.33 kg/m². The BMI     Subjective:      Patient ID: Ana Hernandez is a 53 y.o. female.    Patient presents for checkup on depression and chronic neck and low back pain.        The following portions of the patient's history were reviewed and updated as appropriate:   She has a past medical history of Allergies, Anxiety, Asthma, Bereavement, Bipolar disorder ([facility]), Carpal tunnel syndrome, bilateral, Chronic bronchitis ([facility]), Chronic constipation, Chronic low back pain, Chronic neck pain, COPD (chronic obstructive pulmonary disease) ([facility]), Costochondritis, DDD (degenerative disc disease), cervical, Degenerative joint disease of left hip, Depression, Diverticulitis, DJD (degenerative joint disease) of knee, DJD (degenerative joint disease), ankle and foot, Edema, Fatigue, GABE (generalized anxiety disorder), GERD (gastroesophageal reflux disease), Hernia, inguinal, Hernia, umbilical, Hyperlipidemia, Hypertension, IBS (irritable bowel syndrome), Insomnia, Lower GI bleed, Migraine, Neuropathy, PTSD (post-traumatic stress disorder), Raynaud disease, Scalp psoriasis, Situational depression, and Tobacco dependence.,  does not have any pertinent problems on file.,   has a past surgical history that includes Chest tube insertion; DXA procedure(historical)  (09/04/2009 & 8/24/2005); Mammo (historical) (02/14/2014); Hernia repair; and Cryotherapy.,  family history includes Anxiety disorder in her other; Arthritis in her other; Cancer in her other; Depression in her other; Diabetes in her other; Heart disease in her other; No Known Problems in her maternal uncle, paternal uncle, and paternal uncle; Pulmonary fibrosis in her maternal aunt; Rheum arthritis in her maternal aunt; Stomach cancer in her father.,   reports that she has been smoking cigarettes. She started smoking about 42 years ago. She has a 84.1 pack-year smoking history. She has been exposed to tobacco smoke. She has never used smokeless tobacco. She reports current alcohol use. She reports that she does not currently use drugs.,  is allergic to erythromycin..  Current Outpatient Medications   Medication Sig Dispense Refill    benzonatate (TESSALON) 200 MG capsule Take 1 capsule (200 mg total) by mouth 3 (three) times a day as needed for cough 30 capsule 2    HYDROcodone-acetaminophen (NORCO) 7.5-325 mg per tablet Take 1 tablet by mouth every 6 (six) hours as needed for pain Max Daily Amount: 3 tablets 90 tablet 0    loratadine (CLARITIN) 10 mg tablet Take 1 tablet (10 mg total) by mouth daily 90 tablet 1    mirtazapine (REMERON) 45 MG tablet Take 1 tablet (45 mg total) by mouth daily at bedtime 30 tablet 2    montelukast (SINGULAIR) 10 mg tablet Take 1 tablet (10 mg total) by mouth daily at bedtime 90 tablet 1    albuterol (2.5 mg/3 mL) 0.083 % nebulizer solution Take 3 mL (2.5 mg total) by nebulization every 6 (six) hours as needed for wheezing or shortness of breath 180 mL 5    atorvastatin (LIPITOR) 10 mg tablet Take 1 tablet (10 mg total) by mouth daily 90 tablet 1    baclofen 10 mg tablet Take 1 tablet (10 mg total) by mouth 3 (three) times a day (Patient not taking: Reported on 12/18/2023) 90 tablet 4    Calcium Carbonate 1500 (600 Ca) MG TABS Take one tablet by mouth twice a day. 180 tablet 1     diclofenac (VOLTAREN) 75 mg EC tablet Take 1 tablet (75 mg total) by mouth 2 (two) times a day 60 tablet 5    fluticasone-umeclidinium-vilanterol (Trelegy Ellipta) 200-62.5-25 mcg/actuation AEPB inhaler Inhale 1 puff daily Rinse mouth after use. 60 blister 5    hydrOXYzine pamoate (VISTARIL) 50 mg capsule Take 1 capsule (50 mg total) by mouth 3 (three) times a day as needed for itching 30 capsule 5    Ketotifen Fumarate (ZADITOR) 0.035 % ophthalmic solution Administer 1 drop to both eyes 2 (two) times a day 5 mL 5    methylPREDNISolone 4 MG tablet therapy pack Use as directed on package (Patient not taking: Reported on 12/18/2023) 21 each 0    Multiple Vitamins-Minerals (MULTIVITAMIN ADULT PO) Take by mouth      nicotine (NICODERM CQ) 21 mg/24 hr TD 24 hr patch apply 1 patch to CLEAN, DRY, AND INTACT SKIN REMOVE AND REPLACE every 24 hours 28 patch 11    predniSONE 10 mg tablet Take 4 tablets (40 mg total) by mouth 2 (two) times a day with meals 40 tablet 1    promethazine-dextromethorphan (PHENERGAN-DM) 6.25-15 mg/5 mL oral syrup Take 5 mL by mouth 4 (four) times a day as needed for cough (Patient not taking: Reported on 11/20/2023) 180 mL 1    pyridoxine (VITAMIN B6) 50 mg tablet Take 1 tablet (50 mg total) by mouth daily 90 tablet 1    QUEtiapine (SEROquel) 100 mg tablet Take 1 tablet (100 mg total) by mouth daily at bedtime 90 tablet 1    topiramate (TOPAMAX) 50 MG tablet Take 50 mg by mouth 2 (two) times a day (Patient not taking: Reported on 3/11/2022 )      triamcinolone (KENALOG) 0.1 % cream Apply topically 2 (two) times a day 30 g 0    TURMERIC PO Take 1 tablet by mouth       No current facility-administered medications for this visit.       Review of Systems   Constitutional:  Negative for chills and fever.   HENT:  Negative for ear pain and sore throat.    Eyes:  Negative for pain and visual disturbance.   Respiratory:  Negative for cough and shortness of breath.    Cardiovascular:  Negative for chest pain  "and palpitations.   Gastrointestinal:  Negative for abdominal pain and vomiting.   Genitourinary:  Negative for dysuria and hematuria.   Musculoskeletal:  Positive for back pain and neck pain. Negative for arthralgias.   Skin:  Negative for color change and rash.   Neurological:  Negative for seizures and syncope.   All other systems reviewed and are negative.        Objective:    /76   Pulse 84   Temp 97.5 °F (36.4 °C)   Resp 20   Ht 5' 2\" (1.575 m)   Wt 60.3 kg (133 lb)   BMI 24.33 kg/m²   Body mass index is 24.33 kg/m².     Physical Exam  Constitutional:       Appearance: Normal appearance. She is well-developed.   HENT:      Head: Normocephalic and atraumatic.      Right Ear: Tympanic membrane, ear canal and external ear normal.      Left Ear: Tympanic membrane, ear canal and external ear normal.      Nose: Nose normal.      Mouth/Throat:      Mouth: Mucous membranes are moist.      Pharynx: Oropharynx is clear.   Eyes:      Extraocular Movements: Extraocular movements intact.      Conjunctiva/sclera: Conjunctivae normal.      Pupils: Pupils are equal, round, and reactive to light.   Cardiovascular:      Rate and Rhythm: Normal rate and regular rhythm.      Pulses: Normal pulses.      Heart sounds: Normal heart sounds.   Pulmonary:      Effort: Pulmonary effort is normal.      Breath sounds: Normal breath sounds.   Abdominal:      General: Abdomen is flat. Bowel sounds are normal.      Palpations: Abdomen is soft.      Tenderness: There is no abdominal tenderness.   Musculoskeletal:         General: Normal range of motion.      Cervical back: Normal range of motion and neck supple.      Comments: Decreased range of motion of the cervical spine with multiple trigger points lumbar paraspinal spasm with bilateral SI tenderness   Skin:     General: Skin is warm and dry.      Capillary Refill: Capillary refill takes less than 2 seconds.   Neurological:      General: No focal deficit present.      Mental " Status: She is alert and oriented to person, place, and time.   Psychiatric:         Mood and Affect: Mood normal.         Behavior: Behavior normal.         Thought Content: Thought content normal.         Judgment: Judgment normal.

## 2024-02-05 DIAGNOSIS — M54.9 CHRONIC NECK AND BACK PAIN: Primary | ICD-10-CM

## 2024-02-05 DIAGNOSIS — M54.2 CHRONIC NECK AND BACK PAIN: Primary | ICD-10-CM

## 2024-02-05 DIAGNOSIS — G89.29 CHRONIC NECK AND BACK PAIN: Primary | ICD-10-CM

## 2024-02-05 RX ORDER — METHOCARBAMOL 750 MG/1
750 TABLET, FILM COATED ORAL 4 TIMES DAILY PRN
Qty: 120 TABLET | Refills: 2 | Status: SHIPPED | OUTPATIENT
Start: 2024-02-05

## 2024-02-05 NOTE — TELEPHONE ENCOUNTER
Patient called; attempted to get a refill on methocarbamol but Rx was discontinued at Pharmacy when patient was given baclofen on 11/20/2023 for her neck pain. Patient states prefers the methocarbamol but needs a new Rx sent to [facility] [place]

## 2024-02-26 ENCOUNTER — OFFICE VISIT (OUTPATIENT)
Dept: FAMILY MEDICINE CLINIC | Facility: CLINIC | Age: 54
End: 2024-02-26
Payer: COMMERCIAL

## 2024-02-26 VITALS
TEMPERATURE: 97.5 F | WEIGHT: 133 LBS | BODY MASS INDEX: 24.48 KG/M2 | DIASTOLIC BLOOD PRESSURE: 84 MMHG | SYSTOLIC BLOOD PRESSURE: 124 MMHG | HEART RATE: 84 BPM | HEIGHT: 62 IN | RESPIRATION RATE: 20 BRPM

## 2024-02-26 DIAGNOSIS — G89.29 CHRONIC BILATERAL LOW BACK PAIN WITHOUT SCIATICA: ICD-10-CM

## 2024-02-26 DIAGNOSIS — G62.9 NEUROPATHY: ICD-10-CM

## 2024-02-26 DIAGNOSIS — M54.50 CHRONIC BILATERAL LOW BACK PAIN WITHOUT SCIATICA: ICD-10-CM

## 2024-02-26 DIAGNOSIS — M54.2 CHRONIC NECK AND BACK PAIN: Primary | Chronic | ICD-10-CM

## 2024-02-26 DIAGNOSIS — J44.9 CHRONIC OBSTRUCTIVE PULMONARY DISEASE, UNSPECIFIED COPD TYPE (HCC): ICD-10-CM

## 2024-02-26 DIAGNOSIS — Z79.891 OPIOID USE AGREEMENT EXISTS: ICD-10-CM

## 2024-02-26 DIAGNOSIS — G89.29 CHRONIC NECK AND BACK PAIN: Primary | Chronic | ICD-10-CM

## 2024-02-26 DIAGNOSIS — F11.20 OPIOID TYPE DEPENDENCE, CONTINUOUS (HCC): ICD-10-CM

## 2024-02-26 DIAGNOSIS — M54.9 CHRONIC NECK AND BACK PAIN: Primary | Chronic | ICD-10-CM

## 2024-02-26 DIAGNOSIS — Z88.9 HISTORY OF SEASONAL ALLERGIES: ICD-10-CM

## 2024-02-26 DIAGNOSIS — F33.41 RECURRENT MAJOR DEPRESSIVE DISORDER, IN PARTIAL REMISSION (HCC): ICD-10-CM

## 2024-02-26 PROCEDURE — 99214 OFFICE O/P EST MOD 30 MIN: CPT | Performed by: FAMILY MEDICINE

## 2024-02-26 RX ORDER — QUETIAPINE FUMARATE 100 MG/1
100 TABLET, FILM COATED ORAL
Qty: 90 TABLET | Refills: 1 | Status: SHIPPED | OUTPATIENT
Start: 2024-02-26

## 2024-02-26 RX ORDER — LANOLIN ALCOHOL/MO/W.PET/CERES
50 CREAM (GRAM) TOPICAL DAILY
Qty: 90 TABLET | Refills: 1 | Status: SHIPPED | OUTPATIENT
Start: 2024-02-26

## 2024-02-26 RX ORDER — LORATADINE 10 MG/1
10 TABLET ORAL DAILY
Qty: 90 TABLET | Refills: 1 | Status: SHIPPED | OUTPATIENT
Start: 2024-02-26

## 2024-02-26 RX ORDER — HYDROCODONE BITARTRATE AND ACETAMINOPHEN 7.5; 325 MG/1; MG/1
1 TABLET ORAL EVERY 6 HOURS PRN
Qty: 90 TABLET | Refills: 0 | Status: SHIPPED | OUTPATIENT
Start: 2024-02-26

## 2024-02-26 NOTE — PROGRESS NOTES
Assessment/Plan: Chronic bilateral low back pain the PDMP has been reviewed no issues found no evidence of divergence or abuse    Recurrent major depression in partial remission treated with Seroquel and Remeron    Chronic obstructive pulmonary disease patient uses Trelegy as a maintenance inhaler albuterol as a rescue inhaler    Bilateral neuropathy treated with B6 50 mg daily    Problem List Items Addressed This Visit     Chronic bilateral low back pain without sciatica    Relevant Medications    Calcium Carbonate 1500 (600 Ca) MG TABS    Opioid use agreement exists    Relevant Orders    Drug Screen Routine w /Conf and Adulteration, urine.   Other Visit Diagnoses     Chronic neck and back pain  (Chronic)   -  Primary    Relevant Medications    HYDROcodone-acetaminophen (NORCO) 7.5-325 mg per tablet    Opioid type dependence, continuous (HCC)        Relevant Orders    Drug Screen Routine w /Conf and Adulteration, urine.    Recurrent major depressive disorder, in partial remission (HCC)        Relevant Medications    QUEtiapine (SEROquel) 100 mg tablet    History of seasonal allergies        Relevant Medications    loratadine (CLARITIN) 10 mg tablet    Chronic obstructive pulmonary disease, unspecified COPD type (HCC)        Relevant Medications    loratadine (CLARITIN) 10 mg tablet    fluticasone-umeclidinium-vilanterol (Trelegy Ellipta) 200-62.5-25 mcg/actuation AEPB inhaler    Neuropathy        Relevant Medications    pyridoxine (VITAMIN B6) 50 mg tablet           Diagnoses and all orders for this visit:    Chronic neck and back pain  -     HYDROcodone-acetaminophen (NORCO) 7.5-325 mg per tablet; Take 1 tablet by mouth every 6 (six) hours as needed for pain Max Daily Amount: 3 tablets    Chronic bilateral low back pain without sciatica  -     Calcium Carbonate 1500 (600 Ca) MG TABS; Take one tablet by mouth twice a day.    Opioid type dependence, continuous (HCC)  -     Drug Screen Routine w /Conf and  Adulteration, urine.; Future    Opioid use agreement exists  -     Drug Screen Routine w /Conf and Adulteration, urine.; Future    Recurrent major depressive disorder, in partial remission (HCC)  -     QUEtiapine (SEROquel) 100 mg tablet; Take 1 tablet (100 mg total) by mouth daily at bedtime    History of seasonal allergies  -     loratadine (CLARITIN) 10 mg tablet; Take 1 tablet (10 mg total) by mouth daily    Chronic obstructive pulmonary disease, unspecified COPD type ([facility])  -     fluticasone-umeclidinium-vilanterol (Trelegy Ellipta) 200-62.5-25 mcg/actuation AEPB inhaler; Inhale 1 puff daily Rinse mouth after use.    Neuropathy  -     pyridoxine (VITAMIN B6) 50 mg tablet; Take 1 tablet (50 mg total) by mouth daily        No problem-specific Assessment & Plan notes found for this encounter.      PHQ-2/9 Depression Screening            Body mass index is 24.33 kg/m².    BMI Counseling: Body mass index is 24.33 kg/m². The BMI     Subjective:      Patient ID: Ana Hernandez is a 53 y.o. female.    Patient presents for checkup on chronic back pain, depression, and COPD        The following portions of the patient's history were reviewed and updated as appropriate:   She has a past medical history of Allergies, Anxiety, Asthma, Bereavement, Bipolar disorder ([facility]), Carpal tunnel syndrome, bilateral, Chronic bronchitis ([facility]), Chronic constipation, Chronic low back pain, Chronic neck pain, COPD (chronic obstructive pulmonary disease) ([facility]), Costochondritis, DDD (degenerative disc disease), cervical, Degenerative joint disease of left hip, Depression, Diverticulitis, DJD (degenerative joint disease) of knee, DJD (degenerative joint disease), ankle and foot, Edema, Fatigue, GABE (generalized anxiety disorder), GERD (gastroesophageal reflux disease), Hernia, inguinal, Hernia, umbilical, Hyperlipidemia, Hypertension, IBS (irritable bowel syndrome), Insomnia, Lower GI bleed, Migraine, Neuropathy, PTSD (post-traumatic stress  disorder), Raynaud disease, Scalp psoriasis, Situational depression, and Tobacco dependence.,  does not have any pertinent problems on file.,   has a past surgical history that includes Chest tube insertion; DXA procedure(historical) (09/04/2009 & 8/24/2005); Mammo (historical) (02/14/2014); Hernia repair; and Cryotherapy.,  family history includes Anxiety disorder in her other; Arthritis in her other; Cancer in her other; Depression in her other; Diabetes in her other; Heart disease in her other; No Known Problems in her maternal uncle, paternal uncle, and paternal uncle; Pulmonary fibrosis in her maternal aunt; Rheum arthritis in her maternal aunt; Stomach cancer in her father.,   reports that she has been smoking cigarettes. She started smoking about 42 years ago. She has a 84.3 pack-year smoking history. She has been exposed to tobacco smoke. She has never used smokeless tobacco. She reports current alcohol use. She reports that she does not currently use drugs.,  is allergic to erythromycin..  Current Outpatient Medications   Medication Sig Dispense Refill   • Calcium Carbonate 1500 (600 Ca) MG TABS Take one tablet by mouth twice a day. 180 tablet 1   • fluticasone-umeclidinium-vilanterol (Trelegy Ellipta) 200-62.5-25 mcg/actuation AEPB inhaler Inhale 1 puff daily Rinse mouth after use. 60 blister 5   • HYDROcodone-acetaminophen (NORCO) 7.5-325 mg per tablet Take 1 tablet by mouth every 6 (six) hours as needed for pain Max Daily Amount: 3 tablets 90 tablet 0   • loratadine (CLARITIN) 10 mg tablet Take 1 tablet (10 mg total) by mouth daily 90 tablet 1   • pyridoxine (VITAMIN B6) 50 mg tablet Take 1 tablet (50 mg total) by mouth daily 90 tablet 1   • QUEtiapine (SEROquel) 100 mg tablet Take 1 tablet (100 mg total) by mouth daily at bedtime 90 tablet 1   • albuterol (2.5 mg/3 mL) 0.083 % nebulizer solution Take 3 mL (2.5 mg total) by nebulization every 6 (six) hours as needed for wheezing or shortness of breath  180 mL 5   • atorvastatin (LIPITOR) 10 mg tablet Take 1 tablet (10 mg total) by mouth daily 90 tablet 1   • baclofen 10 mg tablet Take 1 tablet (10 mg total) by mouth 3 (three) times a day (Patient not taking: Reported on 12/18/2023) 90 tablet 4   • benzonatate (TESSALON) 200 MG capsule Take 1 capsule (200 mg total) by mouth 3 (three) times a day as needed for cough 30 capsule 2   • diclofenac (VOLTAREN) 75 mg EC tablet Take 1 tablet (75 mg total) by mouth 2 (two) times a day 60 tablet 5   • hydrOXYzine pamoate (VISTARIL) 50 mg capsule Take 1 capsule (50 mg total) by mouth 3 (three) times a day as needed for itching 30 capsule 5   • Ketotifen Fumarate (ZADITOR) 0.035 % ophthalmic solution Administer 1 drop to both eyes 2 (two) times a day 5 mL 5   • methocarbamol (Robaxin-750) 750 mg tablet Take 1 tablet (750 mg total) by mouth 4 (four) times a day as needed for muscle spasms 120 tablet 2   • methylPREDNISolone 4 MG tablet therapy pack Use as directed on package (Patient not taking: Reported on 12/18/2023) 21 each 0   • mirtazapine (REMERON) 45 MG tablet Take 1 tablet (45 mg total) by mouth daily at bedtime 30 tablet 2   • montelukast (SINGULAIR) 10 mg tablet Take 1 tablet (10 mg total) by mouth daily at bedtime 90 tablet 1   • Multiple Vitamins-Minerals (MULTIVITAMIN ADULT PO) Take by mouth     • nicotine (NICODERM CQ) 21 mg/24 hr TD 24 hr patch apply 1 patch to CLEAN, DRY, AND INTACT SKIN REMOVE AND REPLACE every 24 hours 28 patch 11   • predniSONE 10 mg tablet Take 4 tablets (40 mg total) by mouth 2 (two) times a day with meals 40 tablet 1   • promethazine-dextromethorphan (PHENERGAN-DM) 6.25-15 mg/5 mL oral syrup Take 5 mL by mouth 4 (four) times a day as needed for cough (Patient not taking: Reported on 11/20/2023) 180 mL 1   • topiramate (TOPAMAX) 50 MG tablet Take 50 mg by mouth 2 (two) times a day (Patient not taking: Reported on 3/11/2022 )     • triamcinolone (KENALOG) 0.1 % cream Apply topically 2 (two)  "times a day 30 g 0   • TURMERIC PO Take 1 tablet by mouth       No current facility-administered medications for this visit.       Review of Systems   Constitutional:  Negative for chills and fever.   HENT:  Negative for ear pain and sore throat.    Eyes:  Negative for pain and visual disturbance.   Respiratory:  Positive for cough and shortness of breath.    Cardiovascular:  Negative for chest pain and palpitations.   Gastrointestinal:  Negative for abdominal pain and vomiting.   Genitourinary:  Negative for dysuria and hematuria.   Musculoskeletal:  Negative for arthralgias and back pain.   Skin:  Negative for color change and rash.   Neurological:  Negative for seizures and syncope.   All other systems reviewed and are negative.        Objective:    /84   Pulse 84   Temp 97.5 °F (36.4 °C)   Resp 20   Ht 5' 2\" (1.575 m)   Wt 60.3 kg (133 lb)   BMI 24.33 kg/m²   Body mass index is 24.33 kg/m².     Physical Exam  Constitutional:       Appearance: Normal appearance. She is well-developed.   HENT:      Head: Normocephalic and atraumatic.      Right Ear: Tympanic membrane, ear canal and external ear normal.      Left Ear: Tympanic membrane, ear canal and external ear normal.      Nose: Nose normal.      Mouth/Throat:      Mouth: Mucous membranes are moist.      Pharynx: Oropharynx is clear.   Eyes:      Extraocular Movements: Extraocular movements intact.      Conjunctiva/sclera: Conjunctivae normal.      Pupils: Pupils are equal, round, and reactive to light.   Cardiovascular:      Rate and Rhythm: Normal rate and regular rhythm.      Pulses: Normal pulses.      Heart sounds: Normal heart sounds.   Pulmonary:      Effort: Pulmonary effort is normal.      Breath sounds: Normal breath sounds.   Abdominal:      General: Abdomen is flat. Bowel sounds are normal.      Palpations: Abdomen is soft.      Tenderness: There is no abdominal tenderness.   Musculoskeletal:         General: Normal range of motion.      " Cervical back: Normal range of motion and neck supple.   Skin:     General: Skin is warm and dry.      Capillary Refill: Capillary refill takes less than 2 seconds.   Neurological:      General: No focal deficit present.      Mental Status: She is alert and oriented to person, place, and time.   Psychiatric:         Mood and Affect: Mood normal.         Behavior: Behavior normal.         Thought Content: Thought content normal.         Judgment: Judgment normal.

## 2024-03-22 ENCOUNTER — APPOINTMENT (OUTPATIENT)
Dept: LAB | Facility: HOSPITAL | Age: 54
End: 2024-03-22
Payer: COMMERCIAL

## 2024-03-22 ENCOUNTER — APPOINTMENT (OUTPATIENT)
Dept: LAB | Facility: HOSPITAL | Age: 54
End: 2024-03-22
Attending: FAMILY MEDICINE
Payer: COMMERCIAL

## 2024-03-22 DIAGNOSIS — Z79.891 OPIOID USE AGREEMENT EXISTS: ICD-10-CM

## 2024-03-22 DIAGNOSIS — F11.20 OPIOID TYPE DEPENDENCE, CONTINUOUS (HCC): ICD-10-CM

## 2024-03-22 PROCEDURE — 83992 ASSAY FOR PHENCYCLIDINE: CPT

## 2024-03-22 PROCEDURE — 80361 OPIATES 1 OR MORE: CPT

## 2024-03-22 PROCEDURE — 80307 DRUG TEST PRSMV CHEM ANLYZR: CPT

## 2024-03-22 PROCEDURE — 80355 GABAPENTIN NON-BLOOD: CPT

## 2024-03-22 PROCEDURE — 80366 DRUG SCREENING PREGABALIN: CPT

## 2024-03-25 ENCOUNTER — OFFICE VISIT (OUTPATIENT)
Dept: FAMILY MEDICINE CLINIC | Facility: CLINIC | Age: 54
End: 2024-03-25
Payer: COMMERCIAL

## 2024-03-25 VITALS
WEIGHT: 134 LBS | SYSTOLIC BLOOD PRESSURE: 134 MMHG | RESPIRATION RATE: 16 BRPM | TEMPERATURE: 97.5 F | BODY MASS INDEX: 24.66 KG/M2 | DIASTOLIC BLOOD PRESSURE: 84 MMHG | HEART RATE: 76 BPM | HEIGHT: 62 IN

## 2024-03-25 DIAGNOSIS — G89.29 CHRONIC NECK AND BACK PAIN: Primary | Chronic | ICD-10-CM

## 2024-03-25 DIAGNOSIS — T78.40XD ALLERGY, SUBSEQUENT ENCOUNTER: ICD-10-CM

## 2024-03-25 DIAGNOSIS — M54.2 CHRONIC NECK AND BACK PAIN: Primary | Chronic | ICD-10-CM

## 2024-03-25 DIAGNOSIS — M54.9 CHRONIC NECK AND BACK PAIN: Primary | Chronic | ICD-10-CM

## 2024-03-25 DIAGNOSIS — M54.50 CHRONIC BILATERAL LOW BACK PAIN WITHOUT SCIATICA: ICD-10-CM

## 2024-03-25 DIAGNOSIS — F17.210 SMOKING GREATER THAN 20 PACK YEARS: ICD-10-CM

## 2024-03-25 DIAGNOSIS — J44.9 CHRONIC OBSTRUCTIVE PULMONARY DISEASE, UNSPECIFIED COPD TYPE (HCC): ICD-10-CM

## 2024-03-25 DIAGNOSIS — J30.89 ENVIRONMENTAL AND SEASONAL ALLERGIES: ICD-10-CM

## 2024-03-25 DIAGNOSIS — F11.20 OPIOID TYPE DEPENDENCE, CONTINUOUS (HCC): ICD-10-CM

## 2024-03-25 DIAGNOSIS — G89.29 CHRONIC BILATERAL LOW BACK PAIN WITHOUT SCIATICA: ICD-10-CM

## 2024-03-25 DIAGNOSIS — F17.200 TOBACCO USE DISORDER: ICD-10-CM

## 2024-03-25 PROCEDURE — 99214 OFFICE O/P EST MOD 30 MIN: CPT | Performed by: FAMILY MEDICINE

## 2024-03-25 RX ORDER — HYDROCODONE BITARTRATE AND ACETAMINOPHEN 7.5; 325 MG/1; MG/1
1 TABLET ORAL EVERY 6 HOURS PRN
Qty: 90 TABLET | Refills: 0 | Status: SHIPPED | OUTPATIENT
Start: 2024-03-25

## 2024-03-25 RX ORDER — METHYLPREDNISOLONE 4 MG/1
TABLET ORAL
Qty: 21 EACH | Refills: 0 | Status: SHIPPED | OUTPATIENT
Start: 2024-03-25

## 2024-03-25 NOTE — PROGRESS NOTES
Assessment/Plan: Allergies with and chest congestion we will add a Medrol Dosepak to the Claritin and Singulair    Chronic neck and low back pain the PDMP has been reviewed no issues found no evidence of divergence or abuse    Chronic obstructive pulmonary disease the patient uses Trelegy as a maintenance inhaler and albuterol as a rescue inhaler    The patient is agreeable to the lung cancer screening program and will obtain a low-dose CT of the chest    Tobacco use disorder the patient has 40+ pack year history of smoking has been a nicotine replacement failure.    Problem List Items Addressed This Visit     Chronic bilateral low back pain without sciatica    Tobacco use disorder   Other Visit Diagnoses     Chronic neck and back pain  (Chronic)   -  Primary    Relevant Medications    HYDROcodone-acetaminophen (NORCO) 7.5-325 mg per tablet    Opioid type dependence, continuous (HCC)        Smoking greater than 20 pack years        Relevant Orders    CT lung screening program    Allergy, subsequent encounter        Chronic obstructive pulmonary disease, unspecified COPD type (HCC)        Relevant Medications    methylPREDNISolone 4 MG tablet therapy pack    Environmental and seasonal allergies        Relevant Medications    methylPREDNISolone 4 MG tablet therapy pack           Diagnoses and all orders for this visit:    Chronic neck and back pain  -     HYDROcodone-acetaminophen (NORCO) 7.5-325 mg per tablet; Take 1 tablet by mouth every 6 (six) hours as needed for pain Max Daily Amount: 3 tablets    Chronic bilateral low back pain without sciatica    Opioid type dependence, continuous (HCC)    Smoking greater than 20 pack years  -     CT lung screening program; Future    Allergy, subsequent encounter    Chronic obstructive pulmonary disease, unspecified COPD type (HCC)    Environmental and seasonal allergies  -     methylPREDNISolone 4 MG tablet therapy pack; Use as directed on package    Tobacco use disorder         No problem-specific Assessment & Plan notes found for this encounter.      PHQ-2/9 Depression Screening            Body mass index is 24.51 kg/m².    BMI Counseling: Body mass index is 24.51 kg/m². The BMI     Subjective:      Patient ID: Ana Hernandez is a 53 y.o. female.    HPI    The following portions of the patient's history were reviewed and updated as appropriate:   She has a past medical history of Allergies, Anxiety, Asthma, Bereavement, Bipolar disorder (HCC), Carpal tunnel syndrome, bilateral, Chronic bronchitis (HCC), Chronic constipation, Chronic low back pain, Chronic neck pain, COPD (chronic obstructive pulmonary disease) (HCC), Costochondritis, DDD (degenerative disc disease), cervical, Degenerative joint disease of left hip, Depression, Diverticulitis, DJD (degenerative joint disease) of knee, DJD (degenerative joint disease), ankle and foot, Edema, Fatigue, GABE (generalized anxiety disorder), GERD (gastroesophageal reflux disease), Hernia, inguinal, Hernia, umbilical, Hyperlipidemia, Hypertension, IBS (irritable bowel syndrome), Insomnia, Lower GI bleed, Migraine, Neuropathy, PTSD (post-traumatic stress disorder), Raynaud disease, Scalp psoriasis, Situational depression, and Tobacco dependence.,  does not have any pertinent problems on file.,   has a past surgical history that includes Chest tube insertion; DXA procedure(historical) (09/04/2009 & 8/24/2005); Mammo (historical) (02/14/2014); Hernia repair; and Cryotherapy.,  family history includes Anxiety disorder in her other; Arthritis in her other; Cancer in her other; Depression in her other; Diabetes in her other; Heart disease in her other; No Known Problems in her maternal uncle, paternal uncle, and paternal uncle; Pulmonary fibrosis in her maternal aunt; Rheum arthritis in her maternal aunt; Stomach cancer in her father.,   reports that she has been smoking cigarettes. She started smoking about 42 years ago. She has a 84.5 pack-year  smoking history. She has been exposed to tobacco smoke. She has never used smokeless tobacco. She reports current alcohol use. She reports that she does not currently use drugs.,  is allergic to erythromycin..  Current Outpatient Medications   Medication Sig Dispense Refill   • HYDROcodone-acetaminophen (NORCO) 7.5-325 mg per tablet Take 1 tablet by mouth every 6 (six) hours as needed for pain Max Daily Amount: 3 tablets 90 tablet 0   • methylPREDNISolone 4 MG tablet therapy pack Use as directed on package 21 each 0   • albuterol (2.5 mg/3 mL) 0.083 % nebulizer solution Take 3 mL (2.5 mg total) by nebulization every 6 (six) hours as needed for wheezing or shortness of breath 180 mL 5   • atorvastatin (LIPITOR) 10 mg tablet Take 1 tablet (10 mg total) by mouth daily 90 tablet 1   • baclofen 10 mg tablet Take 1 tablet (10 mg total) by mouth 3 (three) times a day (Patient not taking: Reported on 12/18/2023) 90 tablet 4   • benzonatate (TESSALON) 200 MG capsule Take 1 capsule (200 mg total) by mouth 3 (three) times a day as needed for cough 30 capsule 2   • Calcium Carbonate 1500 (600 Ca) MG TABS Take one tablet by mouth twice a day. 180 tablet 1   • diclofenac (VOLTAREN) 75 mg EC tablet Take 1 tablet (75 mg total) by mouth 2 (two) times a day 60 tablet 5   • fluticasone-umeclidinium-vilanterol (Trelegy Ellipta) 200-62.5-25 mcg/actuation AEPB inhaler Inhale 1 puff daily Rinse mouth after use. 60 blister 5   • hydrOXYzine pamoate (VISTARIL) 50 mg capsule Take 1 capsule (50 mg total) by mouth 3 (three) times a day as needed for itching 30 capsule 5   • Ketotifen Fumarate (ZADITOR) 0.035 % ophthalmic solution Administer 1 drop to both eyes 2 (two) times a day 5 mL 5   • loratadine (CLARITIN) 10 mg tablet Take 1 tablet (10 mg total) by mouth daily 90 tablet 1   • methocarbamol (Robaxin-750) 750 mg tablet Take 1 tablet (750 mg total) by mouth 4 (four) times a day as needed for muscle spasms 120 tablet 2   • mirtazapine  "(REMERON) 45 MG tablet Take 1 tablet (45 mg total) by mouth daily at bedtime 30 tablet 2   • montelukast (SINGULAIR) 10 mg tablet Take 1 tablet (10 mg total) by mouth daily at bedtime 90 tablet 1   • Multiple Vitamins-Minerals (MULTIVITAMIN ADULT PO) Take by mouth     • nicotine (NICODERM CQ) 21 mg/24 hr TD 24 hr patch apply 1 patch to CLEAN, DRY, AND INTACT SKIN REMOVE AND REPLACE every 24 hours 28 patch 11   • promethazine-dextromethorphan (PHENERGAN-DM) 6.25-15 mg/5 mL oral syrup Take 5 mL by mouth 4 (four) times a day as needed for cough (Patient not taking: Reported on 11/20/2023) 180 mL 1   • pyridoxine (VITAMIN B6) 50 mg tablet Take 1 tablet (50 mg total) by mouth daily 90 tablet 1   • QUEtiapine (SEROquel) 100 mg tablet Take 1 tablet (100 mg total) by mouth daily at bedtime 90 tablet 1   • topiramate (TOPAMAX) 50 MG tablet Take 50 mg by mouth 2 (two) times a day (Patient not taking: Reported on 3/11/2022 )     • triamcinolone (KENALOG) 0.1 % cream Apply topically 2 (two) times a day 30 g 0   • TURMERIC PO Take 1 tablet by mouth       No current facility-administered medications for this visit.       Review of Systems   Constitutional:  Negative for chills and fever.   HENT:  Positive for congestion and rhinorrhea. Negative for ear pain and sore throat.    Eyes:  Positive for itching. Negative for pain and visual disturbance.   Respiratory:  Negative for cough and shortness of breath.    Cardiovascular:  Negative for chest pain and palpitations.   Gastrointestinal:  Negative for abdominal pain and vomiting.   Genitourinary:  Negative for dysuria and hematuria.   Musculoskeletal:  Positive for back pain and neck pain. Negative for arthralgias.   Skin:  Negative for color change and rash.   Neurological:  Negative for seizures and syncope.   All other systems reviewed and are negative.        Objective:    /84   Pulse 76   Temp 97.5 °F (36.4 °C)   Resp 16   Ht 5' 2\" (1.575 m)   Wt 60.8 kg (134 lb)   " BMI 24.51 kg/m²   Body mass index is 24.51 kg/m².     Physical Exam  Constitutional:       Appearance: Normal appearance. She is well-developed.   HENT:      Head: Normocephalic and atraumatic.      Right Ear: Tympanic membrane, ear canal and external ear normal.      Left Ear: Tympanic membrane, ear canal and external ear normal.      Nose: Nose normal.      Mouth/Throat:      Mouth: Mucous membranes are moist.      Pharynx: Oropharynx is clear.   Eyes:      Extraocular Movements: Extraocular movements intact.      Conjunctiva/sclera: Conjunctivae normal.      Pupils: Pupils are equal, round, and reactive to light.   Cardiovascular:      Rate and Rhythm: Normal rate and regular rhythm.      Pulses: Normal pulses.      Heart sounds: Normal heart sounds.   Pulmonary:      Effort: Pulmonary effort is normal.      Breath sounds: Normal breath sounds.   Abdominal:      General: Abdomen is flat. Bowel sounds are normal.      Palpations: Abdomen is soft.      Tenderness: There is no abdominal tenderness.   Musculoskeletal:         General: Normal range of motion.      Cervical back: Normal range of motion and neck supple.   Skin:     General: Skin is warm and dry.      Capillary Refill: Capillary refill takes less than 2 seconds.   Neurological:      General: No focal deficit present.      Mental Status: She is alert and oriented to person, place, and time.   Psychiatric:         Mood and Affect: Mood normal.         Behavior: Behavior normal.         Thought Content: Thought content normal.         Judgment: Judgment normal.

## 2024-03-27 LAB
6MAM UR QL SCN: NEGATIVE NG/ML
ACCEPTABLE CREAT UR QL: 294 MG/DL
AMPHET UR QL SCN: NEGATIVE NG/ML
BARBITURATES UR QL SCN: NEGATIVE NG/ML
BENZODIAZ UR QL SCN: NEGATIVE NG/ML
BUPRENORPHINE UR QL CFM: NEGATIVE NG/ML
CANNABINOIDS UR QL SCN: NEGATIVE NG/ML
CARISOPRODOL UR QL: NEGATIVE NG/ML
COCAINE+BZE UR QL SCN: NEGATIVE NG/ML
CODEINE UR QL CFM: NOT DETECTED NG/MG CREAT
DHC UR CFM-MCNC: 166 NG/MG CREAT
ETHYL GLUCURONIDE UR QL SCN: NEGATIVE NG/ML
FENTANYL UR QL SCN: NEGATIVE NG/ML
GABAPENTIN SERPLBLD QL SCN: NEGATIVE UG/ML
GABAPENTIN UR QL CFM: NOT DETECTED
HYDROCODONE UR QL CFM: 876 NG/MG CREAT
HYDROMORPHONE UR QL CFM: 219 NG/MG CREAT
METHADONE UR QL SCN: NEGATIVE NG/ML
MORPHINE UR QL CFM: NOT DETECTED NG/MG CREAT
NITRITE UR QL STRIP: NEGATIVE UG/ML
NORCODEINE/CREAT UR CFM: NOT DETECTED NG/MG CREAT
NORHYDROCODONE UR CFM-MCNC: 1305 NG/MG CREAT
NORMORPHINE: NOT DETECTED NG/MG CREAT
OPIATES UR QL SCN: ABNORMAL NG/ML
OXYCODONE+OXYMORPHONE UR QL SCN: NEGATIVE NG/ML
PCP UR QL CFM: NOT DETECTED
PCP UR QL SCN: NEGATIVE NG/ML
PREGABALIN UR QL CFM: NOT DETECTED
PROPOXYPH UR QL SCN: NEGATIVE NG/ML
SPECIMEN PH ACCEPTABLE UR: 5.7 (ref 4.5–8.9)
TAPENTADOL UR QL SCN: NEGATIVE NG/ML
TRAMADOL UR QL SCN: NEGATIVE NG/ML

## 2024-04-03 ENCOUNTER — TELEPHONE (OUTPATIENT)
Dept: FAMILY MEDICINE CLINIC | Facility: CLINIC | Age: 54
End: 2024-04-03

## 2024-04-03 DIAGNOSIS — J30.89 ENVIRONMENTAL AND SEASONAL ALLERGIES: ICD-10-CM

## 2024-04-03 RX ORDER — METHYLPREDNISOLONE 4 MG/1
TABLET ORAL
Qty: 21 EACH | Refills: 0 | Status: SHIPPED | OUTPATIENT
Start: 2024-04-03

## 2024-04-23 ENCOUNTER — TELEPHONE (OUTPATIENT)
Dept: FAMILY MEDICINE CLINIC | Facility: CLINIC | Age: 54
End: 2024-04-23

## 2024-04-26 ENCOUNTER — TELEPHONE (OUTPATIENT)
Dept: FAMILY MEDICINE CLINIC | Facility: CLINIC | Age: 54
End: 2024-04-26

## 2024-04-26 NOTE — TELEPHONE ENCOUNTER
Attempted call to patient to confirm appointment for Monday 4/29/2024 but there was no answer and no voicemail to leave a message.

## 2024-04-29 ENCOUNTER — TELEPHONE (OUTPATIENT)
Age: 54
End: 2024-04-29

## 2024-04-29 NOTE — TELEPHONE ENCOUNTER
Spoke to patient, needs to cancel today's appointment. Patient rescheduled for tomorrow, 04/30/2024 at 1:30pm

## 2024-04-29 NOTE — TELEPHONE ENCOUNTER
Patient needs appt before next wed and afternoon for her meds. Please call patient back asap and only wants to see her doctor

## 2024-04-30 ENCOUNTER — OFFICE VISIT (OUTPATIENT)
Dept: FAMILY MEDICINE CLINIC | Facility: CLINIC | Age: 54
End: 2024-04-30
Payer: COMMERCIAL

## 2024-04-30 VITALS
OXYGEN SATURATION: 96 % | BODY MASS INDEX: 24.33 KG/M2 | DIASTOLIC BLOOD PRESSURE: 64 MMHG | TEMPERATURE: 97.9 F | HEIGHT: 62 IN | HEART RATE: 91 BPM | SYSTOLIC BLOOD PRESSURE: 110 MMHG | WEIGHT: 132.2 LBS

## 2024-04-30 DIAGNOSIS — F17.200 TOBACCO USE DISORDER: ICD-10-CM

## 2024-04-30 DIAGNOSIS — M54.50 CHRONIC BILATERAL LOW BACK PAIN WITHOUT SCIATICA: ICD-10-CM

## 2024-04-30 DIAGNOSIS — M54.2 CHRONIC NECK AND BACK PAIN: Chronic | ICD-10-CM

## 2024-04-30 DIAGNOSIS — T78.40XD ALLERGY, SUBSEQUENT ENCOUNTER: Primary | ICD-10-CM

## 2024-04-30 DIAGNOSIS — G89.29 CHRONIC NECK AND BACK PAIN: Chronic | ICD-10-CM

## 2024-04-30 DIAGNOSIS — F33.41 RECURRENT MAJOR DEPRESSIVE DISORDER, IN PARTIAL REMISSION (HCC): ICD-10-CM

## 2024-04-30 DIAGNOSIS — J44.9 CHRONIC OBSTRUCTIVE PULMONARY DISEASE, UNSPECIFIED COPD TYPE (HCC): ICD-10-CM

## 2024-04-30 DIAGNOSIS — G89.29 CHRONIC BILATERAL LOW BACK PAIN WITHOUT SCIATICA: ICD-10-CM

## 2024-04-30 DIAGNOSIS — M54.9 CHRONIC NECK AND BACK PAIN: Chronic | ICD-10-CM

## 2024-04-30 PROCEDURE — 99214 OFFICE O/P EST MOD 30 MIN: CPT | Performed by: FAMILY MEDICINE

## 2024-04-30 RX ORDER — HYDROCODONE BITARTRATE AND ACETAMINOPHEN 7.5; 325 MG/1; MG/1
1 TABLET ORAL EVERY 6 HOURS PRN
Qty: 90 TABLET | Refills: 0 | Status: SHIPPED | OUTPATIENT
Start: 2024-04-30 | End: 2024-04-30

## 2024-04-30 RX ORDER — QUETIAPINE FUMARATE 100 MG/1
100 TABLET, FILM COATED ORAL
Qty: 90 TABLET | Refills: 1 | Status: SHIPPED | OUTPATIENT
Start: 2024-04-30

## 2024-04-30 RX ORDER — HYDROCODONE BITARTRATE AND ACETAMINOPHEN 7.5; 325 MG/1; MG/1
1 TABLET ORAL EVERY 6 HOURS PRN
Qty: 90 TABLET | Refills: 0 | Status: SHIPPED | OUTPATIENT
Start: 2024-04-30

## 2024-04-30 RX ORDER — MIRTAZAPINE 45 MG/1
45 TABLET, FILM COATED ORAL
Qty: 30 TABLET | Refills: 2 | Status: SHIPPED | OUTPATIENT
Start: 2024-04-30

## 2024-04-30 RX ORDER — BENZONATATE 200 MG/1
200 CAPSULE ORAL 3 TIMES DAILY PRN
Qty: 30 CAPSULE | Refills: 2 | Status: SHIPPED | OUTPATIENT
Start: 2024-04-30

## 2024-04-30 NOTE — PROGRESS NOTES
Patient states she has had a flareup in her allergies continues which added to her Singulair and Claritin    Chronic neck and low back pain the PDMP has been reviewed no issues found no evidence of diversion's or abuse    Chronic obstructive pulmonary disease the patient uses Trelegy as a maintenance inhaler and albuterol as a rescue inhaler    Recurrent major depression improved with Remeron and Seroquel    Tobacco use disorder patient has a 40-pack-year history of smoking she has been out of failure and nicotine replacement and is not a candidate for Wellbutrin or Chantix    Low-dose CT in the cancer screening program has been ordered the patient has not made arrangements as of yet she states she will make arrangements soonAssessment/Plan:    Problem List Items Addressed This Visit     Chronic bilateral low back pain without sciatica    Relevant Medications    Calcium Carbonate 1500 (600 Ca) MG TABS    Tobacco use disorder   Other Visit Diagnoses     Allergy, subsequent encounter    -  Primary    Chronic neck and back pain  (Chronic)       Relevant Medications    HYDROcodone-acetaminophen (NORCO) 7.5-325 mg per tablet    Chronic obstructive pulmonary disease, unspecified COPD type (HCC)        Relevant Medications    benzonatate (TESSALON) 200 MG capsule    Recurrent major depressive disorder, in partial remission (HCC)        Relevant Medications    QUEtiapine (SEROquel) 100 mg tablet    mirtazapine (REMERON) 45 MG tablet           Diagnoses and all orders for this visit:    Allergy, subsequent encounter    Chronic neck and back pain  -     HYDROcodone-acetaminophen (NORCO) 7.5-325 mg per tablet; Take 1 tablet by mouth every 6 (six) hours as needed for pain Max Daily Amount: 3 tablets    Chronic obstructive pulmonary disease, unspecified COPD type (HCC)  -     benzonatate (TESSALON) 200 MG capsule; Take 1 capsule (200 mg total) by mouth 3 (three) times a day as needed for cough    Chronic bilateral low back pain  without sciatica  -     Calcium Carbonate 1500 (600 Ca) MG TABS; Take one tablet by mouth twice a day.    Recurrent major depressive disorder, in partial remission (HCC)  -     QUEtiapine (SEROquel) 100 mg tablet; Take 1 tablet (100 mg total) by mouth daily at bedtime  -     mirtazapine (REMERON) 45 MG tablet; Take 1 tablet (45 mg total) by mouth daily at bedtime    Tobacco use disorder        No problem-specific Assessment & Plan notes found for this encounter.      PHQ-2/9 Depression Screening            Body mass index is 24.18 kg/m².    BMI Counseling: Body mass index is 24.18 kg/m². The BMI     Subjective:      Patient ID: Ana Hernandez is a 54 y.o. female.    Patient presents for 1 month checkup on chronic neck and low back pain, severe COPD, tobacco dependence and allergies        The following portions of the patient's history were reviewed and updated as appropriate:   She has a past medical history of Allergies, Anxiety, Asthma, Bereavement, Bipolar disorder ([facility]), Carpal tunnel syndrome, bilateral, Chronic bronchitis ([facility]), Chronic constipation, Chronic low back pain, Chronic neck pain, COPD (chronic obstructive pulmonary disease) ([facility]), Costochondritis, DDD (degenerative disc disease), cervical, Degenerative joint disease of left hip, Depression, Diverticulitis, DJD (degenerative joint disease) of knee, DJD (degenerative joint disease), ankle and foot, Edema, Fatigue, GABE (generalized anxiety disorder), GERD (gastroesophageal reflux disease), Hernia, inguinal, Hernia, umbilical, Hyperlipidemia, Hypertension, IBS (irritable bowel syndrome), Insomnia, Lower GI bleed, Migraine, Neuropathy, PTSD (post-traumatic stress disorder), Raynaud disease, Scalp psoriasis, Situational depression, and Tobacco dependence.,  does not have any pertinent problems on file.,   has a past surgical history that includes Chest tube insertion; DXA procedure(historical) (09/04/2009 & 8/24/2005); Mammo (historical) (02/14/2014);  Hernia repair; and Cryotherapy.,  family history includes Anxiety disorder in her other; Arthritis in her other; Cancer in her other; Depression in her other; Diabetes in her other; Heart disease in her other; No Known Problems in her maternal uncle, paternal uncle, and paternal uncle; Pulmonary fibrosis in her maternal aunt; Rheum arthritis in her maternal aunt; Stomach cancer in her father.,   reports that she has been smoking cigarettes. She started smoking about 42 years ago. She has a 84.7 pack-year smoking history. She has been exposed to tobacco smoke. She has never used smokeless tobacco. She reports current alcohol use. She reports that she does not currently use drugs.,  is allergic to erythromycin..  Current Outpatient Medications   Medication Sig Dispense Refill   • albuterol (2.5 mg/3 mL) 0.083 % nebulizer solution Take 3 mL (2.5 mg total) by nebulization every 6 (six) hours as needed for wheezing or shortness of breath 180 mL 5   • atorvastatin (LIPITOR) 10 mg tablet Take 1 tablet (10 mg total) by mouth daily 90 tablet 1   • benzonatate (TESSALON) 200 MG capsule Take 1 capsule (200 mg total) by mouth 3 (three) times a day as needed for cough 30 capsule 2   • Calcium Carbonate 1500 (600 Ca) MG TABS Take one tablet by mouth twice a day. 180 tablet 1   • diclofenac (VOLTAREN) 75 mg EC tablet Take 1 tablet (75 mg total) by mouth 2 (two) times a day 60 tablet 5   • fluticasone-umeclidinium-vilanterol (Trelegy Ellipta) 200-62.5-25 mcg/actuation AEPB inhaler Inhale 1 puff daily Rinse mouth after use. 60 blister 5   • HYDROcodone-acetaminophen (NORCO) 7.5-325 mg per tablet Take 1 tablet by mouth every 6 (six) hours as needed for pain Max Daily Amount: 3 tablets 90 tablet 0   • hydrOXYzine pamoate (VISTARIL) 50 mg capsule Take 1 capsule (50 mg total) by mouth 3 (three) times a day as needed for itching 30 capsule 5   • Ketotifen Fumarate (ZADITOR) 0.035 % ophthalmic solution Administer 1 drop to both eyes 2  (two) times a day 5 mL 5   • loratadine (CLARITIN) 10 mg tablet Take 1 tablet (10 mg total) by mouth daily 90 tablet 1   • methocarbamol (Robaxin-750) 750 mg tablet Take 1 tablet (750 mg total) by mouth 4 (four) times a day as needed for muscle spasms 120 tablet 2   • methylPREDNISolone 4 MG tablet therapy pack Use as directed on package 21 each 0   • mirtazapine (REMERON) 45 MG tablet Take 1 tablet (45 mg total) by mouth daily at bedtime 30 tablet 2   • montelukast (SINGULAIR) 10 mg tablet Take 1 tablet (10 mg total) by mouth daily at bedtime 90 tablet 1   • Multiple Vitamins-Minerals (MULTIVITAMIN ADULT PO) Take by mouth     • nicotine (NICODERM CQ) 21 mg/24 hr TD 24 hr patch apply 1 patch to CLEAN, DRY, AND INTACT SKIN REMOVE AND REPLACE every 24 hours 28 patch 11   • pyridoxine (VITAMIN B6) 50 mg tablet Take 1 tablet (50 mg total) by mouth daily 90 tablet 1   • QUEtiapine (SEROquel) 100 mg tablet Take 1 tablet (100 mg total) by mouth daily at bedtime 90 tablet 1   • triamcinolone (KENALOG) 0.1 % cream Apply topically 2 (two) times a day 30 g 0   • TURMERIC PO Take 1 tablet by mouth     • baclofen 10 mg tablet Take 1 tablet (10 mg total) by mouth 3 (three) times a day (Patient not taking: Reported on 12/18/2023) 90 tablet 4   • promethazine-dextromethorphan (PHENERGAN-DM) 6.25-15 mg/5 mL oral syrup Take 5 mL by mouth 4 (four) times a day as needed for cough (Patient not taking: Reported on 4/30/2024) 180 mL 1   • topiramate (TOPAMAX) 50 MG tablet Take 50 mg by mouth 2 (two) times a day (Patient not taking: Reported on 3/11/2022)       No current facility-administered medications for this visit.       Review of Systems   Constitutional:  Negative for chills and fever.   HENT:  Positive for rhinorrhea. Negative for ear pain and sore throat.    Eyes:  Positive for itching. Negative for pain and visual disturbance.   Respiratory:  Positive for cough, shortness of breath and wheezing.    Cardiovascular:  Negative for  "chest pain and palpitations.   Gastrointestinal:  Negative for abdominal pain and vomiting.   Genitourinary:  Negative for dysuria and hematuria.   Musculoskeletal:  Positive for back pain and neck pain. Negative for arthralgias.   Skin:  Negative for color change and rash.   Neurological:  Negative for seizures and syncope.   All other systems reviewed and are negative.        Objective:    /64   Pulse 91   Temp 97.9 °F (36.6 °C) (Tympanic)   Ht 5' 2\" (1.575 m)   Wt 60 kg (132 lb 3.2 oz)   SpO2 96%   BMI 24.18 kg/m²   Body mass index is 24.18 kg/m².     Physical Exam  Constitutional:       Appearance: Normal appearance. She is well-developed.   HENT:      Head: Normocephalic and atraumatic.      Right Ear: Tympanic membrane, ear canal and external ear normal.      Left Ear: Tympanic membrane, ear canal and external ear normal.      Nose: Nose normal.      Mouth/Throat:      Mouth: Mucous membranes are moist.      Pharynx: Oropharynx is clear.   Eyes:      General: Allergic shiner present.      Extraocular Movements: Extraocular movements intact.      Conjunctiva/sclera: Conjunctivae normal.      Pupils: Pupils are equal, round, and reactive to light.   Cardiovascular:      Rate and Rhythm: Normal rate and regular rhythm.      Pulses: Normal pulses.      Heart sounds: Normal heart sounds.   Pulmonary:      Effort: Pulmonary effort is normal.      Breath sounds: Rhonchi present.   Abdominal:      General: Abdomen is flat. Bowel sounds are normal.      Palpations: Abdomen is soft.      Tenderness: There is no abdominal tenderness.   Musculoskeletal:         General: Normal range of motion.      Cervical back: Normal range of motion and neck supple.   Skin:     General: Skin is warm and dry.      Capillary Refill: Capillary refill takes less than 2 seconds.   Neurological:      General: No focal deficit present.      Mental Status: She is alert and oriented to person, place, and time.   Psychiatric:         " Mood and Affect: Mood normal.         Behavior: Behavior normal.         Thought Content: Thought content normal.         Judgment: Judgment normal.

## 2024-05-23 ENCOUNTER — TELEPHONE (OUTPATIENT)
Age: 54
End: 2024-05-23

## 2024-06-03 ENCOUNTER — OFFICE VISIT (OUTPATIENT)
Dept: FAMILY MEDICINE CLINIC | Facility: CLINIC | Age: 54
End: 2024-06-03
Payer: COMMERCIAL

## 2024-06-03 VITALS
SYSTOLIC BLOOD PRESSURE: 134 MMHG | HEART RATE: 84 BPM | DIASTOLIC BLOOD PRESSURE: 84 MMHG | HEIGHT: 62 IN | BODY MASS INDEX: 23.92 KG/M2 | WEIGHT: 130 LBS | TEMPERATURE: 97.9 F | RESPIRATION RATE: 20 BRPM

## 2024-06-03 DIAGNOSIS — M54.2 CHRONIC NECK AND BACK PAIN: Primary | Chronic | ICD-10-CM

## 2024-06-03 DIAGNOSIS — F33.41 RECURRENT MAJOR DEPRESSIVE DISORDER, IN PARTIAL REMISSION (HCC): ICD-10-CM

## 2024-06-03 DIAGNOSIS — M54.50 CHRONIC BILATERAL LOW BACK PAIN WITHOUT SCIATICA: ICD-10-CM

## 2024-06-03 DIAGNOSIS — G89.29 CHRONIC BILATERAL LOW BACK PAIN WITHOUT SCIATICA: ICD-10-CM

## 2024-06-03 DIAGNOSIS — F17.200 TOBACCO USE DISORDER: ICD-10-CM

## 2024-06-03 DIAGNOSIS — G89.29 CHRONIC NECK AND BACK PAIN: Primary | Chronic | ICD-10-CM

## 2024-06-03 DIAGNOSIS — M54.9 CHRONIC NECK AND BACK PAIN: Primary | Chronic | ICD-10-CM

## 2024-06-03 PROCEDURE — 99213 OFFICE O/P EST LOW 20 MIN: CPT | Performed by: FAMILY MEDICINE

## 2024-06-03 RX ORDER — HYDROCODONE BITARTRATE AND ACETAMINOPHEN 7.5; 325 MG/1; MG/1
1 TABLET ORAL EVERY 6 HOURS PRN
Qty: 90 TABLET | Refills: 0 | Status: SHIPPED | OUTPATIENT
Start: 2024-06-06

## 2024-06-03 NOTE — PROGRESS NOTES
Assessment/Plan: Chronic neck and low back pain the PDMP has been reviewed no issues found no evidence of divergence or abuse.    Chronic obstructive pulmonary disease the patient uses Trelegy as a maintenance inhaler and albuterol as a rescue inhaler.  The patient continues to smoke    Recurrent major depression treated with Remeron 45 mg at bedtime and Seroquel 100 mg at bedtime    Problem List Items Addressed This Visit     Chronic bilateral low back pain without sciatica    Tobacco use disorder   Other Visit Diagnoses     Chronic neck and back pain  (Chronic)   -  Primary    Relevant Medications    HYDROcodone-acetaminophen (NORCO) 7.5-325 mg per tablet (Start on 6/6/2024)    Recurrent major depressive disorder, in partial remission (HCC)               Diagnoses and all orders for this visit:    Chronic neck and back pain  -     HYDROcodone-acetaminophen (NORCO) 7.5-325 mg per tablet; Take 1 tablet by mouth every 6 (six) hours as needed for pain Max Daily Amount: 3 tablets Do not start before June 6, 2024.    Chronic bilateral low back pain without sciatica    Tobacco use disorder    Recurrent major depressive disorder, in partial remission (HCC)        No problem-specific Assessment & Plan notes found for this encounter.      PHQ-2/9 Depression Screening            Body mass index is 23.78 kg/m².    BMI Counseling:     Subjective:      Patient ID: Ana Hernandez is a 54 y.o. female.    Patient presents for 1 month checkup on chronic neck and back pain    Medication Refill  Associated symptoms include neck pain. Pertinent negatives include no abdominal pain, arthralgias, chest pain, chills, coughing, fever, rash, sore throat or vomiting.       The following portions of the patient's history were reviewed and updated as appropriate:   She has a past medical history of Allergies, Anxiety, Asthma, Bereavement, Bipolar disorder ([facility]), Carpal tunnel syndrome, bilateral, Chronic bronchitis ([facility]), Chronic constipation,  Chronic low back pain, Chronic neck pain, COPD (chronic obstructive pulmonary disease) (HCC), Costochondritis, DDD (degenerative disc disease), cervical, Degenerative joint disease of left hip, Depression, Diverticulitis, DJD (degenerative joint disease) of knee, DJD (degenerative joint disease), ankle and foot, Edema, Fatigue, GABE (generalized anxiety disorder), GERD (gastroesophageal reflux disease), Hernia, inguinal, Hernia, umbilical, Hyperlipidemia, Hypertension, IBS (irritable bowel syndrome), Insomnia, Lower GI bleed, Migraine, Neuropathy, PTSD (post-traumatic stress disorder), Raynaud disease, Scalp psoriasis, Situational depression, and Tobacco dependence.,  does not have any pertinent problems on file.,   has a past surgical history that includes Chest tube insertion; DXA procedure(historical) (09/04/2009 & 8/24/2005); Mammo (historical) (02/14/2014); Hernia repair; and Cryotherapy.,  family history includes Anxiety disorder in her other; Arthritis in her other; Cancer in her other; Depression in her other; Diabetes in her other; Heart disease in her other; No Known Problems in her maternal uncle, paternal uncle, and paternal uncle; Pulmonary fibrosis in her maternal aunt; Rheum arthritis in her maternal aunt; Stomach cancer in her father.,   reports that she has been smoking cigarettes. She started smoking about 42 years ago. She has a 84.8 pack-year smoking history. She has been exposed to tobacco smoke. She has never used smokeless tobacco. She reports current alcohol use. She reports that she does not currently use drugs.,  is allergic to erythromycin..  Current Outpatient Medications   Medication Sig Dispense Refill   • [START ON 6/6/2024] HYDROcodone-acetaminophen (NORCO) 7.5-325 mg per tablet Take 1 tablet by mouth every 6 (six) hours as needed for pain Max Daily Amount: 3 tablets Do not start before June 6, 2024. 90 tablet 0   • albuterol (2.5 mg/3 mL) 0.083 % nebulizer solution Take 3 mL (2.5 mg  total) by nebulization every 6 (six) hours as needed for wheezing or shortness of breath 180 mL 5   • atorvastatin (LIPITOR) 10 mg tablet Take 1 tablet (10 mg total) by mouth daily 90 tablet 1   • baclofen 10 mg tablet Take 1 tablet (10 mg total) by mouth 3 (three) times a day (Patient not taking: Reported on 12/18/2023) 90 tablet 4   • benzonatate (TESSALON) 200 MG capsule Take 1 capsule (200 mg total) by mouth 3 (three) times a day as needed for cough 30 capsule 2   • Calcium Carbonate 1500 (600 Ca) MG TABS Take one tablet by mouth twice a day. 180 tablet 1   • diclofenac (VOLTAREN) 75 mg EC tablet Take 1 tablet (75 mg total) by mouth 2 (two) times a day 60 tablet 5   • fluticasone-umeclidinium-vilanterol (Trelegy Ellipta) 200-62.5-25 mcg/actuation AEPB inhaler Inhale 1 puff daily Rinse mouth after use. 60 blister 5   • hydrOXYzine pamoate (VISTARIL) 50 mg capsule Take 1 capsule (50 mg total) by mouth 3 (three) times a day as needed for itching 30 capsule 5   • Ketotifen Fumarate (ZADITOR) 0.035 % ophthalmic solution Administer 1 drop to both eyes 2 (two) times a day 5 mL 5   • loratadine (CLARITIN) 10 mg tablet Take 1 tablet (10 mg total) by mouth daily 90 tablet 1   • methocarbamol (Robaxin-750) 750 mg tablet Take 1 tablet (750 mg total) by mouth 4 (four) times a day as needed for muscle spasms 120 tablet 2   • methylPREDNISolone 4 MG tablet therapy pack Use as directed on package 21 each 0   • mirtazapine (REMERON) 45 MG tablet Take 1 tablet (45 mg total) by mouth daily at bedtime 30 tablet 2   • montelukast (SINGULAIR) 10 mg tablet Take 1 tablet (10 mg total) by mouth daily at bedtime 90 tablet 1   • Multiple Vitamins-Minerals (MULTIVITAMIN ADULT PO) Take by mouth     • nicotine (NICODERM CQ) 21 mg/24 hr TD 24 hr patch apply 1 patch to CLEAN, DRY, AND INTACT SKIN REMOVE AND REPLACE every 24 hours 28 patch 11   • promethazine-dextromethorphan (PHENERGAN-DM) 6.25-15 mg/5 mL oral syrup Take 5 mL by mouth 4 (four)  "times a day as needed for cough (Patient not taking: Reported on 4/30/2024) 180 mL 1   • pyridoxine (VITAMIN B6) 50 mg tablet Take 1 tablet (50 mg total) by mouth daily 90 tablet 1   • QUEtiapine (SEROquel) 100 mg tablet Take 1 tablet (100 mg total) by mouth daily at bedtime 90 tablet 1   • topiramate (TOPAMAX) 50 MG tablet Take 50 mg by mouth 2 (two) times a day (Patient not taking: Reported on 3/11/2022)     • triamcinolone (KENALOG) 0.1 % cream Apply topically 2 (two) times a day 30 g 0   • TURMERIC PO Take 1 tablet by mouth       No current facility-administered medications for this visit.       Review of Systems   Constitutional:  Negative for chills and fever.   HENT:  Negative for ear pain and sore throat.    Eyes:  Negative for pain and visual disturbance.   Respiratory:  Negative for cough and shortness of breath.    Cardiovascular:  Negative for chest pain and palpitations.   Gastrointestinal:  Negative for abdominal pain and vomiting.   Genitourinary:  Negative for dysuria and hematuria.   Musculoskeletal:  Positive for back pain and neck pain. Negative for arthralgias.   Skin:  Negative for color change and rash.   Neurological:  Negative for seizures and syncope.   All other systems reviewed and are negative.        Objective:    /84   Pulse 84   Temp 97.9 °F (36.6 °C)   Resp 20   Ht 5' 2\" (1.575 m)   Wt 59 kg (130 lb)   BMI 23.78 kg/m²   Body mass index is 23.78 kg/m².     Physical Exam  Constitutional:       Appearance: Normal appearance. She is well-developed.   HENT:      Head: Normocephalic and atraumatic.      Right Ear: Tympanic membrane, ear canal and external ear normal.      Left Ear: Tympanic membrane, ear canal and external ear normal.      Nose: Nose normal.      Mouth/Throat:      Mouth: Mucous membranes are moist.      Pharynx: Oropharynx is clear.   Eyes:      Extraocular Movements: Extraocular movements intact.      Conjunctiva/sclera: Conjunctivae normal.      Pupils: " Pupils are equal, round, and reactive to light.   Cardiovascular:      Rate and Rhythm: Normal rate and regular rhythm.      Pulses: Normal pulses.      Heart sounds: Normal heart sounds.   Pulmonary:      Effort: Pulmonary effort is normal.      Breath sounds: Normal breath sounds.   Abdominal:      General: Abdomen is flat. Bowel sounds are normal.      Palpations: Abdomen is soft.      Tenderness: There is no abdominal tenderness.   Musculoskeletal:         General: Normal range of motion.      Cervical back: Normal range of motion and neck supple.   Skin:     General: Skin is warm and dry.      Capillary Refill: Capillary refill takes less than 2 seconds.   Neurological:      General: No focal deficit present.      Mental Status: She is alert and oriented to person, place, and time.   Psychiatric:         Mood and Affect: Mood normal.         Behavior: Behavior normal.         Thought Content: Thought content normal.         Judgment: Judgment normal.

## 2024-06-28 DIAGNOSIS — E78.2 MIXED HYPERLIPIDEMIA: ICD-10-CM

## 2024-06-28 RX ORDER — ATORVASTATIN CALCIUM 10 MG/1
10 TABLET, FILM COATED ORAL DAILY
Qty: 30 TABLET | Refills: 0 | Status: SHIPPED | OUTPATIENT
Start: 2024-06-28

## 2024-07-01 ENCOUNTER — TELEPHONE (OUTPATIENT)
Age: 54
End: 2024-07-01

## 2024-07-01 NOTE — TELEPHONE ENCOUNTER
Patient called asking to speak with Chanelle regarding her appt on 7/3.Wants Chanelle to squeeze her in on Friday 7/5 for a physical. Also, wants to discuss her mother's (Kelsea Hernandez) medication Oxy which she called in on Friday however, Dr. Barron was not in the office to send to pharmacy. They are upset because they weren't notified that he wasn't in the office.

## 2024-07-01 NOTE — TELEPHONE ENCOUNTER
Spoke to patient; patient very upset with access center-- no one told patient that provider was out of office. Patient wants to rescheduled her appointment but advised no open appointments until 07/15/2024 but patient does not want to wait that long for her Narcotic refill. Patient will keep 07/03/2024 appointment but wants to be notified if there is a cancellation for 07/05/2024 or 07/08/2024.

## 2024-07-03 ENCOUNTER — OFFICE VISIT (OUTPATIENT)
Dept: FAMILY MEDICINE CLINIC | Facility: CLINIC | Age: 54
End: 2024-07-03
Payer: COMMERCIAL

## 2024-07-03 VITALS
RESPIRATION RATE: 20 BRPM | HEART RATE: 84 BPM | DIASTOLIC BLOOD PRESSURE: 84 MMHG | SYSTOLIC BLOOD PRESSURE: 134 MMHG | TEMPERATURE: 97.5 F

## 2024-07-03 DIAGNOSIS — J30.89 ENVIRONMENTAL AND SEASONAL ALLERGIES: ICD-10-CM

## 2024-07-03 DIAGNOSIS — J44.9 CHRONIC OBSTRUCTIVE PULMONARY DISEASE, UNSPECIFIED COPD TYPE (HCC): ICD-10-CM

## 2024-07-03 DIAGNOSIS — G89.29 CHRONIC NECK AND BACK PAIN: Chronic | ICD-10-CM

## 2024-07-03 DIAGNOSIS — M54.50 CHRONIC BILATERAL LOW BACK PAIN WITHOUT SCIATICA: Primary | ICD-10-CM

## 2024-07-03 DIAGNOSIS — M54.2 CHRONIC NECK AND BACK PAIN: Chronic | ICD-10-CM

## 2024-07-03 DIAGNOSIS — G89.29 CHRONIC BILATERAL LOW BACK PAIN WITHOUT SCIATICA: Primary | ICD-10-CM

## 2024-07-03 DIAGNOSIS — G62.9 NEUROPATHY: ICD-10-CM

## 2024-07-03 DIAGNOSIS — F17.200 TOBACCO USE DISORDER: ICD-10-CM

## 2024-07-03 DIAGNOSIS — M54.9 CHRONIC NECK AND BACK PAIN: Chronic | ICD-10-CM

## 2024-07-03 PROCEDURE — 99214 OFFICE O/P EST MOD 30 MIN: CPT | Performed by: FAMILY MEDICINE

## 2024-07-03 RX ORDER — LANOLIN ALCOHOL/MO/W.PET/CERES
50 CREAM (GRAM) TOPICAL DAILY
Qty: 90 TABLET | Refills: 1 | Status: SHIPPED | OUTPATIENT
Start: 2024-07-03

## 2024-07-03 RX ORDER — MONTELUKAST SODIUM 10 MG/1
10 TABLET ORAL
Qty: 90 TABLET | Refills: 1 | Status: SHIPPED | OUTPATIENT
Start: 2024-07-03

## 2024-07-03 RX ORDER — HYDROCODONE BITARTRATE AND ACETAMINOPHEN 7.5; 325 MG/1; MG/1
1 TABLET ORAL EVERY 6 HOURS PRN
Qty: 90 TABLET | Refills: 0 | Status: SHIPPED | OUTPATIENT
Start: 2024-07-03

## 2024-07-03 RX ORDER — BENZONATATE 200 MG/1
200 CAPSULE ORAL 3 TIMES DAILY PRN
Qty: 30 CAPSULE | Refills: 2 | Status: SHIPPED | OUTPATIENT
Start: 2024-07-03

## 2024-07-03 RX ORDER — DICLOFENAC SODIUM 75 MG/1
75 TABLET, DELAYED RELEASE ORAL 2 TIMES DAILY
Qty: 60 TABLET | Refills: 5 | Status: SHIPPED | OUTPATIENT
Start: 2024-07-03

## 2024-07-03 NOTE — PROGRESS NOTES
Assessment/Plan: Chronic neck and chronic low back pain treated with hydrocodone.  The PDMP has been reviewed no issues found no evidence of diversion or abuse    Chronic obstructive pulmonary disease Trelegy is used as a maintenance inhaler albuterol as a rescue inhaler    Environmental and seasonal allergies treated with Singulair and Zaditor    Vitamin B6 is taken for peripheral neuropathy    Tobacco use disorder the patient is not willing to quit at this time    Problem List Items Addressed This Visit       Chronic bilateral low back pain without sciatica - Primary    Relevant Medications    diclofenac (VOLTAREN) 75 mg EC tablet    Tobacco use disorder     Other Visit Diagnoses       Chronic neck and back pain  (Chronic)       Relevant Medications    HYDROcodone-acetaminophen (NORCO) 7.5-325 mg per tablet    Chronic obstructive pulmonary disease, unspecified COPD type (HCC)        Relevant Medications    benzonatate (TESSALON) 200 MG capsule    montelukast (SINGULAIR) 10 mg tablet    Environmental and seasonal allergies        Relevant Medications    montelukast (SINGULAIR) 10 mg tablet    Neuropathy        Relevant Medications    pyridoxine (VITAMIN B6) 50 mg tablet             Diagnoses and all orders for this visit:    Chronic bilateral low back pain without sciatica  -     diclofenac (VOLTAREN) 75 mg EC tablet; Take 1 tablet (75 mg total) by mouth 2 (two) times a day    Chronic neck and back pain  -     HYDROcodone-acetaminophen (NORCO) 7.5-325 mg per tablet; Take 1 tablet by mouth every 6 (six) hours as needed for pain Max Daily Amount: 3 tablets    Chronic obstructive pulmonary disease, unspecified COPD type (HCC)  -     benzonatate (TESSALON) 200 MG capsule; Take 1 capsule (200 mg total) by mouth 3 (three) times a day as needed for cough    Environmental and seasonal allergies  -     montelukast (SINGULAIR) 10 mg tablet; Take 1 tablet (10 mg total) by mouth daily at bedtime    Neuropathy  -     pyridoxine  (VITAMIN B6) 50 mg tablet; Take 1 tablet (50 mg total) by mouth daily    Tobacco use disorder        No problem-specific Assessment & Plan notes found for this encounter.      PHQ-2/9 Depression Screening              There is no height or weight on file to calculate BMI.    BMI Counseling: There is no height or weight on file to calculate BMI. The BMI     Subjective:      Patient ID: Ana Hernandez is a 54 y.o. female.    Patient presents for 1 month checkup on severe COPD chronic neck and chronic back pain        The following portions of the patient's history were reviewed and updated as appropriate:   She has a past medical history of Allergies, Anxiety, Asthma, Bereavement, Bipolar disorder (HCC), Carpal tunnel syndrome, bilateral, Chronic bronchitis (HCC), Chronic constipation, Chronic low back pain, Chronic neck pain, COPD (chronic obstructive pulmonary disease) (HCC), Costochondritis, DDD (degenerative disc disease), cervical, Degenerative joint disease of left hip, Depression, Diverticulitis, DJD (degenerative joint disease) of knee, DJD (degenerative joint disease), ankle and foot, Edema, Fatigue, GABE (generalized anxiety disorder), GERD (gastroesophageal reflux disease), Hernia, inguinal, Hernia, umbilical, Hyperlipidemia, Hypertension, IBS (irritable bowel syndrome), Insomnia, Lower GI bleed, Migraine, Neuropathy, PTSD (post-traumatic stress disorder), Raynaud disease, Scalp psoriasis, Situational depression, and Tobacco dependence.,  does not have any pertinent problems on file.,   has a past surgical history that includes Chest tube insertion; DXA procedure(historical) (09/04/2009 & 8/24/2005); Mammo (historical) (02/14/2014); Hernia repair; and Cryotherapy.,  family history includes Anxiety disorder in her other; Arthritis in her other; Cancer in her other; Depression in her other; Diabetes in her other; Heart disease in her other; No Known Problems in her maternal uncle, paternal uncle, and paternal  uncle; Pulmonary fibrosis in her maternal aunt; Rheum arthritis in her maternal aunt; Stomach cancer in her father.,   reports that she has been smoking cigarettes. She started smoking about 42 years ago. She has a 85 pack-year smoking history. She has been exposed to tobacco smoke. She has never used smokeless tobacco. She reports current alcohol use. She reports that she does not currently use drugs.,  is allergic to erythromycin..  Current Outpatient Medications   Medication Sig Dispense Refill    benzonatate (TESSALON) 200 MG capsule Take 1 capsule (200 mg total) by mouth 3 (three) times a day as needed for cough 30 capsule 2    diclofenac (VOLTAREN) 75 mg EC tablet Take 1 tablet (75 mg total) by mouth 2 (two) times a day 60 tablet 5    HYDROcodone-acetaminophen (NORCO) 7.5-325 mg per tablet Take 1 tablet by mouth every 6 (six) hours as needed for pain Max Daily Amount: 3 tablets 90 tablet 0    montelukast (SINGULAIR) 10 mg tablet Take 1 tablet (10 mg total) by mouth daily at bedtime 90 tablet 1    pyridoxine (VITAMIN B6) 50 mg tablet Take 1 tablet (50 mg total) by mouth daily 90 tablet 1    albuterol (2.5 mg/3 mL) 0.083 % nebulizer solution Take 3 mL (2.5 mg total) by nebulization every 6 (six) hours as needed for wheezing or shortness of breath 180 mL 5    atorvastatin (LIPITOR) 10 mg tablet take 1 tablet by mouth once daily 30 tablet 0    baclofen 10 mg tablet Take 1 tablet (10 mg total) by mouth 3 (three) times a day (Patient not taking: Reported on 12/18/2023) 90 tablet 4    Calcium Carbonate 1500 (600 Ca) MG TABS Take one tablet by mouth twice a day. 180 tablet 1    fluticasone-umeclidinium-vilanterol (Trelegy Ellipta) 200-62.5-25 mcg/actuation AEPB inhaler Inhale 1 puff daily Rinse mouth after use. 60 blister 5    hydrOXYzine pamoate (VISTARIL) 50 mg capsule Take 1 capsule (50 mg total) by mouth 3 (three) times a day as needed for itching 30 capsule 5    Ketotifen Fumarate (ZADITOR) 0.035 % ophthalmic  solution Administer 1 drop to both eyes 2 (two) times a day 5 mL 5    loratadine (CLARITIN) 10 mg tablet Take 1 tablet (10 mg total) by mouth daily 90 tablet 1    methocarbamol (Robaxin-750) 750 mg tablet Take 1 tablet (750 mg total) by mouth 4 (four) times a day as needed for muscle spasms 120 tablet 2    methylPREDNISolone 4 MG tablet therapy pack Use as directed on package 21 each 0    mirtazapine (REMERON) 45 MG tablet Take 1 tablet (45 mg total) by mouth daily at bedtime 30 tablet 2    Multiple Vitamins-Minerals (MULTIVITAMIN ADULT PO) Take by mouth      nicotine (NICODERM CQ) 21 mg/24 hr TD 24 hr patch apply 1 patch to CLEAN, DRY, AND INTACT SKIN REMOVE AND REPLACE every 24 hours 28 patch 11    promethazine-dextromethorphan (PHENERGAN-DM) 6.25-15 mg/5 mL oral syrup Take 5 mL by mouth 4 (four) times a day as needed for cough (Patient not taking: Reported on 4/30/2024) 180 mL 1    QUEtiapine (SEROquel) 100 mg tablet Take 1 tablet (100 mg total) by mouth daily at bedtime 90 tablet 1    topiramate (TOPAMAX) 50 MG tablet Take 50 mg by mouth 2 (two) times a day (Patient not taking: Reported on 3/11/2022)      triamcinolone (KENALOG) 0.1 % cream Apply topically 2 (two) times a day 30 g 0    TURMERIC PO Take 1 tablet by mouth       No current facility-administered medications for this visit.       Review of Systems   Constitutional:  Negative for chills and fever.   HENT:  Negative for ear pain and sore throat.    Eyes:  Negative for pain and visual disturbance.   Respiratory:  Negative for cough and shortness of breath.    Cardiovascular:  Negative for chest pain and palpitations.   Gastrointestinal:  Negative for abdominal pain and vomiting.   Genitourinary:  Negative for dysuria and hematuria.   Musculoskeletal:  Positive for arthralgias, back pain and neck pain.   Skin:  Negative for color change and rash.   Neurological:  Negative for seizures and syncope.   All other systems reviewed and are negative.         Objective:    /84   Pulse 84   Temp 97.5 °F (36.4 °C)   Resp 20   There is no height or weight on file to calculate BMI.     Physical Exam  Constitutional:       Appearance: Normal appearance. She is well-developed.   HENT:      Head: Normocephalic and atraumatic.      Right Ear: Tympanic membrane, ear canal and external ear normal.      Left Ear: Tympanic membrane, ear canal and external ear normal.      Nose: Nose normal.      Mouth/Throat:      Mouth: Mucous membranes are moist.      Pharynx: Oropharynx is clear.   Eyes:      Extraocular Movements: Extraocular movements intact.      Conjunctiva/sclera: Conjunctivae normal.      Pupils: Pupils are equal, round, and reactive to light.   Cardiovascular:      Rate and Rhythm: Normal rate and regular rhythm.      Pulses: Normal pulses.      Heart sounds: Normal heart sounds.   Pulmonary:      Effort: Pulmonary effort is normal.      Breath sounds: Normal breath sounds.   Abdominal:      General: Abdomen is flat. Bowel sounds are normal.      Palpations: Abdomen is soft.      Tenderness: There is no abdominal tenderness.   Musculoskeletal:         General: Normal range of motion.      Cervical back: Normal range of motion and neck supple.      Comments: Decreased range of motion of the cervical spine with multiple trigger points lumbar paraspinal spasm with bilateral SI tenderness   Skin:     General: Skin is warm and dry.      Capillary Refill: Capillary refill takes less than 2 seconds.   Neurological:      General: No focal deficit present.      Mental Status: She is alert and oriented to person, place, and time.   Psychiatric:         Mood and Affect: Mood normal.         Behavior: Behavior normal.         Thought Content: Thought content normal.         Judgment: Judgment normal.

## 2024-07-13 DIAGNOSIS — F41.9 ANXIETY: ICD-10-CM

## 2024-07-14 RX ORDER — HYDROXYZINE PAMOATE 50 MG/1
CAPSULE ORAL
Qty: 30 CAPSULE | Refills: 5 | Status: SHIPPED | OUTPATIENT
Start: 2024-07-14

## 2024-07-27 DIAGNOSIS — F33.41 RECURRENT MAJOR DEPRESSIVE DISORDER, IN PARTIAL REMISSION (HCC): ICD-10-CM

## 2024-07-28 RX ORDER — MIRTAZAPINE 45 MG/1
45 TABLET, FILM COATED ORAL
Qty: 30 TABLET | Refills: 0 | Status: SHIPPED | OUTPATIENT
Start: 2024-07-28

## 2024-08-03 DIAGNOSIS — Z88.9 HISTORY OF SEASONAL ALLERGIES: ICD-10-CM

## 2024-08-04 RX ORDER — LORATADINE 10 MG/1
10 TABLET ORAL DAILY
Qty: 100 TABLET | Refills: 1 | Status: SHIPPED | OUTPATIENT
Start: 2024-08-04

## 2024-08-05 ENCOUNTER — OFFICE VISIT (OUTPATIENT)
Dept: FAMILY MEDICINE CLINIC | Facility: CLINIC | Age: 54
End: 2024-08-05
Payer: COMMERCIAL

## 2024-08-05 VITALS
BODY MASS INDEX: 23.55 KG/M2 | SYSTOLIC BLOOD PRESSURE: 134 MMHG | DIASTOLIC BLOOD PRESSURE: 80 MMHG | RESPIRATION RATE: 20 BRPM | HEIGHT: 62 IN | TEMPERATURE: 97.9 F | HEART RATE: 84 BPM | WEIGHT: 128 LBS

## 2024-08-05 DIAGNOSIS — M54.2 CHRONIC NECK AND BACK PAIN: Chronic | ICD-10-CM

## 2024-08-05 DIAGNOSIS — Z00.00 ANNUAL PHYSICAL EXAM: Primary | ICD-10-CM

## 2024-08-05 DIAGNOSIS — M54.50 CHRONIC BILATERAL LOW BACK PAIN WITHOUT SCIATICA: ICD-10-CM

## 2024-08-05 DIAGNOSIS — F17.200 TOBACCO USE DISORDER: ICD-10-CM

## 2024-08-05 DIAGNOSIS — M54.9 CHRONIC NECK AND BACK PAIN: Chronic | ICD-10-CM

## 2024-08-05 DIAGNOSIS — J44.9 CHRONIC OBSTRUCTIVE PULMONARY DISEASE, UNSPECIFIED COPD TYPE (HCC): ICD-10-CM

## 2024-08-05 DIAGNOSIS — G89.29 CHRONIC BILATERAL LOW BACK PAIN WITHOUT SCIATICA: ICD-10-CM

## 2024-08-05 DIAGNOSIS — G89.29 CHRONIC NECK AND BACK PAIN: Chronic | ICD-10-CM

## 2024-08-05 PROCEDURE — 99213 OFFICE O/P EST LOW 20 MIN: CPT | Performed by: FAMILY MEDICINE

## 2024-08-05 PROCEDURE — 99396 PREV VISIT EST AGE 40-64: CPT | Performed by: FAMILY MEDICINE

## 2024-08-05 RX ORDER — HYDROCODONE BITARTRATE AND ACETAMINOPHEN 7.5; 325 MG/1; MG/1
1 TABLET ORAL EVERY 6 HOURS PRN
Qty: 90 TABLET | Refills: 0 | Status: SHIPPED | OUTPATIENT
Start: 2024-08-05

## 2024-08-05 NOTE — PROGRESS NOTES
"Adult Annual Physical  Name: Ana Hernandez      : 1970      MRN: [id number]  Encounter Provider: Boyd Barron DO  Encounter Date: 2024   Encounter department: [facility]    Assessment & Plan   1. Annual physical exam  2. Chronic neck and back pain    Immunizations and preventive care screenings were discussed with patient today. Appropriate education was printed on patient's after visit summary.    Counseling:  {Annual Physical; Counselin}         History of Present Illness   {Disappearing Hyperlinks I Encounters * My Last Note * Since Last Visit * History :27288}  Adult Annual Physical  Review of Systems  {Select to Display PMH (Optional):06037}    Objective   {Disappearing Hyperlinks   Review Vitals * Enter New Vitals * Results Review * Labs * Imaging * Cardiology * Procedures * Lung Cancer Screening :10333}  /80   Pulse 84   Temp 97.9 °F (36.6 °C)   Resp 20   Ht 5' 2\" (1.575 m)   Wt 58.1 kg (128 lb)   BMI 23.41 kg/m²     Physical Exam  {Administrative / Billing Section (Optional):19607}  "

## 2024-08-05 NOTE — PROGRESS NOTES
Adult Annual Physical  Name: Ana Hernandez      : 1970      MRN: [id number]  Encounter Provider: Boyd Barron DO  Encounter Date: 2024   Encounter department: [facility]    Assessment & Plan   1. Annual physical exam  2. Chronic neck and back pain  -     HYDROcodone-acetaminophen (NORCO) 7.5-325 mg per tablet; Take 1 tablet by mouth every 6 (six) hours as needed for pain Max Daily Amount: 3 tablets  The PDMP has been reviewed no issues found no evidence of diversion's or abuse.    Recurrent major depression treated with Remeron 45 mg at bedtime and Seroquel 100 at bedtime    The patient defers lung cancer screening breast cancer screening cervical cancer screening and colon cancer screening at this time    Chronic obstructive pulmonary disease the patient uses Trelegy as a maintenance inhaler and albuterol as a rescue inhaler    Tobacco use disorder the patient has a 40+ pack year history of smoking and has been a nicotine replacement failure is not a candidate for Wellbutrin or Chantix  Immunizations and preventive care screenings were discussed with patient today. Appropriate education was printed on patient's after visit summary.    Counseling:  Alcohol/drug use: discussed moderation in alcohol intake, the recommendations for healthy alcohol use, and avoidance of illicit drug use.  Dental Health: discussed importance of regular tooth brushing, flossing, and dental visits.  Injury prevention: discussed safety/seat belts, safety helmets, smoke detectors, carbon dioxide detectors, and smoking near bedding or upholstery.  Sexual health: discussed sexually transmitted diseases, partner selection, use of condoms, avoidance of unintended pregnancy, and contraceptive alternatives.  Exercise: the importance of regular exercise/physical activity was discussed. Recommend exercise 3-5 times per week for at least 30 minutes.          History of Present Illness     Adult Annual  "Physical:  Patient presents for annual physical.     Diet and Physical Activity:  - Diet/Nutrition: poor diet.  - Exercise: no formal exercise.    General Health:  - Sleep: sleeps well and 7-8 hours of sleep on average.  - Hearing: normal hearing bilateral ears.  - Vision: no vision problems.    /GYN Health:  - Follows with GYN: no.   - Menopause: postmenopausal.   - Last menstrual cycle: 8/17/1995.   - History of STDs: no  - Contraception: menopause.      Advanced Care Planning:  - Has an advanced directive?: no    - Has a durable medical POA?: no    - ACP document given to patient?: no      Review of Systems   Constitutional:  Negative for chills and fever.   HENT:  Negative for ear pain and sore throat.    Eyes:  Negative for pain and visual disturbance.   Respiratory:  Negative for cough and shortness of breath.    Cardiovascular:  Negative for chest pain and palpitations.   Gastrointestinal:  Negative for abdominal pain and vomiting.   Genitourinary:  Negative for dysuria and hematuria.   Musculoskeletal:  Negative for arthralgias and back pain.   Skin:  Negative for color change and rash.   Neurological:  Negative for seizures and syncope.   All other systems reviewed and are negative.        Objective     /80   Pulse 84   Temp 97.9 °F (36.6 °C)   Resp 20   Ht 5' 2\" (1.575 m)   Wt 58.1 kg (128 lb)   BMI 23.41 kg/m²     Physical Exam  Vitals and nursing note reviewed.   Constitutional:       General: She is not in acute distress.     Appearance: She is well-developed.   HENT:      Head: Normocephalic and atraumatic.      Right Ear: Tympanic membrane, ear canal and external ear normal.      Left Ear: Tympanic membrane, ear canal and external ear normal.      Nose: Nose normal.      Mouth/Throat:      Mouth: Mucous membranes are moist.      Pharynx: Oropharynx is clear.   Eyes:      Extraocular Movements: Extraocular movements intact.      Conjunctiva/sclera: Conjunctivae normal.      Pupils: Pupils " are equal, round, and reactive to light.   Cardiovascular:      Rate and Rhythm: Normal rate and regular rhythm.      Heart sounds: No murmur heard.  Pulmonary:      Effort: Pulmonary effort is normal. No respiratory distress.      Breath sounds: Normal breath sounds.   Abdominal:      General: Abdomen is flat. Bowel sounds are normal.      Palpations: Abdomen is soft.      Tenderness: There is no abdominal tenderness.   Musculoskeletal:         General: No swelling.      Cervical back: Normal range of motion and neck supple.   Skin:     General: Skin is warm and dry.      Capillary Refill: Capillary refill takes less than 2 seconds.   Neurological:      Mental Status: She is alert.   Psychiatric:         Mood and Affect: Mood normal.

## 2024-08-05 NOTE — PATIENT INSTRUCTIONS
"Patient Education     Routine physical for adults   The Basics   Written by the doctors and editors at [facility]   What is a physical? -- A physical is a routine visit, or \"check-up,\" with your doctor. You might also hear it called a \"wellness visit\" or \"preventive visit.\"  During each visit, the doctor will:   Ask about your physical and mental health   Ask about your habits, behaviors, and lifestyle   Do an exam   Give you vaccines if needed   Talk to you about any medicines you take   Give advice about your health   Answer your questions  Getting regular check-ups is an important part of taking care of your health. It can help your doctor find and treat any problems you have. But it's also important for preventing health problems.  A routine physical is different from a \"sick visit.\" A sick visit is when you see a doctor because of a health concern or problem. Since physicals are scheduled ahead of time, you can think about what you want to ask the doctor.  How often should I get a physical? -- It depends on your age and health. In general, for people age 21 years and older:   If you are younger than 50 years, you might be able to get a physical every 3 years.   If you are 50 years or older, your doctor might recommend a physical every year.  If you have an ongoing health condition, like diabetes or high blood pressure, your doctor will probably want to see you more often.  What happens during a physical? -- In general, each visit will include:   Physical exam - The doctor or nurse will check your height, weight, heart rate, and blood pressure. They will also look at your eyes and ears. They will ask about how you are feeling and whether you have any symptoms that bother you.   Medicines - It's a good idea to bring a list of all the medicines you take to each doctor visit. Your doctor will talk to you about your medicines and answer any questions. Tell them if you are having any side effects that bother you. You " "should also tell them if you are having trouble paying for any of your medicines.   Habits and behaviors - This includes:   Your diet   Your exercise habits   Whether you smoke, drink alcohol, or use drugs   Whether you are sexually active   Whether you feel safe at home  Your doctor will talk to you about things you can do to improve your health and lower your risk of health problems. They will also offer help and support. For example, if you want to quit smoking, they can give you advice and might prescribe medicines. If you want to improve your diet or get more physical activity, they can help you with this, too.   Lab tests, if needed - The tests you get will depend on your age and situation. For example, your doctor might want to check your:   Cholesterol   Blood sugar   Iron level   Vaccines - The recommended vaccines will depend on your age, health, and what vaccines you already had. Vaccines are very important because they can prevent certain serious or deadly infections.   Discussion of screening - \"Screening\" means checking for diseases or other health problems before they cause symptoms. Your doctor can recommend screening based on your age, risk, and preferences. This might include tests to check for:   Cancer, such as breast, prostate, cervical, ovarian, colorectal, prostate, lung, or skin cancer   Sexually transmitted infections, such as chlamydia and gonorrhea   Mental health conditions like depression and anxiety  Your doctor will talk to you about the different types of screening tests. They can help you decide which screenings to have. They can also explain what the results might mean.   Answering questions - The physical is a good time to ask the doctor or nurse questions about your health. If needed, they can refer you to other doctors or specialists, too.  Adults older than 65 years often need other care, too. As you get older, your doctor will talk to you about:   How to prevent falling at " home   Hearing or vision tests   Memory testing   How to take your medicines safely   Making sure that you have the help and support you need at home  All topics are updated as new evidence becomes available and our peer review process is complete.  This topic retrieved from [employer] on: May 02, 2024.  Topic 105189 Version 1.0  Release: 32.4.3 - C32.122  © 2024 UpToDate, Inc. and/or its affiliates. All rights reserved.  Consumer Information Use and Disclaimer   Disclaimer: This generalized information is a limited summary of diagnosis, treatment, and/or medication information. It is not meant to be comprehensive and should be used as a tool to help the user understand and/or assess potential diagnostic and treatment options. It does NOT include all information about conditions, treatments, medications, side effects, or risks that may apply to a specific patient. It is not intended to be medical advice or a substitute for the medical advice, diagnosis, or treatment of a health care provider based on the health care provider's examination and assessment of a patient's specific and unique circumstances. Patients must speak with a health care provider for complete information about their health, medical questions, and treatment options, including any risks or benefits regarding use of medications. This information does not endorse any treatments or medications as safe, effective, or approved for treating a specific patient. UpToDate, Inc. and its affiliates disclaim any warranty or liability relating to this information or the use thereof.The use of this information is governed by the Terms of Use, available at https://www.[employer]commerce[employer].com/en/know/clinical-effectiveness-terms. 2024© UpToDate, Inc. and its affiliates and/or licensors. All rights reserved.  Copyright   © 2024 UpToDate, Inc. and/or its affiliates. All rights reserved.

## 2024-08-09 DIAGNOSIS — F33.41 RECURRENT MAJOR DEPRESSIVE DISORDER, IN PARTIAL REMISSION (HCC): ICD-10-CM

## 2024-08-09 DIAGNOSIS — E78.2 MIXED HYPERLIPIDEMIA: ICD-10-CM

## 2024-08-09 NOTE — TELEPHONE ENCOUNTER
Reason for call:   [x] Refill   [] Prior Auth  [] Other:     Office:   [x] PCP/Provider -   [] Specialty/Provider -     Medication: atorvastatin 10 mg, take 1 tablet by mouth once daily                       Mirtazapine 45 mg , take 1 tablet by mouth at bedtime       Pharmacy: [facility] [place]     Does the patient have enough for 3 days?   [x] Yes   [] No - Send as HP to POD

## 2024-08-12 RX ORDER — ATORVASTATIN CALCIUM 10 MG/1
10 TABLET, FILM COATED ORAL DAILY
Qty: 30 TABLET | Refills: 5 | Status: SHIPPED | OUTPATIENT
Start: 2024-08-12

## 2024-08-12 RX ORDER — MIRTAZAPINE 45 MG/1
45 TABLET, FILM COATED ORAL
Qty: 30 TABLET | Refills: 5 | Status: SHIPPED | OUTPATIENT
Start: 2024-08-12

## 2024-08-14 ENCOUNTER — VBI (OUTPATIENT)
Dept: ADMINISTRATIVE | Facility: OTHER | Age: 54
End: 2024-08-14

## 2024-08-14 NOTE — TELEPHONE ENCOUNTER
08/14/24 1:16 PM     Chart reviewed for CRC: Colonoscopy ; nothing is submitted to the patient's insurance at this time.     Shanae Duenas   [facility]

## 2024-09-09 ENCOUNTER — OFFICE VISIT (OUTPATIENT)
Dept: FAMILY MEDICINE CLINIC | Facility: CLINIC | Age: 54
End: 2024-09-09
Payer: COMMERCIAL

## 2024-09-09 VITALS
HEART RATE: 68 BPM | HEIGHT: 62 IN | BODY MASS INDEX: 23.19 KG/M2 | WEIGHT: 126 LBS | DIASTOLIC BLOOD PRESSURE: 84 MMHG | SYSTOLIC BLOOD PRESSURE: 134 MMHG | TEMPERATURE: 97.7 F | RESPIRATION RATE: 16 BRPM

## 2024-09-09 DIAGNOSIS — M54.2 CHRONIC NECK AND BACK PAIN: ICD-10-CM

## 2024-09-09 DIAGNOSIS — J45.40 MODERATE PERSISTENT ASTHMA, UNSPECIFIED WHETHER COMPLICATED: ICD-10-CM

## 2024-09-09 DIAGNOSIS — G89.29 CHRONIC NECK AND BACK PAIN: ICD-10-CM

## 2024-09-09 DIAGNOSIS — G89.29 CHRONIC BILATERAL LOW BACK PAIN WITHOUT SCIATICA: ICD-10-CM

## 2024-09-09 DIAGNOSIS — M54.50 CHRONIC BILATERAL LOW BACK PAIN WITHOUT SCIATICA: ICD-10-CM

## 2024-09-09 DIAGNOSIS — F17.210 SMOKING GREATER THAN 20 PACK YEARS: ICD-10-CM

## 2024-09-09 DIAGNOSIS — J40 BRONCHITIS: Primary | ICD-10-CM

## 2024-09-09 DIAGNOSIS — J44.9 CHRONIC OBSTRUCTIVE PULMONARY DISEASE, UNSPECIFIED COPD TYPE (HCC): ICD-10-CM

## 2024-09-09 DIAGNOSIS — F17.200 TOBACCO USE DISORDER: ICD-10-CM

## 2024-09-09 DIAGNOSIS — M54.9 CHRONIC NECK AND BACK PAIN: ICD-10-CM

## 2024-09-09 PROCEDURE — 99214 OFFICE O/P EST MOD 30 MIN: CPT | Performed by: FAMILY MEDICINE

## 2024-09-09 RX ORDER — HYDROCODONE BITARTRATE AND ACETAMINOPHEN 7.5; 325 MG/1; MG/1
1 TABLET ORAL EVERY 6 HOURS PRN
Qty: 90 TABLET | Refills: 0 | Status: SHIPPED | OUTPATIENT
Start: 2024-09-09

## 2024-09-09 RX ORDER — DOXYCYCLINE HYCLATE 100 MG
100 TABLET ORAL 2 TIMES DAILY
Qty: 14 TABLET | Refills: 0 | Status: SHIPPED | OUTPATIENT
Start: 2024-09-09 | End: 2024-09-16

## 2024-09-09 RX ORDER — METHOCARBAMOL 750 MG/1
750 TABLET, FILM COATED ORAL 4 TIMES DAILY PRN
Qty: 120 TABLET | Refills: 2 | Status: SHIPPED | OUTPATIENT
Start: 2024-09-09

## 2024-09-09 NOTE — PROGRESS NOTES
Assessment/Plan: Acute bronchitis the patient is coughing up thick yellowish phlegm.  The patient has severe underlying COPD with an asthmatic component.  Will provide the patient with doxycycline 100 mg twice daily for 7 days.  The patient uses Trelegy as a maintenance inhaler and albuterol as a rescue inhaler.  The patient does continue to smoke    Chronic bilateral low back pain without sciatica and chronic neck pain hydrocodone and Robaxin are effective therapy the PDMP has been reviewed no issues found no evidence of divergence or abuse    Problem List Items Addressed This Visit       Chronic bilateral low back pain without sciatica    Tobacco use disorder     Other Visit Diagnoses       Bronchitis    -  Primary    Relevant Medications    fluticasone-umeclidinium-vilanterol (Trelegy Ellipta) 200-62.5-25 mcg/actuation AEPB inhaler    doxycycline hyclate (VIBRA-TABS) 100 mg tablet    Moderate persistent asthma, unspecified whether complicated        Relevant Medications    fluticasone-umeclidinium-vilanterol (Trelegy Ellipta) 200-62.5-25 mcg/actuation AEPB inhaler    Chronic obstructive pulmonary disease, unspecified COPD type (HCC)        Relevant Medications    fluticasone-umeclidinium-vilanterol (Trelegy Ellipta) 200-62.5-25 mcg/actuation AEPB inhaler    Chronic neck and back pain        Relevant Medications    methocarbamol (Robaxin-750) 750 mg tablet    HYDROcodone-acetaminophen (NORCO) 7.5-325 mg per tablet    Smoking greater than 20 pack years                 Diagnoses and all orders for this visit:    Bronchitis  -     doxycycline hyclate (VIBRA-TABS) 100 mg tablet; Take 1 tablet (100 mg total) by mouth 2 (two) times a day for 7 days    Moderate persistent asthma, unspecified whether complicated    Chronic obstructive pulmonary disease, unspecified COPD type (HCC)  -     fluticasone-umeclidinium-vilanterol (Trelegy Ellipta) 200-62.5-25 mcg/actuation AEPB inhaler; Inhale 1 puff daily Rinse mouth after  use.    Chronic neck and back pain  -     methocarbamol (Robaxin-750) 750 mg tablet; Take 1 tablet (750 mg total) by mouth 4 (four) times a day as needed for muscle spasms  -     HYDROcodone-acetaminophen (NORCO) 7.5-325 mg per tablet; Take 1 tablet by mouth every 6 (six) hours as needed for pain Max Daily Amount: 3 tablets    Chronic bilateral low back pain without sciatica    Tobacco use disorder    Smoking greater than 20 pack years        No problem-specific Assessment & Plan notes found for this encounter.      PHQ-2/9 Depression Screening              Body mass index is 23.05 kg/m².    BMI Counseling: Body mass index is 23.05 kg/m². The BMI     Subjective:      Patient ID: Ana Hernandez is a 54 y.o. female.    The patient presents for 1 month checkup with a chief complaint of cough productive of thick yellowish phlegm of weeks duration        The following portions of the patient's history were reviewed and updated as appropriate:   She has a past medical history of Allergies, Anxiety, Asthma, Bereavement, Bipolar disorder (HCC), Carpal tunnel syndrome, bilateral, Chronic bronchitis (HCC), Chronic constipation, Chronic low back pain, Chronic neck pain, COPD (chronic obstructive pulmonary disease) ([facility]), Costochondritis, DDD (degenerative disc disease), cervical, Degenerative joint disease of left hip, Depression, Diverticulitis, DJD (degenerative joint disease) of knee, DJD (degenerative joint disease), ankle and foot, Edema, Fatigue, GABE (generalized anxiety disorder), GERD (gastroesophageal reflux disease), Hernia, inguinal, Hernia, umbilical, Hyperlipidemia, Hypertension, IBS (irritable bowel syndrome), Insomnia, Lower GI bleed, Migraine, Neuropathy, PTSD (post-traumatic stress disorder), Raynaud disease, Scalp psoriasis, Situational depression, and Tobacco dependence.,  does not have any pertinent problems on file.,   has a past surgical history that includes Chest tube insertion; DXA procedure(historical)  (09/04/2009 & 8/24/2005); Mammo (historical) (02/14/2014); Hernia repair; and Cryotherapy.,  family history includes Anxiety disorder in her other; Arthritis in her other; Cancer in her other; Depression in her other; Diabetes in her other; Heart disease in her other; No Known Problems in her maternal uncle, paternal uncle, and paternal uncle; Pulmonary fibrosis in her maternal aunt; Rheum arthritis in her maternal aunt; Stomach cancer in her father.,   reports that she has been smoking cigarettes. She started smoking about 42 years ago. She has a 85.4 pack-year smoking history. She has been exposed to tobacco smoke. She has never used smokeless tobacco. She reports current alcohol use. She reports that she does not currently use drugs.,  is allergic to erythromycin..  Current Outpatient Medications   Medication Sig Dispense Refill    doxycycline hyclate (VIBRA-TABS) 100 mg tablet Take 1 tablet (100 mg total) by mouth 2 (two) times a day for 7 days 14 tablet 0    fluticasone-umeclidinium-vilanterol (Trelegy Ellipta) 200-62.5-25 mcg/actuation AEPB inhaler Inhale 1 puff daily Rinse mouth after use. 60 blister 5    HYDROcodone-acetaminophen (NORCO) 7.5-325 mg per tablet Take 1 tablet by mouth every 6 (six) hours as needed for pain Max Daily Amount: 3 tablets 90 tablet 0    methocarbamol (Robaxin-750) 750 mg tablet Take 1 tablet (750 mg total) by mouth 4 (four) times a day as needed for muscle spasms 120 tablet 2    albuterol (2.5 mg/3 mL) 0.083 % nebulizer solution Take 3 mL (2.5 mg total) by nebulization every 6 (six) hours as needed for wheezing or shortness of breath 180 mL 5    atorvastatin (LIPITOR) 10 mg tablet Take 1 tablet (10 mg total) by mouth daily 30 tablet 5    baclofen 10 mg tablet Take 1 tablet (10 mg total) by mouth 3 (three) times a day (Patient not taking: Reported on 12/18/2023) 90 tablet 4    benzonatate (TESSALON) 200 MG capsule Take 1 capsule (200 mg total) by mouth 3 (three) times a day as needed  for cough 30 capsule 2    Calcium Carbonate 1500 (600 Ca) MG TABS Take one tablet by mouth twice a day. 180 tablet 1    diclofenac (VOLTAREN) 75 mg EC tablet Take 1 tablet (75 mg total) by mouth 2 (two) times a day 60 tablet 5    hydrOXYzine pamoate (VISTARIL) 50 mg capsule take 1 capsule by mouth three times a day if needed for itching 30 capsule 5    Ketotifen Fumarate (ZADITOR) 0.035 % ophthalmic solution Administer 1 drop to both eyes 2 (two) times a day 5 mL 5    loratadine (CLARITIN) 10 mg tablet take 1 tablet by mouth once daily 100 tablet 1    mirtazapine (REMERON) 45 MG tablet Take 1 tablet (45 mg total) by mouth daily at bedtime 30 tablet 5    montelukast (SINGULAIR) 10 mg tablet Take 1 tablet (10 mg total) by mouth daily at bedtime 90 tablet 1    Multiple Vitamins-Minerals (MULTIVITAMIN ADULT PO) Take by mouth      nicotine (NICODERM CQ) 21 mg/24 hr TD 24 hr patch apply 1 patch to CLEAN, DRY, AND INTACT SKIN REMOVE AND REPLACE every 24 hours 28 patch 11    pyridoxine (VITAMIN B6) 50 mg tablet Take 1 tablet (50 mg total) by mouth daily 90 tablet 1    QUEtiapine (SEROquel) 100 mg tablet Take 1 tablet (100 mg total) by mouth daily at bedtime 90 tablet 1    topiramate (TOPAMAX) 50 MG tablet Take 50 mg by mouth 2 (two) times a day (Patient not taking: Reported on 3/11/2022)      triamcinolone (KENALOG) 0.1 % cream Apply topically 2 (two) times a day (Patient not taking: Reported on 9/9/2024) 30 g 0    TURMERIC PO Take 1 tablet by mouth       No current facility-administered medications for this visit.       Review of Systems   Constitutional:  Negative for chills and fever.   HENT:  Negative for ear pain and sore throat.    Eyes:  Negative for pain and visual disturbance.   Respiratory:  Positive for cough, shortness of breath and wheezing.    Cardiovascular:  Negative for chest pain and palpitations.   Gastrointestinal:  Negative for abdominal pain and vomiting.   Genitourinary:  Negative for dysuria and  "hematuria.   Musculoskeletal:  Negative for arthralgias and back pain.   Skin:  Negative for color change and rash.   Neurological:  Negative for seizures and syncope.   All other systems reviewed and are negative.        Objective:    /84   Pulse 68   Temp 97.7 °F (36.5 °C)   Resp 16   Ht 5' 2\" (1.575 m)   Wt 57.2 kg (126 lb)   BMI 23.05 kg/m²   Body mass index is 23.05 kg/m².     Physical Exam  Constitutional:       Appearance: Normal appearance. She is well-developed.   HENT:      Head: Normocephalic and atraumatic.      Right Ear: Tympanic membrane, ear canal and external ear normal.      Left Ear: Tympanic membrane, ear canal and external ear normal.      Nose: Nose normal.      Mouth/Throat:      Mouth: Mucous membranes are moist.      Pharynx: Oropharynx is clear.   Eyes:      Extraocular Movements: Extraocular movements intact.      Conjunctiva/sclera: Conjunctivae normal.      Pupils: Pupils are equal, round, and reactive to light.   Cardiovascular:      Rate and Rhythm: Normal rate and regular rhythm.      Pulses: Normal pulses.      Heart sounds: Normal heart sounds.   Pulmonary:      Effort: Pulmonary effort is normal.      Breath sounds: Normal breath sounds.   Abdominal:      General: Abdomen is flat. Bowel sounds are normal.      Palpations: Abdomen is soft.      Tenderness: There is no abdominal tenderness.   Musculoskeletal:         General: Normal range of motion.      Cervical back: Normal range of motion and neck supple.      Comments: Decreased range of motion of the cervical spine with multiple trigger points lumbar paraspinal spasm with bilateral SI tenderness   Skin:     General: Skin is warm and dry.      Capillary Refill: Capillary refill takes less than 2 seconds.   Neurological:      General: No focal deficit present.      Mental Status: She is alert and oriented to person, place, and time.   Psychiatric:         Mood and Affect: Mood normal.         Behavior: Behavior normal. "         Thought Content: Thought content normal.         Judgment: Judgment normal.

## 2024-09-22 DIAGNOSIS — J44.9 CHRONIC OBSTRUCTIVE PULMONARY DISEASE, UNSPECIFIED COPD TYPE (HCC): ICD-10-CM

## 2024-09-23 RX ORDER — FLUTICASONE FUROATE, UMECLIDINIUM BROMIDE AND VILANTEROL TRIFENATATE 200; 62.5; 25 UG/1; UG/1; UG/1
POWDER RESPIRATORY (INHALATION)
Qty: 180 BLISTER | Refills: 5 | Status: SHIPPED | OUTPATIENT
Start: 2024-09-23

## 2024-09-26 DIAGNOSIS — F33.41 RECURRENT MAJOR DEPRESSIVE DISORDER, IN PARTIAL REMISSION (HCC): ICD-10-CM

## 2024-09-26 RX ORDER — MIRTAZAPINE 45 MG/1
45 TABLET, FILM COATED ORAL
Qty: 90 TABLET | Refills: 5 | Status: SHIPPED | OUTPATIENT
Start: 2024-09-26

## 2024-10-02 DIAGNOSIS — E78.2 MIXED HYPERLIPIDEMIA: ICD-10-CM

## 2024-10-03 RX ORDER — ATORVASTATIN CALCIUM 10 MG/1
10 TABLET, FILM COATED ORAL DAILY
Qty: 90 TABLET | Refills: 1 | Status: SHIPPED | OUTPATIENT
Start: 2024-10-03

## 2024-10-04 ENCOUNTER — OFFICE VISIT (OUTPATIENT)
Dept: FAMILY MEDICINE CLINIC | Facility: CLINIC | Age: 54
End: 2024-10-04
Payer: COMMERCIAL

## 2024-10-04 VITALS
DIASTOLIC BLOOD PRESSURE: 68 MMHG | TEMPERATURE: 98.4 F | HEIGHT: 62 IN | WEIGHT: 126 LBS | RESPIRATION RATE: 16 BRPM | BODY MASS INDEX: 23.19 KG/M2 | SYSTOLIC BLOOD PRESSURE: 116 MMHG | HEART RATE: 76 BPM

## 2024-10-04 DIAGNOSIS — M54.9 CHRONIC NECK AND BACK PAIN: ICD-10-CM

## 2024-10-04 DIAGNOSIS — M54.2 CHRONIC NECK AND BACK PAIN: ICD-10-CM

## 2024-10-04 DIAGNOSIS — R11.2 NAUSEA AND VOMITING, UNSPECIFIED VOMITING TYPE: ICD-10-CM

## 2024-10-04 DIAGNOSIS — Z88.9 HISTORY OF SEASONAL ALLERGIES: ICD-10-CM

## 2024-10-04 DIAGNOSIS — J40 BRONCHITIS: Primary | ICD-10-CM

## 2024-10-04 DIAGNOSIS — G89.29 CHRONIC NECK AND BACK PAIN: ICD-10-CM

## 2024-10-04 DIAGNOSIS — J45.40 MODERATE PERSISTENT ASTHMA, UNSPECIFIED WHETHER COMPLICATED: ICD-10-CM

## 2024-10-04 PROCEDURE — 99214 OFFICE O/P EST MOD 30 MIN: CPT | Performed by: FAMILY MEDICINE

## 2024-10-04 RX ORDER — HYDROCODONE BITARTRATE AND ACETAMINOPHEN 7.5; 325 MG/1; MG/1
1 TABLET ORAL EVERY 6 HOURS PRN
Qty: 90 TABLET | Refills: 0 | Status: SHIPPED | OUTPATIENT
Start: 2024-10-07

## 2024-10-04 RX ORDER — BENZONATATE 200 MG/1
200 CAPSULE ORAL 3 TIMES DAILY PRN
Qty: 20 CAPSULE | Refills: 0 | Status: SHIPPED | OUTPATIENT
Start: 2024-10-04

## 2024-10-04 RX ORDER — DOXYCYCLINE HYCLATE 100 MG
100 TABLET ORAL 2 TIMES DAILY
Qty: 28 TABLET | Refills: 0 | Status: SHIPPED | OUTPATIENT
Start: 2024-10-04 | End: 2024-10-18

## 2024-10-04 RX ORDER — ONDANSETRON 4 MG/1
4 TABLET, FILM COATED ORAL EVERY 8 HOURS PRN
Qty: 20 TABLET | Refills: 0 | Status: SHIPPED | OUTPATIENT
Start: 2024-10-04

## 2024-10-04 RX ORDER — METHYLPREDNISOLONE 4 MG
TABLET, DOSE PACK ORAL
Qty: 21 EACH | Refills: 0 | Status: SHIPPED | OUTPATIENT
Start: 2024-10-04

## 2024-10-04 NOTE — PROGRESS NOTES
Ambulatory Visit  Name: Ana Hernandez      : 1970      MRN: [id number]  Encounter Provider: Boyd Barron DO  Encounter Date: 10/4/2024   Encounter department: [facility]    Assessment & Plan  Bronchitis  Will provide doxycycline 100 mg twice daily, and Medrol Dosepak, and Tessalon Perles 200 up to 3 times daily as needed.  Orders:    methylPREDNISolone 4 MG tablet therapy pack; Use as directed on package    doxycycline hyclate (VIBRA-TABS) 100 mg tablet; Take 1 tablet (100 mg total) by mouth 2 (two) times a day for 14 days    benzonatate (TESSALON) 200 MG capsule; Take 1 capsule (200 mg total) by mouth 3 (three) times a day as needed for cough    Moderate persistent asthma, unspecified whether complicated  Trelegy is used as a maintenance inhaler albuterol as a rescue inhaler       History of seasonal allergies  Currently taking Singulair Claritin and Zaditor drops       Chronic neck and back pain  Hydrocodone is effective therapy the PDMP has been reviewed no issues found no evidence of diversion's or abuse  Orders:    HYDROcodone-acetaminophen (NORCO) 7.5-325 mg per tablet; Take 1 tablet by mouth every 6 (six) hours as needed for pain Max Daily Amount: 3 tablets Do not start before 2024.    Nausea and vomiting, unspecified vomiting type  Secondary to mucus drainage will provide Zofran 4 mg to use up to 4 times daily  Orders:    ondansetron (ZOFRAN) 4 mg tablet; Take 1 tablet (4 mg total) by mouth every 8 (eight) hours as needed for nausea or vomiting       History of Present Illness     Patient presents with chief complaint of cold-like symptoms for weeks duration with a mild sore throat cough productive of yellowish phlegm mucus with nausea and vomiting in the morning        History obtained from : patient  Review of Systems   Constitutional:  Negative for chills and fever.   HENT:  Positive for sore throat. Negative for ear pain.    Eyes:  Negative for pain  "and visual disturbance.   Respiratory:  Positive for cough and wheezing. Negative for shortness of breath.    Cardiovascular:  Negative for chest pain and palpitations.   Gastrointestinal:  Positive for nausea and vomiting. Negative for abdominal pain.   Genitourinary:  Negative for dysuria and hematuria.   Musculoskeletal:  Negative for arthralgias and back pain.   Skin:  Negative for color change and rash.   Neurological:  Negative for seizures and syncope.   All other systems reviewed and are negative.          Objective     /68   Pulse 76   Temp 98.4 °F (36.9 °C)   Resp 16   Ht 5' 2\" (1.575 m)   Wt 57.2 kg (126 lb)   BMI 23.05 kg/m²     Physical Exam  Constitutional:       Appearance: Normal appearance.   HENT:      Head: Normocephalic and atraumatic.      Right Ear: Tympanic membrane, ear canal and external ear normal.      Left Ear: Tympanic membrane, ear canal and external ear normal.      Nose: Nose normal.      Mouth/Throat:      Mouth: Mucous membranes are moist.      Pharynx: Oropharynx is clear.   Eyes:      Extraocular Movements: Extraocular movements intact.      Conjunctiva/sclera: Conjunctivae normal.      Pupils: Pupils are equal, round, and reactive to light.   Cardiovascular:      Rate and Rhythm: Normal rate and regular rhythm.      Pulses: Normal pulses.      Heart sounds: Normal heart sounds.   Pulmonary:      Effort: Pulmonary effort is normal.      Breath sounds: Rhonchi present.   Abdominal:      General: Abdomen is flat. Bowel sounds are normal.      Palpations: Abdomen is soft.   Musculoskeletal:         General: Normal range of motion.      Cervical back: Normal range of motion and neck supple.      Comments: Decreased range of motion of the cervical and lumbar spine with paraspinal spasm and multiple trigger points in the cervical and lumbar spine   Skin:     General: Skin is warm and dry.   Neurological:      General: No focal deficit present.      Mental Status: She is " alert and oriented to person, place, and time.   Psychiatric:         Mood and Affect: Mood normal.         Behavior: Behavior normal.

## 2024-10-24 DIAGNOSIS — T78.40XD ALLERGY, SUBSEQUENT ENCOUNTER: ICD-10-CM

## 2024-10-24 RX ORDER — NICOTINE POLACRILEX 2 MG
LOZENGE BUCCAL
Qty: 5 ML | Refills: 5 | Status: SHIPPED | OUTPATIENT
Start: 2024-10-24

## 2024-11-07 ENCOUNTER — OFFICE VISIT (OUTPATIENT)
Dept: FAMILY MEDICINE CLINIC | Facility: CLINIC | Age: 54
End: 2024-11-07
Payer: COMMERCIAL

## 2024-11-07 VITALS
TEMPERATURE: 97.9 F | SYSTOLIC BLOOD PRESSURE: 134 MMHG | RESPIRATION RATE: 16 BRPM | WEIGHT: 131 LBS | BODY MASS INDEX: 24.11 KG/M2 | HEIGHT: 62 IN | HEART RATE: 76 BPM | DIASTOLIC BLOOD PRESSURE: 80 MMHG

## 2024-11-07 DIAGNOSIS — G89.29 CHRONIC BILATERAL LOW BACK PAIN WITHOUT SCIATICA: ICD-10-CM

## 2024-11-07 DIAGNOSIS — L30.9 DERMATITIS: ICD-10-CM

## 2024-11-07 DIAGNOSIS — F33.41 RECURRENT MAJOR DEPRESSIVE DISORDER, IN PARTIAL REMISSION (HCC): ICD-10-CM

## 2024-11-07 DIAGNOSIS — J40 BRONCHITIS: ICD-10-CM

## 2024-11-07 DIAGNOSIS — G89.29 CHRONIC NECK AND BACK PAIN: Primary | ICD-10-CM

## 2024-11-07 DIAGNOSIS — M54.50 CHRONIC BILATERAL LOW BACK PAIN WITHOUT SCIATICA: ICD-10-CM

## 2024-11-07 DIAGNOSIS — M54.2 CHRONIC NECK AND BACK PAIN: Primary | ICD-10-CM

## 2024-11-07 DIAGNOSIS — M54.9 CHRONIC NECK AND BACK PAIN: Primary | ICD-10-CM

## 2024-11-07 PROCEDURE — 99213 OFFICE O/P EST LOW 20 MIN: CPT | Performed by: FAMILY MEDICINE

## 2024-11-07 RX ORDER — QUETIAPINE FUMARATE 100 MG/1
100 TABLET, FILM COATED ORAL
Qty: 90 TABLET | Refills: 1 | Status: SHIPPED | OUTPATIENT
Start: 2024-11-07

## 2024-11-07 RX ORDER — BENZONATATE 200 MG/1
200 CAPSULE ORAL 3 TIMES DAILY PRN
Qty: 20 CAPSULE | Refills: 2 | Status: SHIPPED | OUTPATIENT
Start: 2024-11-07

## 2024-11-07 RX ORDER — HYDROCODONE BITARTRATE AND ACETAMINOPHEN 7.5; 325 MG/1; MG/1
1 TABLET ORAL EVERY 6 HOURS PRN
Qty: 90 TABLET | Refills: 0 | Status: SHIPPED | OUTPATIENT
Start: 2024-11-07

## 2024-11-07 RX ORDER — TRIAMCINOLONE ACETONIDE 1 MG/G
CREAM TOPICAL 2 TIMES DAILY
Qty: 80 G | Refills: 1 | Status: SHIPPED | OUTPATIENT
Start: 2024-11-07

## 2024-11-07 NOTE — ASSESSMENT & PLAN NOTE
Hydrocodone is effective therapy.  The PDMP has been reviewed no issues found no evidence of divergence or abuse      Orders:    Calcium Carbonate 1500 (600 Ca) MG TABS; Take one tablet by mouth twice a day.

## 2024-11-07 NOTE — PROGRESS NOTES
Ambulatory Visit  Name: Ana Hernandez      : 1970      MRN: [id number]  Encounter Provider: Boyd Barron DO  Encounter Date: 2024   Encounter department: [facility]    Assessment & Plan  Chronic neck and back pain    Orders:    HYDROcodone-acetaminophen (NORCO) 7.5-325 mg per tablet; Take 1 tablet by mouth every 6 (six) hours as needed for pain Max Daily Amount: 3 tablets    Chronic bilateral low back pain without sciatica  Hydrocodone is effective therapy.  The PDMP has been reviewed no issues found no evidence of divergence or abuse      Orders:    Calcium Carbonate 1500 (600 Ca) MG TABS; Take one tablet by mouth twice a day.    Recurrent major depressive disorder, in partial remission (HCC)  Seroquel is effective therapy    Orders:    QUEtiapine (SEROquel) 100 mg tablet; Take 1 tablet (100 mg total) by mouth daily at bedtime    Bronchitis    Orders:    benzonatate (TESSALON) 200 MG capsule; Take 1 capsule (200 mg total) by mouth 3 (three) times a day as needed for cough    Dermatitis  Will prescribe Kenalog to be used twice daily  Orders:    triamcinolone (KENALOG) 0.1 % cream; Apply topically 2 (two) times a day       History of Present Illness     Patient presents for 1 month checkup on chronic back pain and depression          Review of Systems   Constitutional:  Negative for chills and fever.   HENT:  Negative for ear pain and sore throat.    Eyes:  Negative for pain and visual disturbance.   Respiratory:  Negative for cough and shortness of breath.    Cardiovascular:  Negative for chest pain and palpitations.   Gastrointestinal:  Negative for abdominal pain and vomiting.   Genitourinary:  Negative for dysuria and hematuria.   Musculoskeletal:  Positive for back pain and neck pain. Negative for arthralgias.   Skin:  Negative for color change and rash.   Neurological:  Negative for seizures and syncope.   Psychiatric/Behavioral:  Positive for dysphoric mood.   "  All other systems reviewed and are negative.          Objective     /80   Pulse 76   Temp 97.9 °F (36.6 °C)   Resp 16   Ht 5' 2\" (1.575 m)   Wt 59.4 kg (131 lb)   BMI 23.96 kg/m²     Physical Exam  Constitutional:       Appearance: Normal appearance.   HENT:      Head: Normocephalic and atraumatic.      Right Ear: Tympanic membrane, ear canal and external ear normal.      Left Ear: Tympanic membrane, ear canal and external ear normal.      Nose: Nose normal.      Mouth/Throat:      Mouth: Mucous membranes are moist.      Pharynx: Oropharynx is clear.   Cardiovascular:      Rate and Rhythm: Normal rate and regular rhythm.      Pulses: Normal pulses.      Heart sounds: Normal heart sounds.   Pulmonary:      Effort: Pulmonary effort is normal.      Breath sounds: Normal breath sounds.   Abdominal:      General: Abdomen is flat. Bowel sounds are normal.      Palpations: Abdomen is soft.   Musculoskeletal:         General: Normal range of motion.      Cervical back: Normal range of motion and neck supple.      Comments: Decreased range of motion of the cervical spine with paraspinal tenderness paraspinal spasm of the lumbar spine with bilateral SI tenderness   Skin:     General: Skin is warm and dry.   Neurological:      General: No focal deficit present.      Mental Status: She is alert and oriented to person, place, and time.   Psychiatric:         Mood and Affect: Mood normal.         Behavior: Behavior normal.         "

## 2024-12-06 ENCOUNTER — OFFICE VISIT (OUTPATIENT)
Dept: FAMILY MEDICINE CLINIC | Facility: CLINIC | Age: 54
End: 2024-12-06
Payer: COMMERCIAL

## 2024-12-06 VITALS
HEART RATE: 94 BPM | HEIGHT: 62 IN | OXYGEN SATURATION: 99 % | SYSTOLIC BLOOD PRESSURE: 118 MMHG | WEIGHT: 132 LBS | TEMPERATURE: 97.8 F | BODY MASS INDEX: 24.29 KG/M2 | DIASTOLIC BLOOD PRESSURE: 76 MMHG

## 2024-12-06 DIAGNOSIS — J40 BRONCHITIS: ICD-10-CM

## 2024-12-06 DIAGNOSIS — M54.50 CHRONIC BILATERAL LOW BACK PAIN WITHOUT SCIATICA: ICD-10-CM

## 2024-12-06 DIAGNOSIS — M54.2 CHRONIC NECK AND BACK PAIN: ICD-10-CM

## 2024-12-06 DIAGNOSIS — F17.200 TOBACCO USE DISORDER: ICD-10-CM

## 2024-12-06 DIAGNOSIS — J01.01 ACUTE RECURRENT MAXILLARY SINUSITIS: Primary | ICD-10-CM

## 2024-12-06 DIAGNOSIS — M54.9 CHRONIC NECK AND BACK PAIN: ICD-10-CM

## 2024-12-06 DIAGNOSIS — F33.41 RECURRENT MAJOR DEPRESSIVE DISORDER, IN PARTIAL REMISSION (HCC): ICD-10-CM

## 2024-12-06 DIAGNOSIS — F41.9 ANXIETY: ICD-10-CM

## 2024-12-06 DIAGNOSIS — J44.9 CHRONIC OBSTRUCTIVE PULMONARY DISEASE, UNSPECIFIED COPD TYPE (HCC): ICD-10-CM

## 2024-12-06 DIAGNOSIS — G89.29 CHRONIC BILATERAL LOW BACK PAIN WITHOUT SCIATICA: ICD-10-CM

## 2024-12-06 DIAGNOSIS — J30.89 ENVIRONMENTAL AND SEASONAL ALLERGIES: ICD-10-CM

## 2024-12-06 DIAGNOSIS — G89.29 CHRONIC NECK AND BACK PAIN: ICD-10-CM

## 2024-12-06 PROCEDURE — 99214 OFFICE O/P EST MOD 30 MIN: CPT | Performed by: FAMILY MEDICINE

## 2024-12-06 RX ORDER — MONTELUKAST SODIUM 10 MG/1
10 TABLET ORAL
Qty: 90 TABLET | Refills: 1 | Status: SHIPPED | OUTPATIENT
Start: 2024-12-06

## 2024-12-06 RX ORDER — HYDROXYZINE PAMOATE 50 MG/1
50 CAPSULE ORAL 3 TIMES DAILY PRN
Qty: 30 CAPSULE | Refills: 5 | Status: SHIPPED | OUTPATIENT
Start: 2024-12-06

## 2024-12-06 RX ORDER — QUETIAPINE FUMARATE 100 MG/1
100 TABLET, FILM COATED ORAL
Qty: 90 TABLET | Refills: 1 | Status: SHIPPED | OUTPATIENT
Start: 2024-12-06

## 2024-12-06 RX ORDER — HYDROCODONE BITARTRATE AND ACETAMINOPHEN 7.5; 325 MG/1; MG/1
1 TABLET ORAL EVERY 6 HOURS PRN
Qty: 90 TABLET | Refills: 0 | Status: SHIPPED | OUTPATIENT
Start: 2024-12-06

## 2024-12-06 RX ORDER — DEXTROMETHORPHAN HYDROBROMIDE AND PROMETHAZINE HYDROCHLORIDE 15; 6.25 MG/5ML; MG/5ML
5 SYRUP ORAL 4 TIMES DAILY PRN
Qty: 180 ML | Refills: 0 | Status: SHIPPED | OUTPATIENT
Start: 2024-12-06

## 2024-12-06 RX ORDER — DOXYCYCLINE HYCLATE 100 MG
100 TABLET ORAL 2 TIMES DAILY
Qty: 28 TABLET | Refills: 0 | Status: SHIPPED | OUTPATIENT
Start: 2024-12-06 | End: 2024-12-20

## 2024-12-06 RX ORDER — METHYLPREDNISOLONE 4 MG/1
TABLET ORAL
Qty: 21 EACH | Refills: 0 | Status: SHIPPED | OUTPATIENT
Start: 2024-12-06

## 2024-12-06 NOTE — PROGRESS NOTES
Name: Ana Hernandez      : 1970      MRN: [id number]  Encounter Provider: Boyd Barron DO  Encounter Date: 2024   Encounter department: [facility]  :  Assessment & Plan  Acute recurrent maxillary sinusitis  We will treat with doxycycline 100 mg twice daily, Medrol Dosepak, and Promethazine DM       Chronic obstructive pulmonary disease, unspecified COPD type (HCC)  Patient uses Trelegy as a maintenance inhaler and albuterol as a rescue inhaler       Bronchitis  Promethazine DM will be provided for cough suppression       Tobacco use disorder  The patient continues to smoke has been a failure on nicotine replacement therapy and is not a candidate for Chantix or Wellbutrin       Chronic bilateral low back pain without sciatica  Norco is effective therapy the PDMP was unable to be reviewed.  There is no evidence of divergence or abuse       Chronic neck and back pain         Recurrent major depressive disorder, in partial remission (HCC)  Seroquel 100 mg improved function         Environmental and seasonal allergies  Claritin is effective therapy       Anxiety  Vistaril 50 mg up to 3 times daily as needed is effective therapy             Depression Screening and Follow-up Plan: Patient was screened for depression during today's encounter. They screened negative with a PHQ-2 score of 2.    Tobacco Cessation Counseling: Tobacco cessation counseling was provided. The patient is sincerely urged to quit consumption of tobacco. She is not ready to quit tobacco. Medication options and side effects of medication discussed. Patient refused medication. The patient is not a candidate for Wellbutrin or Chantix and has been a nicotine replacement failure      History of Present Illness     Patient presents with a chief complaint of cough productive of yellowish phlegm sinus congestion and pressure for approximately a weeks duration      Review of Systems   Constitutional:  Negative  "for chills and fever.   HENT:  Positive for ear pain, postnasal drip, rhinorrhea, sinus pressure and sinus pain. Negative for sore throat.    Eyes:  Negative for pain and visual disturbance.   Respiratory:  Positive for cough. Negative for shortness of breath.    Cardiovascular:  Negative for chest pain and palpitations.   Gastrointestinal:  Negative for abdominal pain and vomiting.   Genitourinary:  Negative for dysuria and hematuria.   Musculoskeletal:  Negative for arthralgias and back pain.   Skin:  Negative for color change and rash.   Neurological:  Negative for seizures and syncope.   All other systems reviewed and are negative.         Objective   /76 (BP Location: Left arm, Patient Position: Sitting)   Pulse 94   Temp 97.8 °F (36.6 °C)   Ht 5' 2\" (1.575 m)   Wt 59.9 kg (132 lb)   SpO2 99%   BMI 24.14 kg/m²      Physical Exam  Constitutional:       Appearance: Normal appearance.   HENT:      Head: Normocephalic and atraumatic.      Right Ear: Ear canal and external ear normal. Tympanic membrane is bulging.      Left Ear: Ear canal and external ear normal. Tympanic membrane is bulging.      Nose: Congestion and rhinorrhea present.      Right Sinus: Maxillary sinus tenderness present.      Left Sinus: Maxillary sinus tenderness present.      Mouth/Throat:      Mouth: Mucous membranes are moist.      Pharynx: Oropharynx is clear.   Cardiovascular:      Rate and Rhythm: Normal rate and regular rhythm.      Pulses: Normal pulses.      Heart sounds: Normal heart sounds.   Pulmonary:      Effort: Pulmonary effort is normal.      Breath sounds: Normal breath sounds.   Abdominal:      General: Abdomen is flat. Bowel sounds are normal.      Palpations: Abdomen is soft.   Musculoskeletal:         General: Normal range of motion.      Cervical back: Normal range of motion and neck supple.   Skin:     General: Skin is warm and dry.   Neurological:      General: No focal deficit present.      Mental Status: " She is alert and oriented to person, place, and time.   Psychiatric:         Mood and Affect: Mood normal.         Behavior: Behavior normal.

## 2024-12-06 NOTE — ASSESSMENT & PLAN NOTE
The patient continues to smoke has been a failure on nicotine replacement therapy and is not a candidate for Chantix or Wellbutrin

## 2024-12-06 NOTE — ASSESSMENT & PLAN NOTE
Norco is effective therapy the PDMP was unable to be reviewed.  There is no evidence of divergence or abuse

## 2025-01-06 ENCOUNTER — OFFICE VISIT (OUTPATIENT)
Dept: FAMILY MEDICINE CLINIC | Facility: CLINIC | Age: 55
End: 2025-01-06
Payer: COMMERCIAL

## 2025-01-06 VITALS
WEIGHT: 131 LBS | RESPIRATION RATE: 16 BRPM | DIASTOLIC BLOOD PRESSURE: 68 MMHG | TEMPERATURE: 96.3 F | BODY MASS INDEX: 24.11 KG/M2 | HEIGHT: 62 IN | HEART RATE: 76 BPM | SYSTOLIC BLOOD PRESSURE: 130 MMHG

## 2025-01-06 DIAGNOSIS — M54.9 CHRONIC NECK AND BACK PAIN: ICD-10-CM

## 2025-01-06 DIAGNOSIS — M54.2 CHRONIC NECK AND BACK PAIN: ICD-10-CM

## 2025-01-06 DIAGNOSIS — J44.9 CHRONIC OBSTRUCTIVE PULMONARY DISEASE, UNSPECIFIED COPD TYPE (HCC): ICD-10-CM

## 2025-01-06 DIAGNOSIS — J45.40 MODERATE PERSISTENT ASTHMA, UNSPECIFIED WHETHER COMPLICATED: ICD-10-CM

## 2025-01-06 DIAGNOSIS — G89.29 CHRONIC NECK AND BACK PAIN: ICD-10-CM

## 2025-01-06 DIAGNOSIS — J40 BRONCHITIS: ICD-10-CM

## 2025-01-06 DIAGNOSIS — J01.01 ACUTE RECURRENT MAXILLARY SINUSITIS: Primary | ICD-10-CM

## 2025-01-06 PROCEDURE — 99214 OFFICE O/P EST MOD 30 MIN: CPT | Performed by: FAMILY MEDICINE

## 2025-01-06 RX ORDER — HYDROCODONE BITARTRATE AND ACETAMINOPHEN 7.5; 325 MG/1; MG/1
1 TABLET ORAL EVERY 6 HOURS PRN
Qty: 90 TABLET | Refills: 0 | Status: SHIPPED | OUTPATIENT
Start: 2025-01-06

## 2025-01-06 RX ORDER — DOXYCYCLINE HYCLATE 100 MG
100 TABLET ORAL 2 TIMES DAILY
Qty: 28 TABLET | Refills: 0 | Status: SHIPPED | OUTPATIENT
Start: 2025-01-06 | End: 2025-01-20

## 2025-01-06 RX ORDER — METHYLPREDNISOLONE 4 MG/1
TABLET ORAL
Qty: 21 EACH | Refills: 0 | Status: SHIPPED | OUTPATIENT
Start: 2025-01-06

## 2025-01-06 RX ORDER — BENZONATATE 200 MG/1
200 CAPSULE ORAL 3 TIMES DAILY PRN
Qty: 20 CAPSULE | Refills: 2 | Status: SHIPPED | OUTPATIENT
Start: 2025-01-06

## 2025-01-06 NOTE — PROGRESS NOTES
Name: Ana Hernandez      : 1970      MRN: [id number]  Encounter Provider: Boyd Barron DO  Encounter Date: 2025   Encounter department: [facility] PRIMARY CARE  :  Assessment & Plan  Acute recurrent maxillary sinusitis  We will provide a Medrol Dosepak and doxycycline 100 mg twice daily for 2 weeks  Orders:    methylPREDNISolone 4 MG tablet therapy pack; Use as directed on package    doxycycline hyclate (VIBRA-TABS) 100 mg tablet; Take 1 tablet (100 mg total) by mouth 2 (two) times a day for 14 days    CBC and differential; Future    Comprehensive metabolic panel; Future    Bronchitis  We will provide Tessalon Perles and treat as above  Orders:    benzonatate (TESSALON) 200 MG capsule; Take 1 capsule (200 mg total) by mouth 3 (three) times a day as needed for cough    methylPREDNISolone 4 MG tablet therapy pack; Use as directed on package    doxycycline hyclate (VIBRA-TABS) 100 mg tablet; Take 1 tablet (100 mg total) by mouth 2 (two) times a day for 14 days    CBC and differential; Future    Comprehensive metabolic panel; Future    Mycoplasma Pneumoniae AB, IgG/IgM; Future    Sputum culture and Gram stain; Future    Chronic obstructive pulmonary disease, unspecified COPD type (HCC)  Patient uses Trelegy as a maintenance inhaler and albuterol as a rescue inhaler       Moderate persistent asthma, unspecified whether complicated         Chronic neck and back pain  Opioid therapy along with diclofenac 75 mg up to 2 times daily is effective therapy the PDMP reviewed no issues found no evidence of divergence or abuse  Orders:    HYDROcodone-acetaminophen (NORCO) 7.5-325 mg per tablet; Take 1 tablet by mouth every 6 (six) hours as needed for pain Max Daily Amount: 3 tablets          Depression Screening and Follow-up Plan:   Patient's depression screening was negative with an Erhard  Depression Scale score of  .     History of Present Illness     Patient presents with a chief  "complaint of chest congestion and coughing of yellowish phlegm ongoing for approximately a month      Review of Systems   Constitutional:  Negative for chills and fever.   HENT:  Positive for rhinorrhea, sinus pressure and sinus pain. Negative for ear pain and sore throat.    Eyes:  Negative for pain and visual disturbance.   Respiratory:  Positive for choking, shortness of breath and wheezing. Negative for cough.    Cardiovascular:  Negative for chest pain and palpitations.   Gastrointestinal:  Negative for abdominal pain and vomiting.   Genitourinary:  Negative for dysuria and hematuria.   Musculoskeletal:  Positive for back pain and neck pain. Negative for arthralgias.   Skin:  Negative for color change and rash.   Neurological:  Negative for seizures and syncope.   All other systems reviewed and are negative.      Objective   /68   Pulse 76   Temp (!) 96.3 °F (35.7 °C)   Resp 16   Ht 5' 2\" (1.575 m)   Wt 59.4 kg (131 lb)   BMI 23.96 kg/m²      Physical Exam  Constitutional:       Appearance: Normal appearance.   HENT:      Head: Normocephalic and atraumatic.      Right Ear: Tympanic membrane, ear canal and external ear normal.      Left Ear: Tympanic membrane, ear canal and external ear normal.      Nose:      Right Sinus: Maxillary sinus tenderness present.      Left Sinus: Maxillary sinus tenderness present.      Mouth/Throat:      Mouth: Mucous membranes are moist.      Pharynx: Oropharynx is clear.   Eyes:      Extraocular Movements: Extraocular movements intact.      Conjunctiva/sclera: Conjunctivae normal.      Pupils: Pupils are equal, round, and reactive to light.   Cardiovascular:      Rate and Rhythm: Normal rate and regular rhythm.      Pulses: Normal pulses.      Heart sounds: Normal heart sounds.   Pulmonary:      Effort: Pulmonary effort is normal.      Breath sounds: Rhonchi present.   Abdominal:      General: Abdomen is flat. Bowel sounds are normal.      Palpations: Abdomen is soft. "   Musculoskeletal:         General: Normal range of motion.      Cervical back: Normal range of motion and neck supple.   Skin:     General: Skin is warm and dry.   Neurological:      General: No focal deficit present.      Mental Status: She is alert and oriented to person, place, and time.   Psychiatric:         Mood and Affect: Mood normal.         Behavior: Behavior normal.

## 2025-02-11 ENCOUNTER — OFFICE VISIT (OUTPATIENT)
Dept: FAMILY MEDICINE CLINIC | Facility: CLINIC | Age: 55
End: 2025-02-11
Payer: COMMERCIAL

## 2025-02-11 VITALS
RESPIRATION RATE: 20 BRPM | DIASTOLIC BLOOD PRESSURE: 76 MMHG | WEIGHT: 134 LBS | OXYGEN SATURATION: 93 % | BODY MASS INDEX: 24.66 KG/M2 | HEART RATE: 94 BPM | TEMPERATURE: 97.7 F | HEIGHT: 62 IN | SYSTOLIC BLOOD PRESSURE: 134 MMHG

## 2025-02-11 DIAGNOSIS — M54.9 CHRONIC NECK AND BACK PAIN: Primary | ICD-10-CM

## 2025-02-11 DIAGNOSIS — J45.40 MODERATE PERSISTENT ASTHMA, UNSPECIFIED WHETHER COMPLICATED: ICD-10-CM

## 2025-02-11 DIAGNOSIS — F17.200 TOBACCO USE DISORDER: ICD-10-CM

## 2025-02-11 DIAGNOSIS — E55.9 VITAMIN D DEFICIENCY: ICD-10-CM

## 2025-02-11 DIAGNOSIS — M54.6 CHRONIC BILATERAL THORACIC BACK PAIN: ICD-10-CM

## 2025-02-11 DIAGNOSIS — G89.29 CHRONIC BILATERAL THORACIC BACK PAIN: ICD-10-CM

## 2025-02-11 DIAGNOSIS — M54.2 CHRONIC NECK AND BACK PAIN: Primary | ICD-10-CM

## 2025-02-11 DIAGNOSIS — G89.29 CHRONIC NECK AND BACK PAIN: Primary | ICD-10-CM

## 2025-02-11 DIAGNOSIS — J44.9 CHRONIC OBSTRUCTIVE PULMONARY DISEASE, UNSPECIFIED COPD TYPE (HCC): ICD-10-CM

## 2025-02-11 DIAGNOSIS — E78.2 MIXED HYPERLIPIDEMIA: ICD-10-CM

## 2025-02-11 DIAGNOSIS — J40 BRONCHITIS: ICD-10-CM

## 2025-02-11 PROCEDURE — 99214 OFFICE O/P EST MOD 30 MIN: CPT | Performed by: FAMILY MEDICINE

## 2025-02-11 RX ORDER — DEXTROMETHORPHAN HYDROBROMIDE AND PROMETHAZINE HYDROCHLORIDE 15; 6.25 MG/5ML; MG/5ML
5 SYRUP ORAL 4 TIMES DAILY PRN
Qty: 180 ML | Refills: 0 | Status: SHIPPED | OUTPATIENT
Start: 2025-02-11

## 2025-02-11 RX ORDER — DOXYCYCLINE HYCLATE 100 MG
100 TABLET ORAL 2 TIMES DAILY
Qty: 28 TABLET | Refills: 0 | Status: SHIPPED | OUTPATIENT
Start: 2025-02-11 | End: 2025-02-25

## 2025-02-11 RX ORDER — METHYLPREDNISOLONE 4 MG/1
TABLET ORAL
Qty: 21 EACH | Refills: 0 | Status: SHIPPED | OUTPATIENT
Start: 2025-02-11

## 2025-02-11 RX ORDER — HYDROCODONE BITARTRATE AND ACETAMINOPHEN 7.5; 325 MG/1; MG/1
1 TABLET ORAL EVERY 6 HOURS PRN
Qty: 90 TABLET | Refills: 0 | Status: SHIPPED | OUTPATIENT
Start: 2025-02-11

## 2025-02-11 NOTE — ASSESSMENT & PLAN NOTE
The patient has been in nicotine replacement failure Chantix and Wellbutrin are contraindicated due to her medications for depression

## 2025-02-11 NOTE — PROGRESS NOTES
Name: Ana Hernandez      : 1970      MRN: [id number]  Encounter Provider: Boyd Barron DO  Encounter Date: 2025   Encounter department: [facility] PRIMARY CARE  :  Assessment & Plan  Chronic neck and back pain  Hydrocodone is effective therapy  Orders:  •  HYDROcodone-acetaminophen (NORCO) 7.5-325 mg per tablet; Take 1 tablet by mouth every 6 (six) hours as needed for pain Max Daily Amount: 3 tablets    Chronic bilateral thoracic back pain  We will obtain an x-ray of the thoracic spine  Orders:  •  XR spine thoracic 2 vw; Future    Bronchitis  We will provide a Medrol Dosepak doxycycline 100 mg twice daily and Promethazine DM.  The patient was reminded that she is due for the cancer screening CAT scan of the chest.  Did inform the patient that she is also due for laboratory has been placed.  Orders:  •  CBC and differential; Future  •  Mycoplasma Pneumoniae AB, IgG/IgM; Future  •  Comprehensive metabolic panel; Future  •  methylPREDNISolone 4 MG tablet therapy pack; Use as directed on package  •  doxycycline hyclate (VIBRA-TABS) 100 mg tablet; Take 1 tablet (100 mg total) by mouth 2 (two) times a day for 14 days  •  promethazine-dextromethorphan (PHENERGAN-DM) 6.25-15 mg/5 mL oral syrup; Take 5 mL by mouth 4 (four) times a day as needed for cough    Chronic obstructive pulmonary disease, unspecified COPD type (HCC)  Patient uses Trelegy as a maintenance inhaler and albuterol as a rescue inhaler       Moderate persistent asthma, unspecified whether complicated  As above       Tobacco use disorder  The patient has been in nicotine replacement failure Chantix and Wellbutrin are contraindicated due to her medications for depression       Mixed hyperlipidemia    Orders:  •  Lipid Panel with Direct LDL reflex; Future    Vitamin D deficiency    Orders:  •  Vitamin D 25 hydroxy; Future           History of Present Illness   Presents for 4-week checkup on chronic neck and back pain with a  "complaint of thoracic back pain she relates to shoveling snow.  She states that she is getting a cracking sound in the mid back when she turns to the side.  Patient also complains of cough productive of yellowish phlegm and shortness of breath    Back Pain  Pertinent negatives include no abdominal pain, chest pain, dysuria or fever.     Review of Systems   Constitutional:  Negative for chills and fever.   HENT:  Negative for ear pain and sore throat.    Eyes:  Negative for pain and visual disturbance.   Respiratory:  Positive for cough, shortness of breath and wheezing.    Cardiovascular:  Negative for chest pain and palpitations.   Gastrointestinal:  Negative for abdominal pain and vomiting.   Genitourinary:  Negative for dysuria and hematuria.   Musculoskeletal:  Positive for back pain and neck pain. Negative for arthralgias.   Skin:  Negative for color change and rash.   Neurological:  Negative for seizures and syncope.   All other systems reviewed and are negative.      Objective   /76   Pulse 94   Temp 97.7 °F (36.5 °C)   Resp 20   Ht 5' 2\" (1.575 m)   Wt 60.8 kg (134 lb)   SpO2 93%   BMI 24.51 kg/m²      Physical Exam  Constitutional:       Appearance: Normal appearance.   HENT:      Head: Normocephalic and atraumatic.      Right Ear: Tympanic membrane, ear canal and external ear normal.      Left Ear: Tympanic membrane, ear canal and external ear normal.      Nose: Nose normal.      Mouth/Throat:      Mouth: Mucous membranes are moist.      Pharynx: Oropharynx is clear.   Eyes:      Extraocular Movements: Extraocular movements intact.      Conjunctiva/sclera: Conjunctivae normal.      Pupils: Pupils are equal, round, and reactive to light.   Cardiovascular:      Rate and Rhythm: Normal rate and regular rhythm.      Pulses: Normal pulses.      Heart sounds: Normal heart sounds.   Pulmonary:      Effort: Pulmonary effort is normal.      Breath sounds: Rhonchi present.   Abdominal:      General: " Abdomen is flat. Bowel sounds are normal.      Palpations: Abdomen is soft.   Musculoskeletal:         General: Normal range of motion.      Cervical back: Normal range of motion and neck supple. No tenderness.      Comments: Decreased range of motion of the cervical spine with multiple trigger points trigger points down the thoracic and lumbar spine with bilateral SI tenderness   Skin:     General: Skin is warm and dry.      Capillary Refill: Capillary refill takes less than 2 seconds.   Neurological:      General: No focal deficit present.      Mental Status: She is alert and oriented to person, place, and time.   Psychiatric:         Mood and Affect: Mood normal.         Behavior: Behavior normal.

## 2025-03-09 DIAGNOSIS — G62.9 NEUROPATHY: ICD-10-CM

## 2025-03-10 ENCOUNTER — OFFICE VISIT (OUTPATIENT)
Dept: FAMILY MEDICINE CLINIC | Facility: CLINIC | Age: 55
End: 2025-03-10
Payer: COMMERCIAL

## 2025-03-10 VITALS
DIASTOLIC BLOOD PRESSURE: 68 MMHG | HEART RATE: 96 BPM | BODY MASS INDEX: 24.48 KG/M2 | RESPIRATION RATE: 20 BRPM | SYSTOLIC BLOOD PRESSURE: 130 MMHG | HEIGHT: 62 IN | WEIGHT: 133 LBS | TEMPERATURE: 97.7 F | OXYGEN SATURATION: 94 %

## 2025-03-10 DIAGNOSIS — F17.200 TOBACCO USE DISORDER: ICD-10-CM

## 2025-03-10 DIAGNOSIS — J40 BRONCHITIS: ICD-10-CM

## 2025-03-10 DIAGNOSIS — E78.2 MIXED HYPERLIPIDEMIA: ICD-10-CM

## 2025-03-10 DIAGNOSIS — G62.9 NEUROPATHY: ICD-10-CM

## 2025-03-10 DIAGNOSIS — M54.9 CHRONIC NECK AND BACK PAIN: ICD-10-CM

## 2025-03-10 DIAGNOSIS — M54.2 CHRONIC NECK AND BACK PAIN: ICD-10-CM

## 2025-03-10 DIAGNOSIS — Z88.9 HISTORY OF SEASONAL ALLERGIES: ICD-10-CM

## 2025-03-10 DIAGNOSIS — G89.29 CHRONIC NECK AND BACK PAIN: ICD-10-CM

## 2025-03-10 DIAGNOSIS — G89.29 CHRONIC BILATERAL THORACIC BACK PAIN: Primary | ICD-10-CM

## 2025-03-10 DIAGNOSIS — M54.6 CHRONIC BILATERAL THORACIC BACK PAIN: Primary | ICD-10-CM

## 2025-03-10 PROCEDURE — 99214 OFFICE O/P EST MOD 30 MIN: CPT | Performed by: FAMILY MEDICINE

## 2025-03-10 RX ORDER — LORATADINE 10 MG/1
10 TABLET ORAL DAILY
Qty: 100 TABLET | Refills: 1 | Status: SHIPPED | OUTPATIENT
Start: 2025-03-10

## 2025-03-10 RX ORDER — ATORVASTATIN CALCIUM 10 MG/1
10 TABLET, FILM COATED ORAL DAILY
Qty: 90 TABLET | Refills: 1 | Status: SHIPPED | OUTPATIENT
Start: 2025-03-10

## 2025-03-10 RX ORDER — HYDROCODONE BITARTRATE AND ACETAMINOPHEN 7.5; 325 MG/1; MG/1
1 TABLET ORAL EVERY 6 HOURS PRN
Qty: 90 TABLET | Refills: 0 | Status: SHIPPED | OUTPATIENT
Start: 2025-03-10

## 2025-03-10 RX ORDER — BENZONATATE 200 MG/1
200 CAPSULE ORAL 3 TIMES DAILY PRN
Qty: 20 CAPSULE | Refills: 2 | Status: SHIPPED | OUTPATIENT
Start: 2025-03-10

## 2025-03-10 RX ORDER — LANOLIN ALCOHOL/MO/W.PET/CERES
50 CREAM (GRAM) TOPICAL DAILY
Qty: 90 TABLET | Refills: 1 | Status: SHIPPED | OUTPATIENT
Start: 2025-03-10

## 2025-03-10 RX ORDER — LANOLIN ALCOHOL/MO/W.PET/CERES
50 CREAM (GRAM) TOPICAL DAILY
Qty: 90 TABLET | Refills: 1 | OUTPATIENT
Start: 2025-03-10

## 2025-03-10 NOTE — ASSESSMENT & PLAN NOTE
Patient currently is taking Lipitor 10 mg laboratories pending  Orders:    atorvastatin (LIPITOR) 10 mg tablet; Take 1 tablet (10 mg total) by mouth daily

## 2025-03-10 NOTE — PROGRESS NOTES
Name: Ana Hernandez      : 1970      MRN: [id number]  Encounter Provider: Boyd Barron DO  Encounter Date: 3/10/2025   Encounter department: [facility] PRIMARY CARE  :  Assessment & Plan  Chronic bilateral thoracic back pain  The PDMP has been reviewed no issues found no evidence of divergence or abuse hydrocodone 7.5 every 6 hours as needed is effective therapy       Chronic neck and back pain  As above  Orders:    HYDROcodone-acetaminophen (NORCO) 7.5-325 mg per tablet; Take 1 tablet by mouth every 6 (six) hours as needed for pain Max Daily Amount: 3 tablets    Bronchitis  The patient uses Trelegy as a maintenance inhaler albuterol as a rescue inhaler and Tessalon Perles for cough suppression  Orders:    benzonatate (TESSALON) 200 MG capsule; Take 1 capsule (200 mg total) by mouth 3 (three) times a day as needed for cough    History of seasonal allergies  The patient uses Singulair and Claritin  Orders:    loratadine (CLARITIN) 10 mg tablet; Take 1 tablet (10 mg total) by mouth daily    Mixed hyperlipidemia  Patient currently is taking Lipitor 10 mg laboratories pending  Orders:    atorvastatin (LIPITOR) 10 mg tablet; Take 1 tablet (10 mg total) by mouth daily    Neuropathy  Patient takes vitamin B6 daily  Orders:    pyridoxine (VITAMIN B6) 50 mg tablet; Take 1 tablet (50 mg total) by mouth daily    Tobacco use disorder  The patient is attempting a nicotine patch that she has had at home              History of Present Illness   Patient presents for 1 month checkup on chronic neck and back pain, COPD mixed type, chronic cough and allergies      Review of Systems   Constitutional:  Negative for chills and fever.   HENT:  Negative for ear pain and sore throat.    Eyes:  Negative for pain and visual disturbance.   Respiratory:  Positive for cough, shortness of breath and wheezing.    Cardiovascular:  Negative for chest pain and palpitations.   Gastrointestinal:  Negative for abdominal  "pain and vomiting.   Genitourinary:  Negative for dysuria and hematuria.   Musculoskeletal:  Positive for back pain and neck pain. Negative for arthralgias.   Skin:  Negative for color change and rash.   Neurological:  Negative for seizures and syncope.   All other systems reviewed and are negative.      Objective   /68   Pulse 96   Temp 97.7 °F (36.5 °C)   Resp 20   Ht 5' 2\" (1.575 m)   Wt 60.3 kg (133 lb)   SpO2 94%   BMI 24.33 kg/m²      Physical Exam  Constitutional:       Appearance: Normal appearance.   HENT:      Head: Normocephalic and atraumatic.      Right Ear: Tympanic membrane, ear canal and external ear normal.      Left Ear: Tympanic membrane, ear canal and external ear normal.      Nose: Nose normal.      Mouth/Throat:      Mouth: Mucous membranes are moist.      Pharynx: Oropharynx is clear.   Eyes:      Extraocular Movements: Extraocular movements intact.      Conjunctiva/sclera: Conjunctivae normal.      Pupils: Pupils are equal, round, and reactive to light.   Cardiovascular:      Rate and Rhythm: Normal rate and regular rhythm.      Pulses: Normal pulses.      Heart sounds: Normal heart sounds.   Pulmonary:      Effort: Pulmonary effort is normal.      Breath sounds: Normal breath sounds.   Abdominal:      General: Abdomen is flat. Bowel sounds are normal.      Palpations: Abdomen is soft.   Musculoskeletal:         General: Normal range of motion.      Cervical back: Normal range of motion and neck supple.      Comments: Decreased range of motion of the cervical spine with multiple trigger points lumbar paraspinal spasm with bilateral SI tenderness   Skin:     General: Skin is warm and dry.   Neurological:      General: No focal deficit present.      Mental Status: She is alert and oriented to person, place, and time.   Psychiatric:         Mood and Affect: Mood normal.         Behavior: Behavior normal.         "

## 2025-04-07 ENCOUNTER — OFFICE VISIT (OUTPATIENT)
Dept: FAMILY MEDICINE CLINIC | Facility: CLINIC | Age: 55
End: 2025-04-07
Payer: COMMERCIAL

## 2025-04-07 VITALS
HEART RATE: 90 BPM | TEMPERATURE: 97.7 F | OXYGEN SATURATION: 96 % | HEIGHT: 62 IN | DIASTOLIC BLOOD PRESSURE: 64 MMHG | BODY MASS INDEX: 24.73 KG/M2 | WEIGHT: 134.4 LBS | SYSTOLIC BLOOD PRESSURE: 108 MMHG

## 2025-04-07 DIAGNOSIS — J20.9 ACUTE BRONCHITIS, UNSPECIFIED ORGANISM: Primary | ICD-10-CM

## 2025-04-07 DIAGNOSIS — G89.29 CHRONIC NECK AND BACK PAIN: ICD-10-CM

## 2025-04-07 DIAGNOSIS — F17.200 TOBACCO USE DISORDER: ICD-10-CM

## 2025-04-07 DIAGNOSIS — F17.210 SMOKING GREATER THAN 20 PACK YEARS: ICD-10-CM

## 2025-04-07 DIAGNOSIS — J44.9 CHRONIC OBSTRUCTIVE PULMONARY DISEASE, UNSPECIFIED COPD TYPE (HCC): ICD-10-CM

## 2025-04-07 DIAGNOSIS — M54.2 CHRONIC NECK AND BACK PAIN: ICD-10-CM

## 2025-04-07 DIAGNOSIS — M54.9 CHRONIC NECK AND BACK PAIN: ICD-10-CM

## 2025-04-07 PROCEDURE — 99214 OFFICE O/P EST MOD 30 MIN: CPT | Performed by: FAMILY MEDICINE

## 2025-04-07 RX ORDER — HYDROCODONE BITARTRATE AND ACETAMINOPHEN 7.5; 325 MG/1; MG/1
1 TABLET ORAL EVERY 6 HOURS PRN
Qty: 90 TABLET | Refills: 0 | Status: SHIPPED | OUTPATIENT
Start: 2025-04-07

## 2025-04-07 RX ORDER — METHYLPREDNISOLONE 4 MG/1
TABLET ORAL
Qty: 21 EACH | Refills: 0 | Status: CANCELLED | OUTPATIENT
Start: 2025-04-07

## 2025-04-07 RX ORDER — DOXYCYCLINE HYCLATE 100 MG
100 TABLET ORAL 2 TIMES DAILY
Qty: 28 TABLET | Refills: 0 | Status: SHIPPED | OUTPATIENT
Start: 2025-04-07 | End: 2025-04-21

## 2025-04-07 RX ORDER — PREDNISONE 10 MG/1
10 TABLET ORAL 2 TIMES DAILY WITH MEALS
Qty: 30 TABLET | Refills: 1 | Status: SHIPPED | OUTPATIENT
Start: 2025-04-07

## 2025-04-07 NOTE — PROGRESS NOTES
Name: Ana Hernandez      : 1970      MRN: [id number]  Encounter Provider: Boyd Barron DO  Encounter Date: 2025   Encounter department: [facility] PRIMARY CARE  :  Assessment & Plan  Acute bronchitis, unspecified organism    Orders:  •  predniSONE 10 mg tablet; Take 1 tablet (10 mg total) by mouth 2 (two) times a day with meals  •  doxycycline hyclate (VIBRA-TABS) 100 mg tablet; Take 1 tablet (100 mg total) by mouth 2 (two) times a day for 14 days    Chronic obstructive pulmonary disease, unspecified COPD type (HCC)         Chronic neck and back pain  Hydrocodone is effective therapy the PDMP is reviewed no issues found no evidence of divergence or abuse  Orders:  •  HYDROcodone-acetaminophen (NORCO) 7.5-325 mg per tablet; Take 1 tablet by mouth every 6 (six) hours as needed for pain Max Daily Amount: 3 tablets    Smoking greater than 20 pack years  The patient is agreeable to a low-dose CAT scan of the chest and a lung cancer screening program  Orders:  •  CT lung screening program; Future    Tobacco use disorder  Patient so far has been unable to quit Chantix and Wellbutrin are contraindicated due to the current medication list.  She has cut down on nicotine replacement therapy but has not quit              History of Present Illness   Patient presents with a chief complaint of shortness of breath coughing up yellowish phlegm of 2 weeks duration      Review of Systems   Constitutional:  Negative for chills and fever.   HENT:  Negative for ear pain and sore throat.    Eyes:  Negative for pain and visual disturbance.   Respiratory:  Positive for cough and shortness of breath.    Cardiovascular:  Negative for chest pain and palpitations.   Gastrointestinal:  Negative for abdominal pain and vomiting.   Genitourinary:  Negative for dysuria and hematuria.   Musculoskeletal:  Negative for arthralgias and back pain.   Skin:  Negative for color change and rash.   Neurological:  Negative  "for seizures and syncope.   All other systems reviewed and are negative.      Objective   /64 (BP Location: Left arm, Patient Position: Sitting)   Pulse 90   Temp 97.7 °F (36.5 °C) (Tympanic)   Ht 5' 2\" (1.575 m)   Wt 61 kg (134 lb 6.4 oz)   SpO2 96%   BMI 24.58 kg/m²      Physical Exam  Constitutional:       Appearance: Normal appearance.   HENT:      Head: Normocephalic and atraumatic.      Right Ear: Tympanic membrane, ear canal and external ear normal.      Left Ear: Tympanic membrane, ear canal and external ear normal.      Nose: Nose normal.      Mouth/Throat:      Mouth: Mucous membranes are moist.      Pharynx: Oropharynx is clear.   Eyes:      Extraocular Movements: Extraocular movements intact.      Conjunctiva/sclera: Conjunctivae normal.      Pupils: Pupils are equal, round, and reactive to light.   Cardiovascular:      Rate and Rhythm: Normal rate and regular rhythm.      Pulses: Normal pulses.      Heart sounds: Normal heart sounds.   Pulmonary:      Effort: Pulmonary effort is normal.      Breath sounds: Normal breath sounds.   Abdominal:      General: Abdomen is flat. Bowel sounds are normal.      Palpations: Abdomen is soft.   Musculoskeletal:         General: Normal range of motion.      Cervical back: Normal range of motion and neck supple.   Skin:     General: Skin is warm and dry.   Neurological:      General: No focal deficit present.      Mental Status: She is alert and oriented to person, place, and time.   Psychiatric:         Mood and Affect: Mood normal.         Behavior: Behavior normal.         "

## 2025-04-07 NOTE — ASSESSMENT & PLAN NOTE
Patient so far has been unable to quit Chantix and Wellbutrin are contraindicated due to the current medication list.  She has cut down on nicotine replacement therapy but has not quit

## 2025-05-01 ENCOUNTER — TELEPHONE (OUTPATIENT)
Dept: FAMILY MEDICINE CLINIC | Facility: CLINIC | Age: 55
End: 2025-05-01

## 2025-05-05 ENCOUNTER — OFFICE VISIT (OUTPATIENT)
Dept: FAMILY MEDICINE CLINIC | Facility: CLINIC | Age: 55
End: 2025-05-05
Payer: COMMERCIAL

## 2025-05-05 VITALS
RESPIRATION RATE: 16 BRPM | WEIGHT: 134 LBS | DIASTOLIC BLOOD PRESSURE: 76 MMHG | HEART RATE: 76 BPM | BODY MASS INDEX: 24.66 KG/M2 | SYSTOLIC BLOOD PRESSURE: 130 MMHG | HEIGHT: 62 IN | TEMPERATURE: 97.9 F

## 2025-05-05 DIAGNOSIS — F17.200 TOBACCO USE DISORDER: ICD-10-CM

## 2025-05-05 DIAGNOSIS — J44.9 CHRONIC OBSTRUCTIVE PULMONARY DISEASE, UNSPECIFIED COPD TYPE (HCC): Primary | ICD-10-CM

## 2025-05-05 DIAGNOSIS — G89.29 CHRONIC NECK AND BACK PAIN: ICD-10-CM

## 2025-05-05 DIAGNOSIS — M54.9 CHRONIC NECK AND BACK PAIN: ICD-10-CM

## 2025-05-05 DIAGNOSIS — F41.9 ANXIETY: ICD-10-CM

## 2025-05-05 DIAGNOSIS — J40 BRONCHITIS: ICD-10-CM

## 2025-05-05 DIAGNOSIS — M54.2 CHRONIC NECK AND BACK PAIN: ICD-10-CM

## 2025-05-05 PROCEDURE — 99213 OFFICE O/P EST LOW 20 MIN: CPT | Performed by: FAMILY MEDICINE

## 2025-05-05 RX ORDER — HYDROXYZINE PAMOATE 50 MG/1
50 CAPSULE ORAL 3 TIMES DAILY PRN
Qty: 30 CAPSULE | Refills: 5 | Status: SHIPPED | OUTPATIENT
Start: 2025-05-05

## 2025-05-05 RX ORDER — HYDROCODONE BITARTRATE AND ACETAMINOPHEN 7.5; 325 MG/1; MG/1
1 TABLET ORAL EVERY 6 HOURS PRN
Qty: 90 TABLET | Refills: 0 | Status: SHIPPED | OUTPATIENT
Start: 2025-05-05

## 2025-05-05 RX ORDER — BENZONATATE 200 MG/1
200 CAPSULE ORAL 3 TIMES DAILY PRN
Qty: 20 CAPSULE | Refills: 2 | Status: SHIPPED | OUTPATIENT
Start: 2025-05-05

## 2025-05-05 NOTE — PROGRESS NOTES
Name: Ana Hernandez      : 1970      MRN: [id number]  Encounter Provider: Boyd Barron DO  Encounter Date: 2025   Encounter department: [facility] PRIMARY CARE  :  Assessment & Plan  Chronic obstructive pulmonary disease, unspecified COPD type (HCC)  Patient uses Trelegy as a maintenance inhaler and albuterol as a rescue inhaler       Bronchitis  Patient uses Tessalon Perles for the chronic cough  Orders:  •  benzonatate (TESSALON) 200 MG capsule; Take 1 capsule (200 mg total) by mouth 3 (three) times a day as needed for cough    Anxiety  Patient takes Vistaril 50 mg 3 times daily as needed for anxiety  Orders:  •  hydrOXYzine pamoate (VISTARIL) 50 mg capsule; Take 1 capsule (50 mg total) by mouth 3 (three) times a day as needed for itching    Chronic neck and back pain  Hydrocodone is effective therapy the PDMP reviewed no issues found no evidence of divergence or abuse.  The patient also takes Voltaren 75 mg up to 2 times daily for the back and neck pain  Orders:  •  HYDROcodone-acetaminophen (NORCO) 7.5-325 mg per tablet; Take 1 tablet by mouth every 6 (six) hours as needed for pain Max Daily Amount: 3 tablets    Tobacco use disorder  The patient has been in nicotine replacement failure he is not a candidate for Wellbutrin or Chantix due to her current medical regimen.              History of Present Illness   Patient presents for checkup on chronic neck and back pain as well as anxiety depression and COPD      Review of Systems   Constitutional:  Negative for chills and fever.   HENT:  Negative for ear pain and sore throat.    Eyes:  Negative for pain and visual disturbance.   Respiratory:  Positive for cough, shortness of breath and wheezing.    Cardiovascular:  Negative for chest pain and palpitations.   Gastrointestinal:  Negative for abdominal pain and vomiting.   Genitourinary:  Negative for dysuria and hematuria.   Musculoskeletal:  Positive for back pain and neck pain.  "Negative for arthralgias.   Skin:  Negative for color change and rash.   Neurological:  Negative for seizures and syncope.   All other systems reviewed and are negative.      Objective   /76   Pulse 76   Temp 97.9 °F (36.6 °C)   Resp 16   Ht 5' 2\" (1.575 m)   Wt 60.8 kg (134 lb)   BMI 24.51 kg/m²      Physical Exam  Constitutional:       Appearance: Normal appearance.   HENT:      Head: Normocephalic and atraumatic.      Right Ear: Tympanic membrane, ear canal and external ear normal.      Left Ear: Tympanic membrane, ear canal and external ear normal.      Nose: Nose normal.      Mouth/Throat:      Mouth: Mucous membranes are moist.      Pharynx: Oropharynx is clear.   Eyes:      Extraocular Movements: Extraocular movements intact.      Conjunctiva/sclera: Conjunctivae normal.      Pupils: Pupils are equal, round, and reactive to light.   Cardiovascular:      Rate and Rhythm: Normal rate and regular rhythm.      Pulses: Normal pulses.      Heart sounds: Normal heart sounds.   Pulmonary:      Effort: Pulmonary effort is normal.      Breath sounds: Normal breath sounds.   Abdominal:      General: Abdomen is flat. Bowel sounds are normal.      Palpations: Abdomen is soft.   Musculoskeletal:         General: Normal range of motion.      Cervical back: Normal range of motion and neck supple.      Comments: Decreased range of motion of the cervical spine with multiple trigger points lumbar spine paraspinal spasm bilateral SI tenderness   Skin:     General: Skin is warm and dry.   Neurological:      General: No focal deficit present.      Mental Status: She is alert and oriented to person, place, and time.   Psychiatric:         Mood and Affect: Mood normal.         Behavior: Behavior normal.         "

## 2025-05-05 NOTE — ASSESSMENT & PLAN NOTE
The patient has been in nicotine replacement failure he is not a candidate for Wellbutrin or Chantix due to her current medical regimen.

## 2025-05-29 ENCOUNTER — VBI (OUTPATIENT)
Dept: ADMINISTRATIVE | Facility: OTHER | Age: 55
End: 2025-05-29

## 2025-05-29 NOTE — TELEPHONE ENCOUNTER
05/29/25 12:53 PM     Chart reviewed for CRC: Colonoscopy ; nothing is submitted to the patient's insurance at this time.     Shanae Duenas   [facility]

## 2025-06-01 DIAGNOSIS — F33.41 RECURRENT MAJOR DEPRESSIVE DISORDER, IN PARTIAL REMISSION (HCC): ICD-10-CM

## 2025-06-02 RX ORDER — QUETIAPINE FUMARATE 100 MG/1
100 TABLET, FILM COATED ORAL
Qty: 90 TABLET | Refills: 1 | Status: SHIPPED | OUTPATIENT
Start: 2025-06-02

## 2025-06-03 DIAGNOSIS — G89.29 CHRONIC BILATERAL LOW BACK PAIN WITHOUT SCIATICA: ICD-10-CM

## 2025-06-03 DIAGNOSIS — M54.50 CHRONIC BILATERAL LOW BACK PAIN WITHOUT SCIATICA: ICD-10-CM

## 2025-06-09 ENCOUNTER — OFFICE VISIT (OUTPATIENT)
Dept: FAMILY MEDICINE CLINIC | Facility: CLINIC | Age: 55
End: 2025-06-09
Payer: COMMERCIAL

## 2025-06-09 VITALS
WEIGHT: 136 LBS | SYSTOLIC BLOOD PRESSURE: 126 MMHG | BODY MASS INDEX: 25.03 KG/M2 | HEIGHT: 62 IN | HEART RATE: 84 BPM | DIASTOLIC BLOOD PRESSURE: 84 MMHG | RESPIRATION RATE: 20 BRPM | TEMPERATURE: 97.3 F

## 2025-06-09 DIAGNOSIS — M54.9 CHRONIC NECK AND BACK PAIN: ICD-10-CM

## 2025-06-09 DIAGNOSIS — J20.9 ACUTE BRONCHITIS, UNSPECIFIED ORGANISM: Primary | ICD-10-CM

## 2025-06-09 DIAGNOSIS — M54.50 CHRONIC BILATERAL LOW BACK PAIN WITHOUT SCIATICA: ICD-10-CM

## 2025-06-09 DIAGNOSIS — G89.29 CHRONIC NECK AND BACK PAIN: ICD-10-CM

## 2025-06-09 DIAGNOSIS — G89.29 CHRONIC BILATERAL LOW BACK PAIN WITHOUT SCIATICA: ICD-10-CM

## 2025-06-09 DIAGNOSIS — Z79.891 OPIOID USE AGREEMENT EXISTS: ICD-10-CM

## 2025-06-09 DIAGNOSIS — F17.200 TOBACCO USE DISORDER: ICD-10-CM

## 2025-06-09 DIAGNOSIS — F11.20 OPIOID TYPE DEPENDENCE, CONTINUOUS (HCC): ICD-10-CM

## 2025-06-09 DIAGNOSIS — M54.2 CHRONIC NECK AND BACK PAIN: ICD-10-CM

## 2025-06-09 DIAGNOSIS — J44.9 CHRONIC OBSTRUCTIVE PULMONARY DISEASE, UNSPECIFIED COPD TYPE (HCC): ICD-10-CM

## 2025-06-09 PROCEDURE — 99214 OFFICE O/P EST MOD 30 MIN: CPT | Performed by: FAMILY MEDICINE

## 2025-06-09 RX ORDER — PREDNISONE 10 MG/1
10 TABLET ORAL 2 TIMES DAILY WITH MEALS
Qty: 30 TABLET | Refills: 1 | Status: SHIPPED | OUTPATIENT
Start: 2025-06-09

## 2025-06-09 RX ORDER — HYDROCODONE BITARTRATE AND ACETAMINOPHEN 7.5; 325 MG/1; MG/1
1 TABLET ORAL EVERY 6 HOURS PRN
Qty: 90 TABLET | Refills: 0 | Status: SHIPPED | OUTPATIENT
Start: 2025-06-09

## 2025-06-09 RX ORDER — DOXYCYCLINE HYCLATE 100 MG
100 TABLET ORAL 2 TIMES DAILY
Qty: 28 TABLET | Refills: 0 | Status: SHIPPED | OUTPATIENT
Start: 2025-06-09 | End: 2025-06-23

## 2025-06-09 RX ORDER — BENZONATATE 200 MG/1
200 CAPSULE ORAL 3 TIMES DAILY PRN
Qty: 20 CAPSULE | Refills: 2 | Status: SHIPPED | OUTPATIENT
Start: 2025-06-09

## 2025-06-09 NOTE — PROGRESS NOTES
Name: Ana Hernandez      : 1970      MRN: [id number]  Encounter Provider: Boyd Barron DO  Encounter Date: 2025   Encounter department: [facility]  :  Assessment & Plan  Acute bronchitis, unspecified organism  We will provide prednisone 10 mg twice daily, doxycycline 100 mg twice daily for 2 weeks.  And Tessalon Perles 203 times daily  Orders:  •  benzonatate (TESSALON) 200 MG capsule; Take 1 capsule (200 mg total) by mouth 3 (three) times a day as needed for cough  •  predniSONE 10 mg tablet; Take 1 tablet (10 mg total) by mouth 2 (two) times a day with meals  •  doxycycline hyclate (VIBRA-TABS) 100 mg tablet; Take 1 tablet (100 mg total) by mouth 2 (two) times a day for 14 days    Chronic obstructive pulmonary disease, unspecified COPD type (HCC)  Patient uses Trelegy as a maintenance inhaler and albuterol as a rescue inhaler       Tobacco use disorder  The patient is down to 1 pack/day has not began the nicotine patch as of yet       Chronic neck and back pain  Hydrocodone is effective therapy the PDMP has been reviewed no issues found no evidence of diversion's or abuse  Orders:  •  HYDROcodone-acetaminophen (NORCO) 7.5-325 mg per tablet; Take 1 tablet by mouth every 6 (six) hours as needed for pain Max Daily Amount: 3 tablets    Chronic bilateral low back pain without sciatica  As above       Opioid type dependence, continuous (HCC)    Orders:  •  Hydrocodone and Metabolites, Urine; Future  •  Drug Screen Routine w /Conf and Adulteration, urine.; Future    Opioid use agreement exists    Orders:  •  Hydrocodone and Metabolites, Urine; Future  •  Drug Screen Routine w /Conf and Adulteration, urine.; Future           History of Present Illness   Patient presents with a chief complaint of increasing seasonal allergy symptoms and cough productive of yellowish-green phlegm      Review of Systems   Constitutional:  Negative for chills and fever.   HENT:  Negative for ear  "pain and sore throat.    Eyes:  Positive for discharge and itching. Negative for pain and visual disturbance.   Respiratory:  Positive for cough, shortness of breath and wheezing.    Cardiovascular:  Negative for chest pain and palpitations.   Gastrointestinal:  Negative for abdominal pain and vomiting.   Genitourinary:  Negative for dysuria and hematuria.   Musculoskeletal:  Negative for arthralgias and back pain.   Skin:  Negative for color change and rash.   Neurological:  Negative for seizures and syncope.   All other systems reviewed and are negative.      Objective   /84   Pulse 84   Temp (!) 97.3 °F (36.3 °C)   Resp 20   Ht 5' 2\" (1.575 m)   Wt 61.7 kg (136 lb)   BMI 24.87 kg/m²      Physical Exam  Constitutional:       Appearance: Normal appearance.   HENT:      Head: Normocephalic and atraumatic.      Right Ear: Tympanic membrane, ear canal and external ear normal.      Left Ear: Tympanic membrane, ear canal and external ear normal.      Nose: Nose normal.      Mouth/Throat:      Mouth: Mucous membranes are moist.      Pharynx: Oropharynx is clear.     Eyes:      Extraocular Movements: Extraocular movements intact.      Conjunctiva/sclera: Conjunctivae normal.      Pupils: Pupils are equal, round, and reactive to light.       Cardiovascular:      Rate and Rhythm: Normal rate and regular rhythm.      Pulses: Normal pulses.      Heart sounds: Normal heart sounds.   Pulmonary:      Effort: Pulmonary effort is normal.      Breath sounds: Normal breath sounds.   Abdominal:      General: Abdomen is flat. Bowel sounds are normal.      Palpations: Abdomen is soft.     Musculoskeletal:         General: Normal range of motion.      Cervical back: Normal range of motion and neck supple.      Comments: Decreased range of motion of the cervical spine with multiple trigger points lumbar paraspinal spasm with bilateral SI tenderness     Skin:     General: Skin is warm and dry.     Neurological:      General: " No focal deficit present.      Mental Status: She is alert and oriented to person, place, and time.     Psychiatric:         Mood and Affect: Mood normal.         Behavior: Behavior normal.

## 2025-06-09 NOTE — ASSESSMENT & PLAN NOTE
Orders:  •  Hydrocodone and Metabolites, Urine; Future  •  Drug Screen Routine w /Conf and Adulteration, urine.; Future

## 2025-07-08 ENCOUNTER — APPOINTMENT (OUTPATIENT)
Dept: LAB | Facility: HOSPITAL | Age: 55
End: 2025-07-08
Payer: COMMERCIAL

## 2025-07-08 DIAGNOSIS — Z79.891 OPIOID USE AGREEMENT EXISTS: ICD-10-CM

## 2025-07-08 DIAGNOSIS — F11.20 OPIOID TYPE DEPENDENCE, CONTINUOUS (HCC): ICD-10-CM

## 2025-07-08 PROCEDURE — 80361 OPIATES 1 OR MORE: CPT

## 2025-07-08 PROCEDURE — 80355 GABAPENTIN NON-BLOOD: CPT

## 2025-07-08 PROCEDURE — 80321 ALCOHOLS BIOMARKERS 1OR 2: CPT

## 2025-07-08 PROCEDURE — 80366 DRUG SCREENING PREGABALIN: CPT

## 2025-07-08 PROCEDURE — 80354 DRUG SCREENING FENTANYL: CPT

## 2025-07-08 PROCEDURE — 80307 DRUG TEST PRSMV CHEM ANLYZR: CPT

## 2025-07-08 PROCEDURE — 83992 ASSAY FOR PHENCYCLIDINE: CPT

## 2025-07-10 ENCOUNTER — OFFICE VISIT (OUTPATIENT)
Dept: FAMILY MEDICINE CLINIC | Facility: CLINIC | Age: 55
End: 2025-07-10
Payer: COMMERCIAL

## 2025-07-10 VITALS
HEART RATE: 84 BPM | RESPIRATION RATE: 20 BRPM | DIASTOLIC BLOOD PRESSURE: 68 MMHG | BODY MASS INDEX: 24.48 KG/M2 | TEMPERATURE: 97.9 F | WEIGHT: 133 LBS | SYSTOLIC BLOOD PRESSURE: 124 MMHG | HEIGHT: 62 IN

## 2025-07-10 DIAGNOSIS — F41.9 ANXIETY: ICD-10-CM

## 2025-07-10 DIAGNOSIS — R05.3 CHRONIC COUGH: ICD-10-CM

## 2025-07-10 DIAGNOSIS — M54.2 CHRONIC NECK AND BACK PAIN: ICD-10-CM

## 2025-07-10 DIAGNOSIS — G89.29 CHRONIC BILATERAL LOW BACK PAIN WITHOUT SCIATICA: Primary | ICD-10-CM

## 2025-07-10 DIAGNOSIS — M54.9 CHRONIC NECK AND BACK PAIN: ICD-10-CM

## 2025-07-10 DIAGNOSIS — F17.200 TOBACCO USE DISORDER: ICD-10-CM

## 2025-07-10 DIAGNOSIS — G89.29 CHRONIC NECK AND BACK PAIN: ICD-10-CM

## 2025-07-10 DIAGNOSIS — F33.41 RECURRENT MAJOR DEPRESSIVE DISORDER, IN PARTIAL REMISSION (HCC): ICD-10-CM

## 2025-07-10 DIAGNOSIS — M54.50 CHRONIC BILATERAL LOW BACK PAIN WITHOUT SCIATICA: Primary | ICD-10-CM

## 2025-07-10 PROCEDURE — 99213 OFFICE O/P EST LOW 20 MIN: CPT | Performed by: FAMILY MEDICINE

## 2025-07-10 RX ORDER — HYDROXYZINE PAMOATE 50 MG/1
50 CAPSULE ORAL 3 TIMES DAILY PRN
Qty: 30 CAPSULE | Refills: 5 | Status: SHIPPED | OUTPATIENT
Start: 2025-07-10

## 2025-07-10 RX ORDER — QUETIAPINE FUMARATE 100 MG/1
100 TABLET, FILM COATED ORAL
Qty: 90 TABLET | Refills: 1 | Status: SHIPPED | OUTPATIENT
Start: 2025-07-10

## 2025-07-10 RX ORDER — HYDROCODONE BITARTRATE AND ACETAMINOPHEN 7.5; 325 MG/1; MG/1
1 TABLET ORAL EVERY 6 HOURS PRN
Qty: 90 TABLET | Refills: 0 | Status: SHIPPED | OUTPATIENT
Start: 2025-07-10

## 2025-07-10 RX ORDER — BENZONATATE 200 MG/1
200 CAPSULE ORAL 3 TIMES DAILY PRN
Qty: 20 CAPSULE | Refills: 2 | Status: SHIPPED | OUTPATIENT
Start: 2025-07-10

## 2025-07-10 NOTE — ASSESSMENT & PLAN NOTE
Hydrocodone is effective therapy the PDMP has been reviewed no issues found no evidence of diversion's or abuse.  The medication used in conjunction with Voltaren 75 mg up to twice daily  Orders:  •  Calcium Carbonate 1500 (600 Ca) MG TABS; Take 1,500 mg by mouth in the morning and 1,500 mg before bedtime.

## 2025-07-10 NOTE — PROGRESS NOTES
Name: Ana Hernandez      : 1970      MRN: [id number]  Encounter Provider: Boyd Barron DO  Encounter Date: 7/10/2025   Encounter department: [facility]  :  Assessment & Plan  Chronic bilateral low back pain without sciatica  Hydrocodone is effective therapy the PDMP has been reviewed no issues found no evidence of diversion's or abuse.  The medication used in conjunction with Voltaren 75 mg up to twice daily  Orders:  •  Calcium Carbonate 1500 (600 Ca) MG TABS; Take 1,500 mg by mouth in the morning and 1,500 mg before bedtime.    Chronic neck and back pain  As above  Orders:  •  HYDROcodone-acetaminophen (NORCO) 7.5-325 mg per tablet; Take 1 tablet by mouth every 6 (six) hours as needed for pain Max Daily Amount: 3 tablets    Recurrent major depressive disorder, in partial remission (HCC)  Seroquel 100 mg at bedtime with Remeron 45 mg at bedtime is effective therapy    Orders:  •  QUEtiapine (SEROquel) 100 mg tablet; Take 1 tablet (100 mg total) by mouth daily at bedtime    Anxiety  50 mg up to 3 times daily as needed  Orders:  •  hydrOXYzine pamoate (VISTARIL) 50 mg capsule; Take 1 capsule (50 mg total) by mouth 3 (three) times a day as needed for itching    Chronic cough    Orders:  •  benzonatate (TESSALON) 200 MG capsule; Take 1 capsule (200 mg total) by mouth 3 (three) times a day as needed for cough    Tobacco use disorder                History of Present Illness   Patient presents for 1 month checkup on chronic low back pain and severe COPD      Review of Systems   Constitutional:  Negative for chills and fever.   HENT:  Negative for ear pain and sore throat.    Eyes:  Negative for pain and visual disturbance.   Respiratory:  Positive for cough, shortness of breath and wheezing.    Cardiovascular:  Negative for chest pain and palpitations.   Gastrointestinal:  Negative for abdominal pain and vomiting.   Genitourinary:  Negative for dysuria and hematuria.  "  Musculoskeletal:  Positive for back pain. Negative for arthralgias.   Skin:  Negative for color change and rash.   Neurological:  Negative for seizures and syncope.   Psychiatric/Behavioral:  Positive for dysphoric mood. The patient is nervous/anxious.    All other systems reviewed and are negative.      Objective   /68   Pulse 84   Temp 97.9 °F (36.6 °C)   Resp 20   Ht 5' 2\" (1.575 m)   Wt 60.3 kg (133 lb)   BMI 24.33 kg/m²      Physical Exam  Constitutional:       Appearance: Normal appearance.   HENT:      Head: Normocephalic and atraumatic.      Right Ear: Tympanic membrane, ear canal and external ear normal.      Left Ear: Tympanic membrane, ear canal and external ear normal.      Nose: Nose normal.      Mouth/Throat:      Mouth: Mucous membranes are moist.      Pharynx: Oropharynx is clear.     Eyes:      Extraocular Movements: Extraocular movements intact.      Conjunctiva/sclera: Conjunctivae normal.      Pupils: Pupils are equal, round, and reactive to light.       Cardiovascular:      Rate and Rhythm: Normal rate and regular rhythm.      Pulses: Normal pulses.      Heart sounds: Normal heart sounds.   Pulmonary:      Effort: Pulmonary effort is normal.      Breath sounds: Normal breath sounds.   Abdominal:      General: Abdomen is flat. Bowel sounds are normal.      Palpations: Abdomen is soft.     Musculoskeletal:         General: Normal range of motion.      Cervical back: Normal range of motion and neck supple.      Comments: Lumbar paraspinal spasm bilateral SI tenderness     Skin:     General: Skin is warm and dry.     Neurological:      General: No focal deficit present.      Mental Status: She is alert and oriented to person, place, and time.     Psychiatric:         Mood and Affect: Mood normal.         Behavior: Behavior normal.         "

## 2025-07-12 LAB
HYDROCODONE UR QL: 5376 NG/ML
HYDROMORPHONE UR QL: 1477 NG/ML

## 2025-07-14 LAB
6-ACETYLMORPHINE IA: NEGATIVE NG/ML
ACCEPTABLE CREAT UR QL: 298 MG/DL
AMPHET UR QL SCN: NEGATIVE NG/ML
ANTICONVULSANTS: NEGATIVE
BARBITURATES UR QL SCN: NEGATIVE NG/ML
BENZODIAZ UR QL SCN: NEGATIVE NG/ML
BUPRENORPHINE UR QL CFM: NEGATIVE NG/ML
CANNABINOIDS UR QL SCN: NEGATIVE NG/ML
CARISOPRODOL UR QL: NEGATIVE NG/ML
COCAINE+BZE UR QL SCN: NEGATIVE NG/ML
CODEINE UR QL CFM: NOT DETECTED NG/MG CREAT
DHC UR CFM-MCNC: 194 NG/MG CREAT
ETHANOL BIOMARKERS: NEGATIVE
ETHYL GLUCURONIDE UR QL CFM: NOT DETECTED NG/MG CREAT
ETHYL GLUCURONIDE UR QL SCN: NORMAL NG/ML
ETHYL SULFATE UR QL CFM: NOT DETECTED NG/MG CREAT
FENTANYL & ANALOGUES: NEGATIVE
FENTANYL UR QL CFM: NOT DETECTED NG/MG CREAT
FENTANYL UR QL SCN: NORMAL NG/ML
GABAPENTIN SERPLBLD QL SCN: NORMAL UG/ML
GABAPENTIN UR QL CFM: NOT DETECTED
HYDROCODONE UR QL CFM: 1081 NG/MG CREAT
HYDROMORPHONE UR QL CFM: 179 NG/MG CREAT
Lab: NEGATIVE
METHADONE UR QL SCN: NEGATIVE NG/ML
MORPHINE UR QL CFM: NOT DETECTED NG/MG CREAT
NITRITE UR QL STRIP: NEGATIVE UG/ML
NORCODEINE/CREAT UR CFM: NOT DETECTED NG/MG CREAT
NORFENTANYL UR QL CFM: NOT DETECTED NG/MG CREAT
NORHYDROCODONE UR CFM-MCNC: 1328 NG/MG CREAT
NORMORPHINE: NOT DETECTED NG/MG CREAT
OPIATE CLASS: ABNORMAL
OPIATES UR QL SCN: NORMAL NG/ML
OXYCODONE+OXYMORPHONE UR QL SCN: NEGATIVE NG/ML
PCP UR QL CFM: NOT DETECTED
PCP UR QL SCN: NORMAL NG/ML
PREGABALIN UR QL CFM: NOT DETECTED
PROPOXYPH UR QL SCN: NEGATIVE NG/ML
SPECIMEN PH ACCEPTABLE UR: 5.6 (ref 4.5–8.9)
TAPENTADOL UR QL SCN: NEGATIVE NG/ML
TRAMADOL UR QL SCN: NEGATIVE NG/ML

## 2025-07-25 DIAGNOSIS — F17.200 TOBACCO USE DISORDER: ICD-10-CM

## 2025-07-28 RX ORDER — NICOTINE 21 MG/24HR
PATCH, TRANSDERMAL 24 HOURS TRANSDERMAL
Qty: 28 PATCH | Refills: 11 | Status: SHIPPED | OUTPATIENT
Start: 2025-07-28

## 2025-07-29 DIAGNOSIS — Z88.9 HISTORY OF SEASONAL ALLERGIES: ICD-10-CM

## 2025-07-29 DIAGNOSIS — T78.40XD ALLERGY, SUBSEQUENT ENCOUNTER: ICD-10-CM

## 2025-07-29 DIAGNOSIS — J30.89 ENVIRONMENTAL AND SEASONAL ALLERGIES: ICD-10-CM

## 2025-07-29 DIAGNOSIS — R11.2 NAUSEA AND VOMITING, UNSPECIFIED VOMITING TYPE: ICD-10-CM

## 2025-07-29 DIAGNOSIS — F33.41 RECURRENT MAJOR DEPRESSIVE DISORDER, IN PARTIAL REMISSION (HCC): ICD-10-CM

## 2025-07-29 DIAGNOSIS — J44.9 CHRONIC OBSTRUCTIVE PULMONARY DISEASE, UNSPECIFIED COPD TYPE (HCC): ICD-10-CM

## 2025-07-29 DIAGNOSIS — M54.50 CHRONIC BILATERAL LOW BACK PAIN WITHOUT SCIATICA: ICD-10-CM

## 2025-07-29 DIAGNOSIS — G89.29 CHRONIC BILATERAL LOW BACK PAIN WITHOUT SCIATICA: ICD-10-CM

## 2025-07-29 RX ORDER — MIRTAZAPINE 45 MG/1
45 TABLET, FILM COATED ORAL
Qty: 90 TABLET | Refills: 1 | Status: SHIPPED | OUTPATIENT
Start: 2025-07-29

## 2025-07-29 RX ORDER — MONTELUKAST SODIUM 10 MG/1
10 TABLET ORAL
Qty: 90 TABLET | Refills: 1 | Status: SHIPPED | OUTPATIENT
Start: 2025-07-29

## 2025-07-29 RX ORDER — DICLOFENAC SODIUM 75 MG/1
75 TABLET, DELAYED RELEASE ORAL 2 TIMES DAILY
Qty: 180 TABLET | Refills: 1 | Status: SHIPPED | OUTPATIENT
Start: 2025-07-29

## 2025-07-29 RX ORDER — LORATADINE 10 MG/1
10 TABLET ORAL DAILY
Qty: 90 TABLET | Refills: 1 | Status: SHIPPED | OUTPATIENT
Start: 2025-07-29

## 2025-07-29 RX ORDER — FLUTICASONE FUROATE, UMECLIDINIUM BROMIDE AND VILANTEROL TRIFENATATE 200; 62.5; 25 UG/1; UG/1; UG/1
1 POWDER RESPIRATORY (INHALATION) DAILY
Qty: 180 BLISTER | Refills: 1 | Status: SHIPPED | OUTPATIENT
Start: 2025-07-29 | End: 2025-10-27

## 2025-07-29 RX ORDER — KETOTIFEN FUMARATE 0.35 MG/ML
1 SOLUTION/ DROPS OPHTHALMIC 2 TIMES DAILY
Qty: 5 ML | Refills: 3 | Status: SHIPPED | OUTPATIENT
Start: 2025-07-29

## 2025-07-29 RX ORDER — ONDANSETRON 4 MG/1
4 TABLET, FILM COATED ORAL EVERY 8 HOURS PRN
Qty: 20 TABLET | Refills: 0 | Status: SHIPPED | OUTPATIENT
Start: 2025-07-29

## 2025-08-11 ENCOUNTER — OFFICE VISIT (OUTPATIENT)
Dept: FAMILY MEDICINE CLINIC | Facility: CLINIC | Age: 55
End: 2025-08-11
Payer: COMMERCIAL